# Patient Record
Sex: MALE | Race: OTHER | NOT HISPANIC OR LATINO
[De-identification: names, ages, dates, MRNs, and addresses within clinical notes are randomized per-mention and may not be internally consistent; named-entity substitution may affect disease eponyms.]

---

## 2023-05-18 PROBLEM — Z00.00 ENCOUNTER FOR PREVENTIVE HEALTH EXAMINATION: Status: ACTIVE | Noted: 2023-05-18

## 2023-05-26 ENCOUNTER — NON-APPOINTMENT (OUTPATIENT)
Age: 88
End: 2023-05-26

## 2023-05-26 ENCOUNTER — APPOINTMENT (OUTPATIENT)
Dept: INTERNAL MEDICINE | Facility: CLINIC | Age: 88
End: 2023-05-26
Payer: COMMERCIAL

## 2023-05-26 ENCOUNTER — APPOINTMENT (OUTPATIENT)
Dept: OTOLARYNGOLOGY | Facility: CLINIC | Age: 88
End: 2023-05-26
Payer: COMMERCIAL

## 2023-05-26 VITALS
HEIGHT: 69 IN | HEART RATE: 56 BPM | SYSTOLIC BLOOD PRESSURE: 133 MMHG | TEMPERATURE: 98.9 F | DIASTOLIC BLOOD PRESSURE: 84 MMHG | BODY MASS INDEX: 19.99 KG/M2 | OXYGEN SATURATION: 96 % | WEIGHT: 135 LBS

## 2023-05-26 VITALS
TEMPERATURE: 98.2 F | HEART RATE: 70 BPM | SYSTOLIC BLOOD PRESSURE: 153 MMHG | HEIGHT: 69 IN | WEIGHT: 134 LBS | DIASTOLIC BLOOD PRESSURE: 68 MMHG | BODY MASS INDEX: 19.85 KG/M2 | OXYGEN SATURATION: 96 % | RESPIRATION RATE: 14 BRPM

## 2023-05-26 DIAGNOSIS — J44.9 CHRONIC OBSTRUCTIVE PULMONARY DISEASE, UNSPECIFIED: ICD-10-CM

## 2023-05-26 DIAGNOSIS — R13.10 DYSPHAGIA, UNSPECIFIED: ICD-10-CM

## 2023-05-26 DIAGNOSIS — Z78.9 OTHER SPECIFIED HEALTH STATUS: ICD-10-CM

## 2023-05-26 DIAGNOSIS — I10 ESSENTIAL (PRIMARY) HYPERTENSION: ICD-10-CM

## 2023-05-26 DIAGNOSIS — H40.9 UNSPECIFIED GLAUCOMA: ICD-10-CM

## 2023-05-26 DIAGNOSIS — M19.90 UNSPECIFIED OSTEOARTHRITIS, UNSPECIFIED SITE: ICD-10-CM

## 2023-05-26 DIAGNOSIS — J38.7 OTHER DISEASES OF LARYNX: ICD-10-CM

## 2023-05-26 DIAGNOSIS — Z01.812 ENCOUNTER FOR PREPROCEDURAL LABORATORY EXAMINATION: ICD-10-CM

## 2023-05-26 DIAGNOSIS — R49.0 DYSPHONIA: ICD-10-CM

## 2023-05-26 DIAGNOSIS — Z01.818 ENCOUNTER FOR OTHER PREPROCEDURAL EXAMINATION: ICD-10-CM

## 2023-05-26 DIAGNOSIS — H91.90 UNSPECIFIED HEARING LOSS, UNSPECIFIED EAR: ICD-10-CM

## 2023-05-26 PROCEDURE — 31579 LARYNGOSCOPY TELESCOPIC: CPT

## 2023-05-26 PROCEDURE — 93000 ELECTROCARDIOGRAM COMPLETE: CPT

## 2023-05-26 PROCEDURE — 99204 OFFICE O/P NEW MOD 45 MIN: CPT | Mod: 25

## 2023-05-26 PROCEDURE — 99205 OFFICE O/P NEW HI 60 MIN: CPT | Mod: 25

## 2023-05-26 PROCEDURE — 70371 SPEECH EVALUATION COMPLEX: CPT | Mod: 59

## 2023-05-26 PROCEDURE — 36415 COLL VENOUS BLD VENIPUNCTURE: CPT

## 2023-05-26 RX ORDER — LATANOPROST/PF 0.005 %
0.01 DROPS OPHTHALMIC (EYE)
Qty: 1 | Refills: 3 | Status: ACTIVE | COMMUNITY
Start: 2023-05-26

## 2023-05-26 RX ORDER — LISINOPRIL 10 MG/1
10 TABLET ORAL DAILY
Qty: 90 | Refills: 3 | Status: ACTIVE | COMMUNITY

## 2023-05-26 RX ORDER — ALBUTEROL SULFATE 90 UG/1
108 (90 BASE) AEROSOL, METERED RESPIRATORY (INHALATION)
Qty: 1 | Refills: 2 | Status: ACTIVE | COMMUNITY
Start: 2023-05-26

## 2023-05-26 RX ORDER — MELOXICAM 15 MG/1
15 TABLET ORAL
Qty: 14 | Refills: 0 | Status: ACTIVE | COMMUNITY

## 2023-05-26 RX ORDER — FLUTICASONE PROPIONATE 110 UG/1
110 AEROSOL, METERED RESPIRATORY (INHALATION) TWICE DAILY
Qty: 3 | Refills: 0 | Status: ACTIVE | COMMUNITY
Start: 2023-05-26

## 2023-05-26 NOTE — HISTORY OF PRESENT ILLNESS
[de-identified] : 5/26/23\par 88-year-old gentleman who presents with concern for dysphagia and throat discomfort.  He notes that he has throat issues x 4-5 months.  He notes that he had difficulty with swallowing.  Food is passing without issue.  Sometimes he needs to have liquids to help pass food as well.  +coughing.  Significant voice changes, now raspy and froggy in nature.  No breathing issues.  No fevers, night sweats or weight loss.  No hx of tobacco use.  2 glass of wine daily.\par \par Known hx of hearing loss.  No pain in the ears.  +episode of dizziness/vertigo recently.\par  physician recently    Dr. Hayes Garg is treating. Shortness of breath:  Unclear if shortness of breath is related to Atrial fib, more likely due to deconditioning and weight. Better post sleep apnea   8/2017 [facility] showed normal perfusion. Obesity  Body mass index is 47.61 kg/m². Encouraged weight loss  Discussed the mediterranean diet    Plan:  Encouraged weight loss  Compliance with sleep treatment reviewed  F/u 1 year as will continue rate control and elioquis. Meghan Cuadra. Jas RODRIGUEZ Scribe attestation: This note was scribed in the presence of Saray Fong MD by Miki Moctezuma RN  The scribes documentation has been prepared under my direction and personally reviewed by me in its entirety. I confirm that the note above accurately reflects all work, treatment, procedures, and medical decision making performed by me.

## 2023-05-26 NOTE — ASSESSMENT
[FreeTextEntry1] : 88-year-old gentleman with several month history of dysphonia and dysphagia.  CT imaging consistent with right-sided laryngeal mass.  Physical exam today also consistent with a right-sided laryngeal mass with potential involvement of the piriform sinus and possibly a right vocal fold paralysis.  Airway currently appears to be patent.\par \par I had a lengthy discussion with the patient at his daughter.  This is consistent with a laryngeal cancer.  We discussed neck steps which would include proceeding to the operating room for suspension MicroDirect laryngoscopy with biopsy, likely tracheostomy and PEG tube placement.  Risks benefits and alternatives of surgery were discussed including but not limited to pain, bleeding, infection, risk of anesthesia, risk of intubation and airway management.  \par \par We also discussed the role of pet/ct imaging and the role of our multidisciplinary tumor board.  We also briefly discussed potential treatment options depending on biopsy including but not limited to surgical intervention, chemotherapy and radiation.  \par \par –Plan for OR for direct laryngoscopy with biopsy, and PEG tube placement, Possible tracheotomy\par –Preoperative planning\par –We will coordinate with our international team to help facilitate care.\par \par \par

## 2023-05-26 NOTE — PHYSICAL EXAM
[FreeTextEntry1] :  hoarse and wet sounding voice [Midline] : trachea located in midline position [Normal] : no rashes

## 2023-05-26 NOTE — PROCEDURE
[de-identified] : -\par Procedure Note\par \par Pre-operative Diagnosis: laryngeal cancer\par Post-operative Diagnosis:  laryngeal cancer,  pooling of secretions in the piriform sinus bilaterally.\par Anesthesia: Topical - 1 % Lidocaine/Phenylephrine\par Procedure:  Flexible Laryngoscopy with Stroboscopy - CPT 64451\par  \par Procedure Details:  \par The patient was placed in the sitting position.  After decongestant and anesthesia were applied the laryngoscope was passed.  The nasal cavities, nasopharynx, oropharynx, hypopharynx, and larynx were all examined.  Vocal folds were examined during respiration and phonation.  The following findings were noted:\par \par Findings:  \par Nose: Septum is midline, turbinates are normal, nasal airways patent, mucosa normal\par Nasopharynx: Adenoids normal, no masses, eustachian tube normal\par Oropharynx: Pharyngeal walls symmetric and without lesion. Tonsils/fossae symmetric\par Hypopharynx: Hypopharynx and pyriform sinuses without lesion. No masses or asymmetry.  No pooling of secretions.\par Larynx:   evidence of a necrotic tumor emanating from the right aryepiglottic fold.  Left aryepiglottic fold and arytenoid normal.  Left vocal fold normal.  + Pooling of secretions in the piriform sinus bilaterally\par \par Strobe Exam Ratings\par 		\par TVF Appearance: glottis appears to be normal, though right side hard to see due to bulky lesion in the supraglottis\par TVF Mobility: normal movement on the left, questionable movement on the right\par Edema/hypertrophy: +\par Mucus on TVF: minimal\par Glottic Closure: adequate\par Mucosal Wave: reduced\par Amplitude of Vibration: reduced\par Phase: symmetric\par Supraglottic Hyperfunction: unknown\par Other Findings: none\par \par Condition: Stable.  Patient tolerated procedure well.\par \par Complications: None\par \par --------------------------------------------------------------------\par \par Procedure: Pharyngeal and speech evaluation, by cine or video - CPT 23504	\par Voice assessment was recorded via video recording on this date.\par \par Clarity:  Hoarse, wet, gravelly\par Range: reduced\par Pitch Control: reduced\par Projection: normal\par Tremor: none\par Cough: none\par \par Condition: Stable.  Patient tolerated procedure well.\par Complications: None\par

## 2023-05-30 ENCOUNTER — TRANSCRIPTION ENCOUNTER (OUTPATIENT)
Age: 88
End: 2023-05-30

## 2023-05-30 VITALS
OXYGEN SATURATION: 96 % | SYSTOLIC BLOOD PRESSURE: 164 MMHG | HEIGHT: 69 IN | WEIGHT: 133.16 LBS | TEMPERATURE: 98 F | RESPIRATION RATE: 16 BRPM | HEART RATE: 70 BPM | DIASTOLIC BLOOD PRESSURE: 91 MMHG

## 2023-05-30 LAB
ALBUMIN SERPL ELPH-MCNC: 4.4 G/DL
ALP BLD-CCNC: 86 U/L
ALT SERPL-CCNC: 13 U/L
ANION GAP SERPL CALC-SCNC: 15 MMOL/L
APPEARANCE: CLEAR
APTT BLD: 34.8 SEC
AST SERPL-CCNC: 18 U/L
BILIRUB SERPL-MCNC: 0.4 MG/DL
BILIRUBIN URINE: NEGATIVE
BLOOD URINE: NEGATIVE
BUN SERPL-MCNC: 23 MG/DL
CALCIUM SERPL-MCNC: 9.8 MG/DL
CHLORIDE SERPL-SCNC: 102 MMOL/L
CO2 SERPL-SCNC: 24 MMOL/L
COLOR: YELLOW
CREAT SERPL-MCNC: 1.16 MG/DL
EGFR: 61 ML/MIN/1.73M2
GLUCOSE QUALITATIVE U: NEGATIVE MG/DL
GLUCOSE SERPL-MCNC: 102 MG/DL
INR PPP: 1.05 RATIO
KETONES URINE: 15 MG/DL
LEUKOCYTE ESTERASE URINE: NEGATIVE
NITRITE URINE: NEGATIVE
PH URINE: 5.5
POTASSIUM SERPL-SCNC: 4.5 MMOL/L
PROT SERPL-MCNC: 6.6 G/DL
PROTEIN URINE: NEGATIVE MG/DL
PT BLD: 12.4 SEC
SODIUM SERPL-SCNC: 141 MMOL/L
SPECIFIC GRAVITY URINE: 1.02
UROBILINOGEN URINE: 0.2 MG/DL

## 2023-05-30 NOTE — HISTORY OF PRESENT ILLNESS
[No Pertinent Cardiac History] : no history of aortic stenosis, atrial fibrillation, coronary artery disease, recent myocardial infarction, or implantable device/pacemaker [(Patient denies any chest pain, claudication, dyspnea on exertion, orthopnea, palpitations or syncope)] : Patient denies any chest pain, claudication, dyspnea on exertion, orthopnea, palpitations or syncope [Moderate (4-6 METs)] : Moderate (4-6 METs) [FreeTextEntry1] : Direct Laryngoscopy w/ Biopsy, Traceostomy, and PEG placement [FreeTextEntry2] : 5/31/2023 [FreeTextEntry3] : Dr. Crisostomo [FreeTextEntry4] : h/o dysphagia/throat pain a/w change in voice found to have laryngeal mass on CT scan. Scheduled for biopsy and possible PEG placement.

## 2023-05-30 NOTE — ASU PATIENT PROFILE, ADULT - NSICDXPASTMEDICALHX_GEN_ALL_CORE_FT
PAST MEDICAL HISTORY:  Deep vein thrombosis (DVT) left knee, 25 yrs ago    Dysphagia     Glaucoma     [place] (hard of hearing)     HTN (hypertension)

## 2023-05-30 NOTE — ASU PREOP CHECKLIST - WAS PATIENT ON BETA BLOCKER?
7/30/21: NEW FLOATER IN POSTERIOR SEGMENT. NO EVIDENCE OF TEAR/HOLE. ? REMNANT VITREOUS CYST? AFTER PPV. RECOMMEND EVAL BY DR MCRAE TO DETERMINE IF LASER COULD BE AMENABLE TO TREAT IT, OTHERWISE CONSIDER PPV. No

## 2023-05-31 ENCOUNTER — RESULT REVIEW (OUTPATIENT)
Age: 88
End: 2023-05-31

## 2023-05-31 ENCOUNTER — APPOINTMENT (OUTPATIENT)
Dept: OTOLARYNGOLOGY | Facility: AMBULATORY SURGERY CENTER | Age: 88
End: 2023-05-31

## 2023-05-31 ENCOUNTER — INPATIENT (INPATIENT)
Facility: HOSPITAL | Age: 88
LOS: 0 days | Discharge: HOME | DRG: 155 | End: 2023-06-01
Attending: OTOLARYNGOLOGY | Admitting: OTOLARYNGOLOGY
Payer: COMMERCIAL

## 2023-05-31 ENCOUNTER — APPOINTMENT (OUTPATIENT)
Dept: SURGERY | Facility: HOSPITAL | Age: 88
End: 2023-05-31

## 2023-05-31 ENCOUNTER — TRANSCRIPTION ENCOUNTER (OUTPATIENT)
Age: 88
End: 2023-05-31

## 2023-05-31 DIAGNOSIS — Z96.652 PRESENCE OF LEFT ARTIFICIAL KNEE JOINT: Chronic | ICD-10-CM

## 2023-05-31 DIAGNOSIS — C32.9 MALIGNANT NEOPLASM OF LARYNX, UNSPECIFIED: ICD-10-CM

## 2023-05-31 PROBLEM — H91.90 UNSPECIFIED HEARING LOSS, UNSPECIFIED EAR: Chronic | Status: ACTIVE | Noted: 2023-05-30

## 2023-05-31 PROBLEM — H40.9 UNSPECIFIED GLAUCOMA: Chronic | Status: ACTIVE | Noted: 2023-05-30

## 2023-05-31 PROBLEM — R13.10 DYSPHAGIA, UNSPECIFIED: Chronic | Status: ACTIVE | Noted: 2023-05-30

## 2023-05-31 PROCEDURE — 88331 PATH CONSLTJ SURG 1 BLK 1SPC: CPT | Mod: 26

## 2023-05-31 PROCEDURE — 88305 TISSUE EXAM BY PATHOLOGIST: CPT | Mod: 26

## 2023-05-31 PROCEDURE — 88342 IMHCHEM/IMCYTCHM 1ST ANTB: CPT | Mod: 26

## 2023-05-31 PROCEDURE — 43246 EGD PLACE GASTROSTOMY TUBE: CPT

## 2023-05-31 PROCEDURE — 31536 LARYNGOSCOPY W/BX & OP SCOPE: CPT

## 2023-05-31 PROCEDURE — 43246 EGD PLACE GASTROSTOMY TUBE: CPT | Mod: AS

## 2023-05-31 PROCEDURE — 88341 IMHCHEM/IMCYTCHM EA ADD ANTB: CPT | Mod: 26

## 2023-05-31 PROCEDURE — 88342 IMHCHEM/IMCYTCHM 1ST ANTB: CPT | Mod: 26,59

## 2023-05-31 DEVICE — SURGICEL 4 X 8": Type: IMPLANTABLE DEVICE | Status: FUNCTIONAL

## 2023-05-31 DEVICE — KIT ENDO SAFTEY PEG PULL STD 20 FR ENDOVIVE: Type: IMPLANTABLE DEVICE | Status: FUNCTIONAL

## 2023-05-31 RX ORDER — ACETAMINOPHEN 500 MG
650 TABLET ORAL EVERY 6 HOURS
Refills: 0 | Status: DISCONTINUED | OUTPATIENT
Start: 2023-05-31 | End: 2023-06-01

## 2023-05-31 RX ORDER — MORPHINE SULFATE 50 MG/1
2 CAPSULE, EXTENDED RELEASE ORAL
Refills: 0 | Status: DISCONTINUED | OUTPATIENT
Start: 2023-05-31 | End: 2023-06-01

## 2023-05-31 RX ORDER — LISINOPRIL 2.5 MG/1
10 TABLET ORAL DAILY
Refills: 0 | Status: DISCONTINUED | OUTPATIENT
Start: 2023-05-31 | End: 2023-06-01

## 2023-05-31 RX ORDER — MELOXICAM 15 MG/1
1 TABLET ORAL
Refills: 0 | DISCHARGE

## 2023-05-31 RX ORDER — LATANOPROST 0.05 MG/ML
1 SOLUTION/ DROPS OPHTHALMIC; TOPICAL AT BEDTIME
Refills: 0 | Status: DISCONTINUED | OUTPATIENT
Start: 2023-05-31 | End: 2023-06-01

## 2023-05-31 RX ORDER — LISINOPRIL 2.5 MG/1
1 TABLET ORAL
Refills: 0 | DISCHARGE

## 2023-05-31 RX ORDER — LATANOPROST 0.05 MG/ML
1 SOLUTION/ DROPS OPHTHALMIC; TOPICAL
Refills: 0 | DISCHARGE

## 2023-05-31 RX ORDER — IBUPROFEN 200 MG
600 TABLET ORAL EVERY 6 HOURS
Refills: 0 | Status: DISCONTINUED | OUTPATIENT
Start: 2023-05-31 | End: 2023-05-31

## 2023-05-31 RX ORDER — IBUPROFEN 200 MG
600 TABLET ORAL EVERY 8 HOURS
Refills: 0 | Status: DISCONTINUED | OUTPATIENT
Start: 2023-05-31 | End: 2023-06-01

## 2023-05-31 RX ORDER — FLUTICASONE PROPIONATE 220 MCG
0 AEROSOL WITH ADAPTER (GRAM) INHALATION
Refills: 0 | DISCHARGE

## 2023-05-31 RX ORDER — LANOLIN ALCOHOL/MO/W.PET/CERES
3 CREAM (GRAM) TOPICAL AT BEDTIME
Refills: 0 | Status: DISCONTINUED | OUTPATIENT
Start: 2023-05-31 | End: 2023-06-01

## 2023-05-31 RX ORDER — SODIUM CHLORIDE 9 MG/ML
1000 INJECTION, SOLUTION INTRAVENOUS
Refills: 0 | Status: DISCONTINUED | OUTPATIENT
Start: 2023-05-31 | End: 2023-05-31

## 2023-05-31 RX ORDER — LISINOPRIL 2.5 MG/1
10 TABLET ORAL DAILY
Refills: 0 | Status: DISCONTINUED | OUTPATIENT
Start: 2023-05-31 | End: 2023-05-31

## 2023-05-31 RX ORDER — ALBUTEROL 90 UG/1
2 AEROSOL, METERED ORAL
Refills: 0 | DISCHARGE

## 2023-05-31 RX ADMIN — LATANOPROST 1 DROP(S): 0.05 SOLUTION/ DROPS OPHTHALMIC; TOPICAL at 22:43

## 2023-05-31 NOTE — PATIENT PROFILE ADULT - FALL HARM RISK - HARM RISK INTERVENTIONS

## 2023-05-31 NOTE — BRIEF OPERATIVE NOTE - NSICDXBRIEFPREOP_GEN_ALL_CORE_FT
PRE-OP DIAGNOSIS:  Dysphagia 31-May-2023 09:04:34  Alexandra Dubon   PRE-OP DIAGNOSIS:  Dysphagia 31-May-2023 09:04:34  Alexandra Dubon  Laryngeal mass 31-May-2023 09:32:57  Triston Cope

## 2023-05-31 NOTE — SWALLOW BEDSIDE ASSESSMENT ADULT - SWALLOW EVAL: DIAGNOSIS
Suspect a degree of pharyngeal dysphagia with likely primary impact to safety>efficiency. Clinical indicators of penetration/aspiration noted with thin liquids, reduced with right head turn/tuck and eliminated with mildly thick liquids. However, multiple swallows (3) noted across liquid consistencies>solids. No rahul clinical indicators of penetration/aspiration noted with purees and solids. Presentation consistent with newly found R supraglottic lesion s/p biopsy today. Given site of lesion and clinical presentation, recommend a modified barium swallow study to further assess swallowing physiology and obtain baseline function as part of treatment plan work up.

## 2023-05-31 NOTE — SWALLOW BEDSIDE ASSESSMENT ADULT - SWALLOW EVAL: RECOMMENDED FEEDING/EATING TECHNIQUES
alternate food with liquid/crush medication (when feasible)/maintain upright posture during/after eating for 30 mins/oral hygiene/position upright (90 degrees)

## 2023-05-31 NOTE — PROGRESS NOTE ADULT - ASSESSMENT
88M with right laryngeal mass s/p direct laryngoscopy with biopsy and percutaenous gastric tube placement on 5/31. Frozen specimen positive for carcinoma.  Physical exam today also consistent with a right-sided laryngeal mass with potential involvement of the piriform sinus and possibly a right vocal fold paralysis.     INCOMPLETE 88M with right laryngeal mass s/p direct laryngoscopy with biopsy and percutaenous gastric tube placement on 5/31. Frozen specimen positive for carcinoma.  Physical exam today also consistent with a right-sided laryngeal mass with potential involvement of the piriform sinus and possibly a right vocal fold paralysis.     OK to use tube for meds

## 2023-05-31 NOTE — BRIEF OPERATIVE NOTE - NSICDXBRIEFPOSTOP_GEN_ALL_CORE_FT
POST-OP DIAGNOSIS:  Dysphagia 31-May-2023 09:04:38  Alexandra Dubon   POST-OP DIAGNOSIS:  Dysphagia 31-May-2023 09:04:38  Alexandra Dubon  Laryngeal mass 31-May-2023 09:33:06  rTiston Cope

## 2023-05-31 NOTE — SWALLOW BEDSIDE ASSESSMENT ADULT - CONSISTENCIES ADMINISTERED
ice chips, thin liquids via cup x 4, mildly thick liquids (3oz), purees x 2, soft solids x 2, dry solids x 1

## 2023-05-31 NOTE — SWALLOW BEDSIDE ASSESSMENT ADULT - SLP GENERAL OBSERVATIONS
Pt was seen fully awake and alert, HOB fully elevated, on room air. A&Ox3, followed simple directives, and communicated wants/needs. Motor speech exam WNL (sequential and alternating DDK tasks and in conversation). Mild dysphonia noted characterized by rough and soft vocal quality. Family member at bedside. Pt was seen fully awake and alert, HOB fully elevated, on room air. A&Ox3, followed simple directives, and communicated wants/needs. Motor speech exam WNL (sequential and alternating DDK tasks and in conversation). Resonance deemed WNL. Mild dysphonia noted characterized by rough and soft vocal quality. Family member at bedside.

## 2023-05-31 NOTE — BRIEF OPERATIVE NOTE - OPERATION/FINDINGS
Right laryngeal mass involving R AE fold. Frozen specimen positive for carcinoma.
Patient prepped and draped in sterile fashion. Endoscope introduced through mouth past laryngeal mass into stomach. Pylorus and duodenum appear normal. 1:1 observed with good light visualization. Local injected just to the left of midline upper abdomen and incision made. Catheter and wire passed through stomach and esophagus out of mouth and PEG tube snared down to stomach and brought out of abdominal incision. Tube secured and dressing placed. Patient tolerated procedure well.

## 2023-05-31 NOTE — BRIEF OPERATIVE NOTE - NSICDXBRIEFPROCEDURE_GEN_ALL_CORE_FT
PROCEDURES:  Esophagoscopy with percutaneous placement of gastrostomy tube 31-May-2023 09:06:44  Alexandra Dubon   PROCEDURES:  Esophagoscopy with percutaneous placement of gastrostomy tube 31-May-2023 09:06:44  Alexandra Dubon  Direct laryngoscopy with biopsy 31-May-2023 09:32:50  Triston Cope

## 2023-05-31 NOTE — SWALLOW BEDSIDE ASSESSMENT ADULT - COMMENTS
Per discussion with Pt and family member at bedside, cough reported with and without oral intake for the last few months and reported swallowing discomfort and effort with solids, often requiring liquid wash. Denied choking. Pt reported 5-6lbs unintentional weight loss in the last month. He denied any recent hx of PNA.

## 2023-05-31 NOTE — SWALLOW BEDSIDE ASSESSMENT ADULT - ORAL PHASE
Adequate oral containment, functional mastication with solids, seemingly functional bolus manipulation, and functional oral clearance.

## 2023-05-31 NOTE — SWALLOW BEDSIDE ASSESSMENT ADULT - SLP PERTINENT HISTORY OF CURRENT PROBLEM
87 y/o M with PMHx of COPD and HTN, who was seen by Dr. Crisostomo on 4/22/23 due to concern for dysphagia, throat discomfort, and voice changes over the last 4-5 months. CT imaging consistent with right-sided laryngeal mass. Laryngoscopy completed revealed “evidence of a necrotic tumor emanating from the right aryepiglottic fold. Left aryepiglottic fold and arytenoid normal. Left vocal fold normal. + Pooling of secretions in the piriform sinus bilaterally.” Pt admitted to [facility] on 5/31/23 for planned OR, now s/p direct laryngoscopy with biopsy and PEG placement on 5/31/23 under the direction of Dr. Crisostomo. 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

## 2023-05-31 NOTE — H&P ADULT - HISTORY OF PRESENT ILLNESS
88-year-old gentleman presented to outaptient office with with concern for dysphagia and throat discomfort for 4-5 months found to have right sided laryngeal mass on flexible endoscopy. Sometimes he needs to have liquids to help pass food as well. Also endourses coughing with swallow sometimes. Significant voice changes, now raspy and froggy in nature. No breathing issues. No fevers, night sweats or weight loss. No hx of tobacco use. 2 glass of wine daily. Underwent direct laryngoscopy with biopsy and percuatneous gastric tube (PEG) placement by general surgery on 5/31. Procedure uncomplicated.    Active Problems    COPD (chronic obstructive pulmonary disease) (496) (J44.9)  Glaucoma (365.9) (H40.9)  Hard of hearing (389.9) (H91.90)  HTN (hypertension) (401.9) (I10)  Osteoarthritis (715.90) (M19.90)  Pre-op exam (V72.84) (Z01.818)  Pre-operative laboratory examination (V72.63) (Z01.812)    Surgical History    History of Cataract Surgery  History of Hernia Repair  History of Total Knee Replacement Left    Social History    Caffeine use (V49.89) (Z78.9)  Lives in [place]  Non-smoker (V49.89) (Z78.9)  Social alcohol use (V49.89) (Z78.9)    Current Meds  Fluticasone Propionate  MCG/ACT Inhalation Aerosol; INHALE 1 PUFF TWICE  DAILY  Latanoprost 0.005 % Ophthalmic Solution; INSTILL 1 DROP IN BOTH EYES AT BEDTIME  Lisinopril 10 MG Oral Tablet; TAKE 1 TABLET DAILY  Meloxicam 15 MG Oral Tablet; TAKE 1 TABLET DAILY AS NEEDED  Ventolin  (90 Base) MCG/ACT Inhalation Aerosol Solution; INHALE 1 TO 2 PUFFS  EVERY 4 TO 6 HOURS AS NEEDED    Allergies    Penicillins    Vitals  Vital Signs    Recorded: [id number] 10:20AM   Systolic 133, LUE, Sitting   Diastolic 84, LUE, Sitting   Height 5 ft 9 in   Weight 135 lb    BMI Calculated 19.94 kg/m2   BSA Calculated 1.75   Height Comment Stated   Weight Comment Stated   Temperature 98.9 F, Temporal   Heart Rate 56, R Radial   O2 Saturation 96     Physical Exam    Constitutional: normal appearance, well groomed, well nourished, and in no acute distress . hoarse and wet sounding voice.     Neck: no mass and no adenopathy . parotid gland, submandibular gland and thyroid glands are normal. temporomandibular joint is normal.     Ear: external ears are normal bilaterally and tympanic membranes are normal in both ears.     Nasal:  external appearance is normal, inferior turbinates and middle turbinates are normal and no abnormal secretions.     Oral Cavity: tongue is normal, teeth are normal, gums are normal, palatine tonsils are normal, lingual tonsils are normal, mucosa is normal and trachea located in midline position.     Head/Face: no masses and lesions seen, face is symmetric . salivary glands are normal.     Eyes: ocular motility and gaze are normal, extraocular movements are normal and no nystagmus.     Respiratory: assessment of respiratory effort is normal.     Cardiovascular: cardiovascular peripheral examination is normal.     Lymphatic: palpation of lymph nodes is normal and no neck adenopathy.     Neurological: cranial nerves 2-12 intact and orientation to person, place, and time: normal.     Skin: no rashes.      A/P:   88M with right laryngeal mass s/p direct laryngoscopy with biopsy and percutaenous gastric tube placement on 5/31. Frozen specimen positive for carcinoma.  Physical exam today also consistent with a right-sided laryngeal mass with potential involvement of the piriform sinus and possibly a right vocal fold paralysis.     - Admit to ENT  - Appreciate gen surge recs regarding G tube  - Okay for meds via Gtube today  - Home meds  - Pain control PRN  - SLP swallow consultation  - Nutrition consultation

## 2023-05-31 NOTE — SWALLOW BEDSIDE ASSESSMENT ADULT - PHARYNGEAL PHASE
Suspect delayed swallow and reduced hyolaryngeal complex movement noted via palpation. Clinical indicators of penetration/aspiration noted with thin liquids (subtle throat clearing), reduced with a right head turn/tuck and eliminated with mildly thick liquids. Multiple swallows (3+) noted with liquids consistencies>solids.

## 2023-06-01 ENCOUNTER — TRANSCRIPTION ENCOUNTER (OUTPATIENT)
Age: 88
End: 2023-06-01

## 2023-06-01 VITALS — WEIGHT: 133.16 LBS

## 2023-06-01 PROCEDURE — 74230 X-RAY XM SWLNG FUNCJ C+: CPT

## 2023-06-01 PROCEDURE — L8699: CPT

## 2023-06-01 PROCEDURE — C1889: CPT

## 2023-06-01 PROCEDURE — 92610 EVALUATE SWALLOWING FUNCTION: CPT

## 2023-06-01 PROCEDURE — 88331 PATH CONSLTJ SURG 1 BLK 1SPC: CPT

## 2023-06-01 PROCEDURE — 88305 TISSUE EXAM BY PATHOLOGIST: CPT

## 2023-06-01 PROCEDURE — 74230 X-RAY XM SWLNG FUNCJ C+: CPT | Mod: 26

## 2023-06-01 PROCEDURE — 88341 IMHCHEM/IMCYTCHM EA ADD ANTB: CPT

## 2023-06-01 PROCEDURE — 86900 BLOOD TYPING SEROLOGIC ABO: CPT

## 2023-06-01 PROCEDURE — 86850 RBC ANTIBODY SCREEN: CPT

## 2023-06-01 PROCEDURE — 86901 BLOOD TYPING SEROLOGIC RH(D): CPT

## 2023-06-01 PROCEDURE — 92611 MOTION FLUOROSCOPY/SWALLOW: CPT

## 2023-06-01 RX ADMIN — LISINOPRIL 10 MILLIGRAM(S): 2.5 TABLET ORAL at 06:42

## 2023-06-01 NOTE — DISCHARGE NOTE PROVIDER - HOSPITAL COURSE
88-year-old gentleman presented to outaptient office with with concern for dysphagia and throat discomfort for 4-5 months found to have right sided laryngeal mass on flexible endoscopy. Sometimes he needs to have liquids to help pass food as well. Also endourses coughing with swallow sometimes. Significant voice changes, now raspy and froggy in nature. No breathing issues. No fevers, night sweats or weight loss. No hx of tobacco use. 2 glass of wine daily. Underwent direct laryngoscopy with biopsy and percutaneous gastric tube (PEG) placement by general surgery on 5/31. Procedure uncomplicated. SLP evaluated following procedure and he was started on minced and moist and then regular diet.

## 2023-06-01 NOTE — DIETITIAN INITIAL EVALUATION ADULT - ORAL NUTRITION SUPPLEMENTS
Consider Ensure Max Protein ONS TID (150kcal, 30g Protein) for nutrient optimization, additional calories and protein

## 2023-06-01 NOTE — DIETITIAN INITIAL EVALUATION ADULT - ADD RECOMMEND
1. Continue with current diet order  Consider Ensure Max Protein ONS TID (150kcal, 30g Protein) for nutrient optimization, additional calories and protein *per pt preference*  **for optimal nutrition optimization, recommend 50% of pt's nutrient needs to be met via tube feeding; if bolus feedings are amenable by pt and medical team, recommend:  Jevity 1.5 formula, 3 cans (237mL per can) bolus, providing 711mL TV, 1065kcal, 45.3g protein, 540mL free water; this provides 17.6kcal/kg, .75g/kg protein, based on ABW of 60.4kg; this is to meet >50% of pt's nutrient needs, remaining needs to be met orally/via PO.  2. Encourage pt to meet nutritional needs as able  3. Monitor PO intakes, trend weights (weekly), monitor skin integrity, monitor labs (electrolytes, CMP), monitor GI fxn   4. Encourage adherence to diet education (reinforce as able)   5. MVI supplementation  6. Pain and bowel regimen per team   7. Will continue to assess/honor preferences as able   8. Align nutrition interventions with GOC at all times  1. Continue with current diet order  Consider Ensure Max Protein ONS TID (150kcal, 30g Protein) for nutrient optimization, additional calories and protein *per pt preference*  **for optimal nutrition optimization, recommend 50% of pt's nutrient needs to be met via tube feeding, remainder of nutrient needs met via PO, as tolerated by pt; if bolus feedings are amenable by pt and medical team, recommend:  Jevity 1.5 formula, 3 cans (237mL per can) bolus, providing 711mL TV, 1065kcal, 45.3g protein, 540mL free water; this provides 17.6kcal/kg, .75g/kg protein, based on ABW of 60.4kg; this is to meet >50% of pt's nutrient needs, remaining needs to be met orally/via PO.  2. Encourage pt to meet nutritional needs as able  3. Monitor PO intakes, trend weights (weekly), monitor skin integrity, monitor labs (electrolytes, CMP), monitor GI fxn   4. Encourage adherence to diet education (reinforce as able)   5. MVI supplementation  6. Pain and bowel regimen per team   7. Will continue to assess/honor preferences as able   8. Align nutrition interventions with GOC at all times

## 2023-06-01 NOTE — SWALLOW VFSS/MBS ASSESSMENT ADULT - SLP PERTINENT HISTORY OF CURRENT PROBLEM
*** PMHx of COPD and HTN, who was seen by Dr. Crisostomo on 4/22/23 due to concern for dysphagia, throat discomfort, and voice changes over the last 4-5 months. CT imaging consistent with right-sided laryngeal mass. Laryngoscopy completed revealed “evidence of a necrotic tumor emanating from the right aryepiglottic fold. Left aryepiglottic fold and arytenoid normal. Left vocal fold normal. + Pooling of secretions in the piriform sinus bilaterally.” Pt admitted to [facility] on 5/31/23 for planned OR, now s/p direct laryngoscopy with biopsy and PEG placement on 5/31/23 under the direction of Dr. Crisostomo. 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

## 2023-06-01 NOTE — DISCHARGE NOTE PROVIDER - NSDCCPTREATMENT_GEN_ALL_CORE_FT
PRINCIPAL PROCEDURE  Procedure: Direct laryngoscopy with biopsy  Findings and Treatment:       SECONDARY PROCEDURE  Procedure: Esophagoscopy with percutaneous placement of gastrostomy tube  Findings and Treatment:

## 2023-06-01 NOTE — DIETITIAN NUTRITION RISK NOTIFICATION - ADDITIONAL COMMENTS/DIETITIAN RECOMMENDATIONS
1. Continue with current diet order  Consider Ensure Max Protein ONS TID (150kcal, 30g Protein) for nutrient optimization, additional calories and protein *per pt preference*  **for optimal nutrition optimization, recommend 50% of pt's nutrient needs to be met via tube feeding, remainder of nutrient needs met via PO, as tolerated by pt; if bolus feedings are amenable by pt and medical team, recommend:  Jevity 1.5 formula, 3 cans (237mL per can) bolus, providing 711mL TV, 1065kcal, 45.3g protein, 540mL free water; this provides 17.6kcal/kg, .75g/kg protein, based on ABW of 60.4kg; this is to meet >50% of pt's nutrient needs, remaining needs to be met orally/via PO.  2. Encourage pt to meet nutritional needs as able  3. Monitor PO intakes, trend weights (weekly), monitor skin integrity, monitor labs (electrolytes, CMP), monitor GI fxn   4. Encourage adherence to diet education (reinforce as able)   5. MVI supplementation  6. Pain and bowel regimen per team   7. Will continue to assess/honor preferences as able   8. Align nutrition interventions with GOC at all times

## 2023-06-01 NOTE — SWALLOW VFSS/MBS ASSESSMENT ADULT - PHARYNGEAL PHASE COMMENTS
Initiation of the pharyngeal swallow imaged at the level of the pyriform sinuses of thin liquids. Delayed and incomplete supraglottic closure resulted in chronic penetration during the swallow with thin and mildly thick liquids and subsequent silent aspiration with thin liquids and with spillage after Initiation of the pharyngeal swallow imaged at the level of the pyriform sinuses with liquid consistencies. Reduced hyolaryngeal complex with partial epiglottic inversion and delayed and incomplete supraglottic closure (narrow) resulted in chronic penetration during the swallow with thin and mildly thick liquids and subsequent aspiration of laryngeal vestibule residue (aspiration was primarily silent, infrequent delayed cough response noted), not eliminated with a right head turn/tuck. Aspiration was >trace, though not gross. Prompted cough/throat clear partially cleared penetrated/aspirated material. Reduced BOT retraction, pharyngeal stripping, and bolus propulsion resulted in reduced cricopharyngeal opening and mild diffuse residual with thin and thickened liquid consistencies, moderate vallecular residual with pudding, severe vallecular retention with solids (imaged inconsistently). Subsequent swallows partially cleared residual. However, trace-minimal aspiration imaged of residual that spilled into the laryngeal vestibule after the swallow as PO intake continued. Initiation of the pharyngeal swallow imaged at the level of the pyriform sinuses with liquid consistencies. Reduced hyolaryngeal complex with partial epiglottic inversion and delayed and incomplete supraglottic closure (narrow) resulted in chronic penetration during the swallow with thin and mildly thick liquids and subsequent aspiration of laryngeal vestibule residue (aspiration was primarily silent, infrequent delayed cough response noted), not eliminated with a right head turn/tuck. Aspiration was >trace, though not gross. Prompted cough/throat clear partially cleared penetrated/aspirated material. Reduced BOT retraction, pharyngeal stripping, and bolus propulsion resulted in reduced cricopharyngeal opening and mild diffuse residual with thin and thickened liquid consistencies, moderate vallecular residual with pudding, and severe vallecular retention with solids (imaged inconsistently). Subsequent swallows partially cleared residual. However, trace-minimal aspiration imaged of residual that spilled into the laryngeal vestibule after the swallow as PO intake continued. Initiation of the pharyngeal swallow imaged at the level of the pyriform sinuses with liquid consistencies. Reduced hyolaryngeal complex with partial epiglottic inversion and delayed and incomplete supraglottic closure (narrow) resulted in chronic penetration during the swallow with thin and mildly thick liquids and subsequent aspiration of laryngeal vestibule residue (aspiration was primarily silent, infrequent delayed cough response noted), not eliminated with a right head turn/tuck. Aspiration was >trace, though not gross. Prompted cough/throat clear partially cleared penetrated/aspirated material. Reduced BOT retraction (wide column, most notable with solids), pharyngeal stripping, and bolus propulsion resulted in reduced cricopharyngeal opening and mild diffuse residual with thin and thickened liquid consistencies, moderate vallecular residual with pudding, and severe vallecular retention with solids (imaged inconsistently). Subsequent swallows partially cleared residual. However, trace-minimal aspiration imaged of residual that spilled into the laryngeal vestibule after the swallow as PO intake continued.    A/P b/l pharyngeal contraction imaged, R reduced>left

## 2023-06-01 NOTE — DISCHARGE NOTE PROVIDER - CARE PROVIDER_API CALL
Masha Ambrosio  Surgery  [address], [address]  [place] [address]  Phone: [phone]  Fax: [phone]  Follow Up Time:     Damion Crisostomo  Otolaryngology  [address]  [place] [address]  Phone: [phone]  Fax: [phone]  Follow Up Time:

## 2023-06-01 NOTE — DIETITIAN INITIAL EVALUATION ADULT - NSICDXPASTMEDICALHX_GEN_ALL_CORE_FT
PAST MEDICAL HISTORY:  Deep vein thrombosis (DVT) left knee, 25 yrs ago    Dysphagia     Glaucoma     [place] ([place])     HTN (hypertension)

## 2023-06-01 NOTE — DIETITIAN INITIAL EVALUATION ADULT - PERTINENT MEDS FT
MEDICATIONS  (STANDING):  latanoprost 0.005% Ophthalmic Solution 1 Drop(s) Both EYES at bedtime  lisinopril 10 milliGRAM(s) Oral daily  morphine  - Injectable 2 milliGRAM(s) IV Push every 15 minutes    MEDICATIONS  (PRN):  acetaminophen   Oral Liquid .. 650 milliGRAM(s) Enteral Tube every 6 hours PRN Mild Pain (1 - 3), Moderate Pain (4 - 6)  ibuprofen  Suspension. 600 milliGRAM(s) Enteral Tube every 8 hours PRN Severe Pain (7 - 10)  melatonin 3 milliGRAM(s) Oral at bedtime PRN Insomnia

## 2023-06-01 NOTE — DIETITIAN INITIAL EVALUATION ADULT - PERSON TAUGHT/METHOD
Pt amenable to education; RD provided education in regards to the importance of adequate macro and micronutrients, as well as hydration to support ADLs, maintain energy levels and overall functional/nutritional status. General healthful education provided. Nutrient dense foods promoted. RD provided education on importance of optimal PO intake, discussed increased nutrient needs secondary to upcoming chemotherapy/radiation. Encouraged small, frequent, nutrient dense meals. Discussed protein sources. Pt was receptive and verbalized understanding. No requests for ONS/nourishments at this time r/t pt stated he is eating well currently. Pt open to ONS/nourishments when he returns home./verbal instruction/patient instructed/son instructed

## 2023-06-01 NOTE — SWALLOW VFSS/MBS ASSESSMENT ADULT - ADDITIONAL RECOMMENDATIONS
LT: Pt will safely tolerate the least restrictive diet.  ST:   1) Pt will complete dysphagia exercises to target efficiency (jessica exercise and effortful swallow) of the swallow with min cues.  Encourage use of incentive spirometer to optimize pulmonary health and potentially improve cough strength LT: Pt will safely tolerate the least restrictive diet.  ST:   1) Pt will complete dysphagia exercises to target efficiency (jessica exercise and effortful swallow) of the swallow with min cues.  Encourage use of incentive spirometer to optimize pulmonary health and potentially improve cough strength    Education provided re: deficits, diet recommendations, aspiration precautions, and plan of care. Potential signs/symptoms of aspiration reviewed and potential pulmonary complications. Emphasis placed on continuation of oral intake as tolerated for swallow preservation. Should treatment plan include CRT, emphasis placed on seeking a speech pathologist in [facility] to allow for continued follow up throughout treatment to maximize functional outcomes given acute and long term negative effects of RT on swallow function. This service to follow up to provided handout for dysphagia exercises prior to d/c.

## 2023-06-01 NOTE — SWALLOW VFSS/MBS ASSESSMENT ADULT - CONSISTENCIES ADMINISTERED
5mL thin liquids x 1, 10mL thin liquids x 1, open cup thin liquids via cup x 2; mildly thick liquids x 5; pudding x2; solids x 2

## 2023-06-01 NOTE — SWALLOW VFSS/MBS ASSESSMENT ADULT - RECOMMENDED FEEDING/EATING TECHNIQUES
throat clear-reswallow with thin liquids/alternate food with liquid/maintain upright posture during/after eating for 30 mins/oral hygiene/position upright (90 degrees)/small sips/bites

## 2023-06-01 NOTE — DISCHARGE NOTE PROVIDER - NSDCFUADDINST_GEN_ALL_CORE_FT
Pain control:  Please continue to take tylenol as needed for pain. DO NOT exceed 4000 mg per day.     Medications:  Please continue to take all of your other medications as prescribed.     Diet: Please resume your regular diet.    Please avoid heavy lifting and strenuous activity for 2 weeks following your surgery.    Incision care:  Please monitor your incision sites for any sings of infection including redness, swelling, discharge, or increased pain. If you experience any of these please contact your doctor.  Please avoid swimming and bathing until your follow up appointment. You may shower starting 48 hours after surgery, allow soap and water to flow over the wound, do not scrub, pat dry when done.  Pain control:  Please continue to take tylenol as needed for pain. DO NOT exceed 4000 mg per day.     Medications:  Please continue to take all of your other medications as prescribed.   change gauze around g tube site as needed     Diet: Please resume your regular diet. Nutrition recs as listed below:   1. Continue with current diet order  Consider Ensure Max Protein ONS TID (150kcal, 30g Protein) for nutrient optimization, additional calories and protein *per pt preference*      **for optimal nutrition optimization, recommend 50% of pt's nutrient needs to be met via tube feeding; if bolus feedings are amenable by pt and medical team, recommend:  Jevity 1.5 formula, 3 cans (237mL per can) bolus, providing 711mL TV, 1065kcal, 45.3g protein, 540mL free water; this provides 17.6kcal/kg, .75g/kg protein, based on ABW of 60.4kg; this is to meet >50% of pt's nutrient needs, remaining needs to be met orally/via PO.  2. Encourage pt to meet nutritional needs as able  3. Monitor PO intakes, trend weights (weekly), monitor skin integrity, monitor labs (electrolytes, CMP), monitor GI fxn   4. Encourage adherence to diet education (reinforce as able)   5. MVI supplementation  6. Pain and bowel regimen per team   7. Will continue to assess/honor preferences as able   8. Align nutrition interventions with GOC at all times  patient instructed; son instructed; Pt amenable to education; RD provided education in regards to the importance of adequate macro and micronutrients, as well as hydration to support ADLs, maintain energy levels and overall functional/nutritional status. General healthful education provided. Nutrient dense foods promoted. RD provided education on importance of optimal PO intake, discussed increased nutrient needs secondary to upcoming chemotherapy/radiation. Encouraged small, frequent, nutrient dense meals. Discussed protein sources. Pt was receptive and verbalized understanding. No requests for ONS/nourishments at this time r/t pt stated he is eating well currently. Pt open to ONS/nourishments when he returns home.; verbal instruction    Please avoid heavy lifting and strenuous activity for 2 weeks following your surgery.    Incision care:  Please monitor your incision sites for any sings of infection including redness, swelling, discharge, or increased pain. If you experience any of these please contact your doctor.  Please avoid swimming and bathing until your follow up appointment. You may shower starting 48 hours after surgery, allow soap and water to flow over the wound, do not scrub, pat dry when done.

## 2023-06-01 NOTE — DISCHARGE NOTE PROVIDER - NSDCMRMEDTOKEN_GEN_ALL_CORE_FT
fluticasone 110 mcg/inh inhalation aerosol with adapter: inhaled prn  latanoprost 0.005% ophthalmic emulsion: 1 in each affected eye once a day  lisinopril 10 mg oral tablet: 1 orally once a day  meloxicam 15 mg oral tablet: 1 orally prn  Ventolin HFA 90 mcg/inh inhalation aerosol: 2 inhaled prn

## 2023-06-01 NOTE — DIETITIAN INITIAL EVALUATION ADULT - OTHER CALCULATIONS
Based on Standards of Care pt within % IBW thus actual body weight used for all calculations. Needs adjusted for advanced age, upcoming chemotherapy and radiation demands r/t cancer status and malnutrition.

## 2023-06-01 NOTE — SWALLOW VFSS/MBS ASSESSMENT ADULT - ORAL PHASE COMMENTS
Adequate oral containment, mildly prolonged mastication with solids, reduced bolus cohesion with thin liquids, and functional oral clearance. Adequate oral containment, mildly prolonged mastication with solids, reduced bolus cohesion with thin liquids with disorganized a/p transport, and minimal oral residue. Pt cleared oral residual with subsequent swallows.

## 2023-06-01 NOTE — SWALLOW VFSS/MBS ASSESSMENT ADULT - SLP GENERAL OBSERVATIONS
Pt was seen fully awake and alert, sitting fully upright in chair, on room air. Pt followed simple directives and communicated wants/needs.

## 2023-06-01 NOTE — SWALLOW VFSS/MBS ASSESSMENT ADULT - DIAGNOSTIC IMPRESSIONS
Mildly impaired oral phase. Severe pharyngeal dysphagia with impact to safety and efficiency of the swallow. Delayed and incomplete supraglottic closure resulted in chronic penetration during the swallow with thin and mildly thick liquids with subsequent primarily silent aspiration (>trace, not gross) of laryngeal vestibule residual, partially cleared most effectively with a prompted robust cough/throat clear. Reduced BOT retraction and bolus propulsion resulted in moderate and severe vallecular retention with pudding and solids, respectively and mildly diffuse with mildly thick>thin liquids. Spontaneous subsequent swallows and prompted effortful swallow partially cleared residual. Aspiration (though trace-min) imaged of residual after and during subsequent swallows as PO intake continued.     Presentation consistent with newly found R supraglottic lesion. Dysphagia unlikely to be new. No current concern for active pulmonary infection and Pt remains on room air. Improvement in swallow function dependent on treatment plan and response. Should treatment plan include CRT, anticipate decline in function associated with negative effects of RT. Pt would benefit from continued follow up by speech pathology to maximize functional outcomes. Mildly impaired oral phase. Severe pharyngeal dysphagia with impact to safety and efficiency of the swallow. Delayed and incomplete supraglottic closure resulted in chronic penetration during the swallow with thin and mildly thick liquids with subsequent primarily silent aspiration (>trace, not gross) of laryngeal vestibule residual, partially cleared most effectively with a prompted robust cough/throat clear. Reduced BOT retraction and bolus propulsion resulted in moderate and severe vallecular retention with pudding and solids, respectively and mildly diffuse with mildly thick>thin liquids. Spontaneous subsequent swallows and prompted effortful swallow partially cleared residual. Aspiration (though trace-min) imaged of residual after and during subsequent swallows as PO intake continued.     Presentation consistent with newly found R supraglottic lesion. Dysphagia unlikely to be new. No current concern for active pulmonary infection and Pt remains on room air. Improvement in swallow function dependent on treatment plan and response. Should treatment plan include CRT, anticipate decline in function associated with negative effects of RT. Pt would benefit from continued follow up by speech pathology to maximize functional outcomes.      DIGEST V2 Grade 3 (S3, E3)

## 2023-06-01 NOTE — DIETITIAN INITIAL EVALUATION ADULT - OTHER INFO
88-year-old gentleman presented to outaptient office with with concern for dysphagia and throat discomfort for 4-5 months found to have right sided laryngeal mass on flexible endoscopy. Sometimes he needs to have liquids to help pass food as well. Also endourses coughing with swallow sometimes. Significant voice changes, now raspy and froggy in nature. No breathing issues. No fevers, night sweats or weight loss. No hx of tobacco use. 2 glass of wine daily. Underwent direct laryngoscopy with biopsy and percuatneous gastric tube (PEG) placement by general surgery on 5/31. Procedure uncomplicated.    Pt seen in room for nutrition assessment. Pt reports fair appetite PTA and during hospital stay. As per diet recall PTA: pt stated he was eating "everything and anything" however he stated he was eating smaller portions, r/t his discomfort in his throat, likely meeting </=75% of needs; pt stated he was cooking for his wife, who has dementia, mostly pasta, vegetables, sausage, etc. Currently on regular diet, was advanced today per SLP recommendations, was previously on Minced and Moist textures with mildly thickened liquids, tolerating fairly, noted with ~50-75% PO intakes overall. No cultural, Adventism, or ethnic food preferences noted. NKFA. Pt and pt's son stated ~5# weight loss in the past 1-2 months (3.8%, not clinically significant but noteworthy), UBW ~140#, dosing wt: 133#, IBW: 160#, pt is 83% of IBW. Denies N/V/D/C, last BM on 5/31/23 in the morning per pt. No edema. Skin: midline abdominal surgical incision. John: 20. No issues chewing or swallowing noted. Denies pain. Labs reviewed: no current abnormal nutrition-related labs in EMR/pt chart; RD to continue to monitor trends. Nutritionally pertinent medications: no nutritionally pertinent medications/supplements at this time. Per nutrition focused physical exam, RD observed pt with moderate triceps subcutaneous fat loss and clavicle muscle wasting. Based on ASPEN guidelines, pt does meet criteria for moderate malnutrition at this time. Pt amenable to education; RD provided education in regards to the importance of adequate macro and micronutrients, as well as hydration to support ADLs, maintain energy levels and overall functional/nutritional status. General healthful education provided. Nutrient dense foods promoted. RD provided education on importance of optimal PO intake, discussed increased nutrient needs secondary to upcoming chemotherapy/radiation. Encouraged small, frequent, nutrient dense meals. Discussed protein sources. Pt was receptive and verbalized understanding. No requests for ONS/nourishments at this time r/t pt stated he is eating well currently. Pt open to ONS/nourishments when he returns home. No additional nutrition-related concerns. Will continue to follow per RD protocol. Additional nutrition recommendations below to follow.

## 2023-06-01 NOTE — PROGRESS NOTE ADULT - ASSESSMENT
Assessment and Plan:  KUSUM GROVE is a 87 y/o M with PMHx of COPD and HTN, who was seen by Dr. Crisostomo on 4/22/23 due to concern for dysphagia, throat discomfort, and voice changes over the last 4-5 months. CT imaging consistent with right-sided laryngeal mass. Laryngoscopy completed revealed “evidence of a necrotic tumor emanating from the right aryepiglottic fold. Left aryepiglottic fold and arytenoid normal. Left vocal fold normal. + Pooling of secretions in the piriform sinus bilaterally.” Pt admitted to [facility] on 5/31/23 for planned OR, now s/p direct laryngoscopy with biopsy and PEG placement on 5/31/23 under the direction of Dr. Crisostomo. 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      PLAN:  - Okay for meds via Gtube today  - Home meds  - Pain control PRN  - SLP swallow consultation- recommend MBS today   - Nutrition consultation    [facility] at [phone] with any questions/concerns.    Nica Salinas PA-C  06-01-23 @ 07:11

## 2023-06-01 NOTE — PROGRESS NOTE ADULT - SUBJECTIVE AND OBJECTIVE BOX
STATUS POST:  Esophagoscopy with percutaneous placement of gastrostomy tube    Direct laryngoscopy with biopsy        POST OPERATIVE DAY #: 0    SUBJECTIVE:   Patient seen and examined for postop check. No new complaints. No abd pain. -n-v-cp-sob.    Vital Signs Last 24 Hrs  T(C): 36.6 (31 May 2023 11:40), Max: 36.6 (30 May 2023 14:55)  T(F): 97.8 (31 May 2023 11:40), Max: 97.9 (30 May 2023 14:55)  HR: 77 (31 May 2023 11:40) (60 - 77)  BP: 155/89 (31 May 2023 11:40) (145/70 - 172/80)  BP(mean): 114 (31 May 2023 10:30) (100 - 115)  RR: 17 (31 May 2023 11:40) (13 - 17)  SpO2: 97% (31 May 2023 11:40) (96% - 100%)    Parameters below as of 31 May 2023 10:30  Patient On (Oxygen Delivery Method): nasal cannula  O2 Flow (L/min): 2      I&O's Summary    31 May 2023 07:01  -  31 May 2023 11:44  --------------------------------------------------------  IN: 1200 mL / OUT: 3 mL / NET: 1197 mL        Physical Exam:  General Appearance: Appears well, NAD  Pulmonary: Nonlabored breathing, no respiratory distress  Cardiovascular: NSR  Abdomen: Soft, nondistended, nontender. G tube in place. No discharge at insertion site. covered with abd binder.  Extremities: WWP, SCD's in place     LABS:              
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     EVENTS:   - No signficant postoeprative events    SUBJECTIVE:   - Mia abdominal pain  - Denies N/V  - No flatus/BM yet.    MEDICATIONS  (STANDING):  latanoprost 0.005% Ophthalmic Solution 1 Drop(s) Both EYES at bedtime  lisinopril 10 milliGRAM(s) Oral daily  morphine  - Injectable 2 milliGRAM(s) IV Push every 15 minutes    MEDICATIONS  (PRN):  acetaminophen   Oral Liquid .. 650 milliGRAM(s) Enteral Tube every 6 hours PRN Mild Pain (1 - 3), Moderate Pain (4 - 6)  ibuprofen  Suspension. 600 milliGRAM(s) Enteral Tube every 8 hours PRN Severe Pain (7 - 10)  melatonin 3 milliGRAM(s) Oral at bedtime PRN Insomnia      Vital Signs Last 24 Hrs  T(C): 36.4 (01 Jun 2023 05:04), Max: 37.2 (31 May 2023 20:57)  T(F): 97.5 (01 Jun 2023 05:04), Max: 98.9 (31 May 2023 20:57)  HR: 79 (01 Jun 2023 06:40) (60 - 80)  BP: 138/79 (01 Jun 2023 06:40) (125/78 - 172/80)  BP(mean): 114 (31 May 2023 10:30) (100 - 115)  RR: 17 (01 Jun 2023 05:04) (13 - 17)  SpO2: 95% (01 Jun 2023 05:04) (95% - 100%)    Parameters below as of 01 Jun 2023 05:04  Patient On (Oxygen Delivery Method): room air        Neurologic: AAOx4; moving all extremities equally.   CV: Normal rate, regular rhythm  Pulm: Breathing comfortably  Abd: Soft, non-distended; No TTP throughout.   PEG: no soiling of dressing  : No [place]  Skin: No rashes  Extremities: No edema.  Psychiatric: Interacting normally.     I&O's Detail    31 May 2023 07:01  -  01 Jun 2023 07:00  --------------------------------------------------------  IN:    Lactated Ringers: 1600 mL  Total IN: 1600 mL    OUT:    Blood Loss (mL): 3 mL    Voided (mL): 875 mL  Total OUT: 878 mL    Total NET: 722 mL          LABS:                RADIOLOGY & ADDITIONAL STUDIES:
OTOLARYNGOLOGY (ENT) PROGRESS NOTE    PATIENT: KUSUM GROVE  MRN: [id number]  : 35  [id number]  DATE OF SERVICE:  23  			          Subjective/ Interval:   : patient seen this morning, did well overnight, pain controlled, tolerating diet, plan for MBS this morning/ afternoon     ALLERGIES:  penicillin (Rash)      MEDICATIONS:  Antiinfectives:     IV fluids:    Hematologic/Anticoagulation:    Pain medications/Neuro:  acetaminophen   Oral Liquid .. 650 milliGRAM(s) Enteral Tube every 6 hours PRN  ibuprofen  Suspension. 600 milliGRAM(s) Enteral Tube every 8 hours PRN  melatonin 3 milliGRAM(s) Oral at bedtime PRN  morphine  - Injectable 2 milliGRAM(s) IV Push every 15 minutes    Endocrine Medications:     All other standing medications:   latanoprost 0.005% Ophthalmic Solution 1 Drop(s) Both EYES at bedtime  lisinopril 10 milliGRAM(s) Oral daily    All other PRN medications:    Vital Signs Last 24 Hrs  T(C): 36.4 (2023 05:04), Max: 37.2 (31 May 2023 20:57)  T(F): 97.5 (2023 05:04), Max: 98.9 (31 May 2023 20:57)  HR: 79 (2023 06:40) (60 - 80)  BP: 138/79 (2023 06:40) (125/78 - 172/80)  BP(mean): 114 (31 May 2023 10:30) (100 - 115)  RR: 17 (2023 05:04) (13 - 17)  SpO2: 95% (2023 05:04) (95% - 100%)    Parameters below as of 2023 05:04  Patient On (Oxygen Delivery Method): room air           @ 07:01  -   @ 07:00  --------------------------------------------------------  IN:    Lactated Ringers: 1600 mL  Total IN: 1600 mL    OUT:    Blood Loss (mL): 3 mL    Voided (mL): 875 mL  Total OUT: 878 mL    Total NET: 722 mL        PHYSICAL EXAM:    Constitutional: normal appearance and in no acute distress . hoarse and wet sounding voice.   Neck: no mass and no adenopathy . parotid gland, submandibular gland and thyroid glands are normal. temporomandibular joint is normal.   Ear: external ears are normal bilaterall  Nasal:  external appearance is normal, inferior turbinates and middle turbinates are normal and no abnormal secretions.   Oral Cavity: tongue is normal, teeth are normal, gums are normal, palatine tonsils are normal, lingual tonsils are normal, mucosa is normal and trachea located in midline position.   Head/Face: no masses and lesions seen, face is symmetric . salivary glands are normal.   Eyes: ocular motility and gaze are normal, extraocular movements are normal and no nystagmus.   Respiratory: assessment of respiratory effort is normal.   Cardiovascular: cardiovascular peripheral examination is normal.   Lymphatic: palpation of lymph nodes is normal and no neck adenopathy.   Neurological: cranial nerves 2-12 intact and orientation to person, place, and time: normal.

## 2023-06-01 NOTE — PROGRESS NOTE ADULT - ASSESSMENT
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     - Ok for trickle feeds at 20 CC/hr and advance q8h to goal rate while in house.   - Surgery 4C followng

## 2023-06-01 NOTE — DIETITIAN INITIAL EVALUATION ADULT - NUTRITIONGOAL OUTCOME1
Pt to consistently meet at least 75% of EEE via tolerated route that is consistent with GOC during hospital stay;

## 2023-06-01 NOTE — DISCHARGE NOTE PROVIDER - NSDCFUADDAPPT_GEN_ALL_CORE_FT
Please follow up with Dr. Ambrosio in 1-2 weeks following discharge.     Please follow up with Dr. Crisostomo as scheduled.

## 2023-06-01 NOTE — DISCHARGE NOTE NURSING/CASE MANAGEMENT/SOCIAL WORK - PATIENT PORTAL LINK FT
You can access the FollowMyHealth Patient Portal offered by [facility] by registering at the following website: [email or web address]. By joining [employer]’s FollowMyHealth portal, you will also be able to view your health information using other applications (apps) compatible with our system.

## 2023-06-01 NOTE — DISCHARGE NOTE PROVIDER - NSDCFUSCHEDAPPT_GEN_ALL_CORE_FT
Damion Crisostomo  [facility] PreAdmits  Scheduled Appointment: 06/02/2023    Asa Physician [facility]  [address]  Scheduled Appointment: 06/02/2023    Damion Crisostomo Physician [facility]  [address]  Scheduled Appointment: 06/12/2023

## 2023-06-02 ENCOUNTER — APPOINTMENT (OUTPATIENT)
Dept: NUCLEAR MEDICINE | Facility: HOSPITAL | Age: 88
End: 2023-06-02

## 2023-06-02 ENCOUNTER — OUTPATIENT (OUTPATIENT)
Dept: OUTPATIENT SERVICES | Facility: HOSPITAL | Age: 88
LOS: 1 days | End: 2023-06-02
Payer: COMMERCIAL

## 2023-06-02 DIAGNOSIS — Z96.652 PRESENCE OF LEFT ARTIFICIAL KNEE JOINT: Chronic | ICD-10-CM

## 2023-06-02 PROBLEM — I10 ESSENTIAL (PRIMARY) HYPERTENSION: Chronic | Status: ACTIVE | Noted: 2023-05-30

## 2023-06-02 LAB
GLUCOSE BLDC GLUCOMTR-MCNC: 106 MG/DL — HIGH (ref 70–99)
SURGICAL PATHOLOGY STUDY: SIGNIFICANT CHANGE UP

## 2023-06-02 PROCEDURE — 82962 GLUCOSE BLOOD TEST: CPT

## 2023-06-02 PROCEDURE — 78815 PET IMAGE W/CT SKULL-THIGH: CPT

## 2023-06-02 PROCEDURE — A9552: CPT

## 2023-06-02 PROCEDURE — 78815 PET IMAGE W/CT SKULL-THIGH: CPT | Mod: 26,PI

## 2023-06-06 DIAGNOSIS — J38.7 OTHER DISEASES OF LARYNX: ICD-10-CM

## 2023-06-06 DIAGNOSIS — J44.9 CHRONIC OBSTRUCTIVE PULMONARY DISEASE, UNSPECIFIED: ICD-10-CM

## 2023-06-06 DIAGNOSIS — I10 ESSENTIAL (PRIMARY) HYPERTENSION: ICD-10-CM

## 2023-06-06 DIAGNOSIS — Z98.49 CATARACT EXTRACTION STATUS, UNSPECIFIED EYE: ICD-10-CM

## 2023-06-06 DIAGNOSIS — Z88.0 ALLERGY STATUS TO PENICILLIN: ICD-10-CM

## 2023-06-06 DIAGNOSIS — C32.9 MALIGNANT NEOPLASM OF LARYNX, UNSPECIFIED: ICD-10-CM

## 2023-06-06 DIAGNOSIS — Z79.51 LONG TERM (CURRENT) USE OF INHALED STEROIDS: ICD-10-CM

## 2023-06-06 DIAGNOSIS — E44.0 MODERATE PROTEIN-CALORIE MALNUTRITION: ICD-10-CM

## 2023-06-12 ENCOUNTER — APPOINTMENT (OUTPATIENT)
Dept: OTOLARYNGOLOGY | Facility: CLINIC | Age: 88
End: 2023-06-12

## 2024-01-10 PROBLEM — I82.409 ACUTE EMBOLISM AND THROMBOSIS OF UNSPECIFIED DEEP VEINS OF UNSPECIFIED LOWER EXTREMITY: Chronic | Status: ACTIVE | Noted: 2023-05-30

## 2024-01-11 ENCOUNTER — INPATIENT (INPATIENT)
Facility: HOSPITAL | Age: 89
LOS: 20 days | Discharge: HOME HEALTH | DRG: 11 | End: 2024-02-01
Attending: OTOLARYNGOLOGY | Admitting: OTOLARYNGOLOGY
Payer: COMMERCIAL

## 2024-01-11 ENCOUNTER — TRANSCRIPTION ENCOUNTER (OUTPATIENT)
Age: 89
End: 2024-01-11

## 2024-01-11 VITALS
OXYGEN SATURATION: 95 % | DIASTOLIC BLOOD PRESSURE: 81 MMHG | SYSTOLIC BLOOD PRESSURE: 149 MMHG | HEART RATE: 93 BPM | TEMPERATURE: 98 F | RESPIRATION RATE: 16 BRPM

## 2024-01-11 DIAGNOSIS — Z96.652 PRESENCE OF LEFT ARTIFICIAL KNEE JOINT: Chronic | ICD-10-CM

## 2024-01-11 PROCEDURE — 99223 1ST HOSP IP/OBS HIGH 75: CPT | Mod: 25

## 2024-01-11 PROCEDURE — 31575 DIAGNOSTIC LARYNGOSCOPY: CPT

## 2024-01-11 PROCEDURE — 93010 ELECTROCARDIOGRAM REPORT: CPT

## 2024-01-11 RX ORDER — LATANOPROST 0.05 MG/ML
1 SOLUTION/ DROPS OPHTHALMIC; TOPICAL AT BEDTIME
Refills: 0 | Status: DISCONTINUED | OUTPATIENT
Start: 2024-01-11 | End: 2024-01-12

## 2024-01-11 RX ORDER — LATANOPROST 0.05 MG/ML
1 SOLUTION/ DROPS OPHTHALMIC; TOPICAL AT BEDTIME
Refills: 0 | Status: DISCONTINUED | OUTPATIENT
Start: 2024-01-11 | End: 2024-01-11

## 2024-01-11 RX ORDER — LISINOPRIL 2.5 MG/1
10 TABLET ORAL DAILY
Refills: 0 | Status: DISCONTINUED | OUTPATIENT
Start: 2024-01-11 | End: 2024-01-12

## 2024-01-11 RX ORDER — ALBUTEROL 90 UG/1
2 AEROSOL, METERED ORAL EVERY 6 HOURS
Refills: 0 | Status: DISCONTINUED | OUTPATIENT
Start: 2024-01-11 | End: 2024-01-12

## 2024-01-11 RX ORDER — ACETAMINOPHEN 500 MG
650 TABLET ORAL EVERY 6 HOURS
Refills: 0 | Status: DISCONTINUED | OUTPATIENT
Start: 2024-01-11 | End: 2024-01-12

## 2024-01-11 RX ORDER — INFLUENZA VIRUS VACCINE 15; 15; 15; 15 UG/.5ML; UG/.5ML; UG/.5ML; UG/.5ML
0.7 SUSPENSION INTRAMUSCULAR ONCE
Refills: 0 | Status: DISCONTINUED | OUTPATIENT
Start: 2024-01-11 | End: 2024-02-01

## 2024-01-11 RX ORDER — OXYCODONE HYDROCHLORIDE 5 MG/1
2.5 TABLET ORAL EVERY 6 HOURS
Refills: 0 | Status: DISCONTINUED | OUTPATIENT
Start: 2024-01-11 | End: 2024-01-12

## 2024-01-11 RX ORDER — CELECOXIB 200 MG/1
200 CAPSULE ORAL
Refills: 0 | Status: DISCONTINUED | OUTPATIENT
Start: 2024-01-11 | End: 2024-01-12

## 2024-01-11 RX ORDER — LANOLIN ALCOHOL/MO/W.PET/CERES
3 CREAM (GRAM) TOPICAL AT BEDTIME
Refills: 0 | Status: DISCONTINUED | OUTPATIENT
Start: 2024-01-11 | End: 2024-01-12

## 2024-01-11 RX ADMIN — LATANOPROST 1 DROP(S): 0.05 SOLUTION/ DROPS OPHTHALMIC; TOPICAL at 23:09

## 2024-01-11 NOTE — H&P ADULT - HISTORY OF PRESENT ILLNESS
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 Patient lives and receives care in [facility]. He reports increased cough and mucous production associated with intermittent episodes of worsening shortness of breath for the past month. This has prompted ED visits for evaluation and discharge with steroid courses, which temporarily improved his breathing difficulty, however, patient also has glaucoma and has been advised by his ophthalmologist to avoid futures courses of steroids. He has had a hoarse, breathy voice for the past six months. Son in law reports significant weight loss over past few months. He is PEG-dependent and does not have any oral intake. Non-smoker, social alcohol use.    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 Patient lives and receives care in [facility]. He reports increased cough and mucous production associated with intermittent episodes of worsening shortness of breath for the past month. This has prompted ED visits for evaluation and discharge with steroid courses, which temporarily improved his breathing difficulty, however, patient also has glaucoma and has been advised by his ophthalmologist to avoid futures courses of steroids. He has had a hoarse, breathy voice for the past six months. Son in law reports significant weight loss over past few months. He is PEG-dependent and does not have any oral intake. Non-smoker, social alcohol use.    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 Patient lives and receives care in [facility]. He reports increased cough and mucous production associated with intermittent episodes of worsening shortness of breath for the past month. This has prompted ED visits for evaluation and discharge with steroid courses, which temporarily improved his breathing difficulty, however, patient also has glaucoma and has been advised by his ophthalmologist to avoid futures courses of steroids. He has had a hoarse, breathy voice for the past six months. Son in law reports significant weight loss over past few months. He is PEG-dependent and does not have any oral intake. Non-smoker, social alcohol use.    Active Problems   COPD (chronic obstructive pulmonary disease) (496) (J44.9)  Glaucoma (365.9) (H40.9)  Hard of hearing (389.9) (H91.90)  HTN (hypertension) (401.9) (I10)  Osteoarthritis (715.90) (M19.90)  Pre-op exam (V72.84) (Z01.818)  Pre-operative laboratory examination (V72.63) (Z01.812)    Surgical History  History of Cataract Surgery  History of Hernia Repair  History of Total Knee Replacement Left    Social History  Caffeine use (V49.89) (Z78.9)  Lives in [place]  Non-smoker (V49.89) (Z78.9)  Social alcohol use (V49.89) (Z78.9)

## 2024-01-11 NOTE — H&P ADULT - ATTENDING COMMENTS
I agree with the history, exam, physical exam as well as laryngoscopy findings.  Also agree with the assessment and plan as outlined above.  I personally evaluated the patient.

## 2024-01-11 NOTE — PATIENT PROFILE ADULT - FALL HARM RISK - HARM RISK INTERVENTIONS
Assistance with ambulation/Assistance OOB with selected safe patient handling equipment/Communicate Risk of Fall with Harm to all staff/Monitor gait and stability/Reinforce activity limits and safety measures with patient and family/Sit up slowly, dangle for a short time, stand at bedside before walking/Tailored Fall Risk Interventions/Use of alarms - bed, chair and/or voice tab/Visual Cue: Yellow wristband and red socks/Bed in lowest position, wheels locked, appropriate side rails in place/Call bell, personal items and telephone in reach/Instruct patient to call for assistance before getting out of bed or chair/Non-slip footwear when patient is out of bed/[place] to call system/Physically safe environment - no spills, clutter or unnecessary equipment/Purposeful Proactive Rounding/Room/bathroom lighting operational, light cord in reach Assistance with ambulation/Assistance OOB with selected safe patient handling equipment/Communicate Risk of Fall with Harm to all staff/Monitor gait and stability/Reinforce activity limits and safety measures with patient and family/Sit up slowly, dangle for a short time, stand at bedside before walking/Tailored Fall Risk Interventions/Use of alarms - bed, chair and/or voice tab/Visual Cue: Yellow wristband and red socks/Bed in lowest position, wheels locked, appropriate side rails in place/Call bell, personal items and telephone in reach/Instruct patient to call for assistance before getting out of bed or chair/Non-slip footwear when patient is out of bed/[place] to call system/Physically safe environment - no spills, clutter or unnecessary equipment/Purposeful Proactive Rounding/Room/bathroom lighting operational, light cord in reach

## 2024-01-11 NOTE — H&P ADULT - ASSESSMENT
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 FOE: large ulcerative lesion R arytenoid/FVF obscuring view of R TVF but likely immobile, L nodular arytenoid mass, L TVF mobile. However, patient in no acute distress, non-stridulous, breathing comfortably on RA.    Plan:  - Admit to regional under ENT, Dr. 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 FOE: large ulcerative lesion R arytenoid/FVF obscuring view of R TVF but likely immobile, L nodular arytenoid mass, L TVF mobile. However, patient in no acute distress, non-stridulous, breathing comfortably on RA.    Plan:  - Admit to regional under ENT, Dr. 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 FOE: large ulcerative lesion R arytenoid/FVF obscuring view of R TVF but likely immobile, L nodular arytenoid mass, L TVF mobile. However, patient in no acute distress, non-stridulous, breathing comfortably on RA.    Plan:  - Admit to regional under ENT, Dr. Crisostomo  - Preop labs for 1/12  - Home meds  - Will obtain consent for OR 1/12  - NPO after midnight  - Added on for DLB / trach likely 1/12  - F/u CT neck and chest

## 2024-01-11 NOTE — H&P ADULT - NSHPPHYSICALEXAM_GEN_ALL_CORE
Physical Exam:  General: NAD, breathing comfortably on RA at rest, no stridor. thin  Voice: hoarse, speaks in short sentences, breathy  OU: EOMI; PERRL; no drainage or redness  AU: external ears normal  Nose: nares patent  Mouth: No oral lesions; no gross abnormalities  Neck: trachea midline  Respiratory: unlabored respirations  Cardiovascular: regular rate  Gastrointestinal: Soft, nondistended. PEG in place  Extremities: No edema, warm and well perfused  Skin: No lesions; no rash in head and neck area    Procedure: Flexible laryngoscopy    Pre-procedure diagnosis/Indication for procedure: To evaluate larynx    Description:    A flexible endoscope was used to examine the left and right nasal cavities, posterior oropharynx, hypopharynx, and larynx. The nasal valve areas were examined for abnormalities or collapse. The inferior and middle turbinates were evaluated. The middle and superior meatuses, the sphenoethmoid recesses, and the nasopharynx were examined and inspected for mucopurulence, polyps, and nasal masses. The oropharynx, tongue base/vallecula, epiglottis, aryepiglottic folds, arytenoids, vocal cords, hypopharynx were also inspected for swelling, inflammation, or dysfunction.    Nasopharynx wnl  BOT/vallecula normal  Epiglottis sharp, copious secretions in supraglottis / glottis  Large ulcerative lesion involving R arytenoid/FVF obscuring view of R TVF but likely immobile, L nodular arytenoid mass, L TVF mobile.   No active bleeding or significant obstruction noted in supraglottic area.

## 2024-01-11 NOTE — PATIENT PROFILE ADULT - ARRIVAL FROM
[facility]/Hospitals/Psychiatric Facilities [facility]/Hospitals/Psychiatric Facilities

## 2024-01-11 NOTE — PATIENT PROFILE ADULT - NS PRO AD PATIENT TYPE
son in law, Randy sweeney [phone]/Health Care Proxy (HCP) son in law, Randy sweeney [phone]/Health Care Proxy (HCP)

## 2024-01-12 ENCOUNTER — RESULT REVIEW (OUTPATIENT)
Age: 89
End: 2024-01-12

## 2024-01-12 LAB
ANION GAP SERPL CALC-SCNC: 7 MMOL/L — SIGNIFICANT CHANGE UP (ref 5–17)
ANION GAP SERPL CALC-SCNC: 7 MMOL/L — SIGNIFICANT CHANGE UP (ref 5–17)
BLD GP AB SCN SERPL QL: NEGATIVE — SIGNIFICANT CHANGE UP
BLD GP AB SCN SERPL QL: NEGATIVE — SIGNIFICANT CHANGE UP
BUN SERPL-MCNC: 24 MG/DL — HIGH (ref 7–23)
BUN SERPL-MCNC: 24 MG/DL — HIGH (ref 7–23)
CALCIUM SERPL-MCNC: 8.7 MG/DL — SIGNIFICANT CHANGE UP (ref 8.4–10.5)
CALCIUM SERPL-MCNC: 8.7 MG/DL — SIGNIFICANT CHANGE UP (ref 8.4–10.5)
CHLORIDE SERPL-SCNC: 94 MMOL/L — LOW (ref 96–108)
CHLORIDE SERPL-SCNC: 94 MMOL/L — LOW (ref 96–108)
CO2 SERPL-SCNC: 31 MMOL/L — SIGNIFICANT CHANGE UP (ref 22–31)
CO2 SERPL-SCNC: 31 MMOL/L — SIGNIFICANT CHANGE UP (ref 22–31)
CREAT SERPL-MCNC: 0.69 MG/DL — SIGNIFICANT CHANGE UP (ref 0.5–1.3)
CREAT SERPL-MCNC: 0.69 MG/DL — SIGNIFICANT CHANGE UP (ref 0.5–1.3)
EGFR: 89 ML/MIN/1.73M2 — SIGNIFICANT CHANGE UP
EGFR: 89 ML/MIN/1.73M2 — SIGNIFICANT CHANGE UP
GLUCOSE SERPL-MCNC: 136 MG/DL — HIGH (ref 70–99)
GLUCOSE SERPL-MCNC: 136 MG/DL — HIGH (ref 70–99)
HCT VFR BLD CALC: 35.7 % — LOW (ref 39–50)
HCT VFR BLD CALC: 35.7 % — LOW (ref 39–50)
HGB BLD-MCNC: 11.8 G/DL — LOW (ref 13–17)
HGB BLD-MCNC: 11.8 G/DL — LOW (ref 13–17)
MAGNESIUM SERPL-MCNC: 2 MG/DL — SIGNIFICANT CHANGE UP (ref 1.6–2.6)
MAGNESIUM SERPL-MCNC: 2 MG/DL — SIGNIFICANT CHANGE UP (ref 1.6–2.6)
MCHC RBC-ENTMCNC: 32 PG — SIGNIFICANT CHANGE UP (ref 27–34)
MCHC RBC-ENTMCNC: 32 PG — SIGNIFICANT CHANGE UP (ref 27–34)
MCHC RBC-ENTMCNC: 33.1 GM/DL — SIGNIFICANT CHANGE UP (ref 32–36)
MCHC RBC-ENTMCNC: 33.1 GM/DL — SIGNIFICANT CHANGE UP (ref 32–36)
MCV RBC AUTO: 96.7 FL — SIGNIFICANT CHANGE UP (ref 80–100)
MCV RBC AUTO: 96.7 FL — SIGNIFICANT CHANGE UP (ref 80–100)
NRBC # BLD: 0 /100 WBCS — SIGNIFICANT CHANGE UP (ref 0–0)
NRBC # BLD: 0 /100 WBCS — SIGNIFICANT CHANGE UP (ref 0–0)
PHOSPHATE SERPL-MCNC: 2.9 MG/DL — SIGNIFICANT CHANGE UP (ref 2.5–4.5)
PHOSPHATE SERPL-MCNC: 2.9 MG/DL — SIGNIFICANT CHANGE UP (ref 2.5–4.5)
PLATELET # BLD AUTO: 184 K/UL — SIGNIFICANT CHANGE UP (ref 150–400)
PLATELET # BLD AUTO: 184 K/UL — SIGNIFICANT CHANGE UP (ref 150–400)
POTASSIUM SERPL-MCNC: 4.4 MMOL/L — SIGNIFICANT CHANGE UP (ref 3.5–5.3)
POTASSIUM SERPL-MCNC: 4.4 MMOL/L — SIGNIFICANT CHANGE UP (ref 3.5–5.3)
POTASSIUM SERPL-SCNC: 4.4 MMOL/L — SIGNIFICANT CHANGE UP (ref 3.5–5.3)
POTASSIUM SERPL-SCNC: 4.4 MMOL/L — SIGNIFICANT CHANGE UP (ref 3.5–5.3)
RBC # BLD: 3.69 M/UL — LOW (ref 4.2–5.8)
RBC # BLD: 3.69 M/UL — LOW (ref 4.2–5.8)
RBC # FLD: 13 % — SIGNIFICANT CHANGE UP (ref 10.3–14.5)
RBC # FLD: 13 % — SIGNIFICANT CHANGE UP (ref 10.3–14.5)
RH IG SCN BLD-IMP: NEGATIVE — SIGNIFICANT CHANGE UP
RH IG SCN BLD-IMP: NEGATIVE — SIGNIFICANT CHANGE UP
SODIUM SERPL-SCNC: 132 MMOL/L — LOW (ref 135–145)
SODIUM SERPL-SCNC: 132 MMOL/L — LOW (ref 135–145)
WBC # BLD: 11.14 K/UL — HIGH (ref 3.8–10.5)
WBC # BLD: 11.14 K/UL — HIGH (ref 3.8–10.5)
WBC # FLD AUTO: 11.14 K/UL — HIGH (ref 3.8–10.5)
WBC # FLD AUTO: 11.14 K/UL — HIGH (ref 3.8–10.5)

## 2024-01-12 PROCEDURE — 31600 PLANNED TRACHEOSTOMY: CPT

## 2024-01-12 PROCEDURE — 88305 TISSUE EXAM BY PATHOLOGIST: CPT | Mod: 26

## 2024-01-12 PROCEDURE — 31536 LARYNGOSCOPY W/BX & OP SCOPE: CPT

## 2024-01-12 PROCEDURE — 99252 IP/OBS CONSLTJ NEW/EST SF 35: CPT | Mod: GC

## 2024-01-12 PROCEDURE — 71045 X-RAY EXAM CHEST 1 VIEW: CPT | Mod: 26

## 2024-01-12 DEVICE — TRACH CUFF BLUSELECT 7.5MM: Type: IMPLANTABLE DEVICE | Status: FUNCTIONAL

## 2024-01-12 RX ORDER — HYDROMORPHONE HYDROCHLORIDE 2 MG/ML
0.5 INJECTION INTRAMUSCULAR; INTRAVENOUS; SUBCUTANEOUS
Refills: 0 | Status: DISCONTINUED | OUTPATIENT
Start: 2024-01-12 | End: 2024-01-13

## 2024-01-12 RX ORDER — CHLORHEXIDINE GLUCONATE 213 G/1000ML
1 SOLUTION TOPICAL
Refills: 0 | Status: DISCONTINUED | OUTPATIENT
Start: 2024-01-12 | End: 2024-01-25

## 2024-01-12 RX ORDER — SODIUM CHLORIDE 9 MG/ML
1000 INJECTION, SOLUTION INTRAVENOUS
Refills: 0 | Status: DISCONTINUED | OUTPATIENT
Start: 2024-01-12 | End: 2024-01-12

## 2024-01-12 RX ORDER — ACETAMINOPHEN 500 MG
650 TABLET ORAL EVERY 6 HOURS
Refills: 0 | Status: DISCONTINUED | OUTPATIENT
Start: 2024-01-12 | End: 2024-02-01

## 2024-01-12 RX ORDER — ONDANSETRON 8 MG/1
4 TABLET, FILM COATED ORAL EVERY 6 HOURS
Refills: 0 | Status: DISCONTINUED | OUTPATIENT
Start: 2024-01-12 | End: 2024-02-01

## 2024-01-12 RX ORDER — IPRATROPIUM BROMIDE 0.2 MG/ML
500 SOLUTION, NON-ORAL INHALATION ONCE
Refills: 0 | Status: COMPLETED | OUTPATIENT
Start: 2024-01-12 | End: 2024-01-12

## 2024-01-12 RX ORDER — ALBUTEROL 90 UG/1
2 AEROSOL, METERED ORAL EVERY 6 HOURS
Refills: 0 | Status: DISCONTINUED | OUTPATIENT
Start: 2024-01-12 | End: 2024-02-01

## 2024-01-12 RX ORDER — SODIUM CHLORIDE 9 MG/ML
1000 INJECTION, SOLUTION INTRAVENOUS
Refills: 0 | Status: DISCONTINUED | OUTPATIENT
Start: 2024-01-12 | End: 2024-01-13

## 2024-01-12 RX ORDER — OXYCODONE AND ACETAMINOPHEN 5; 325 MG/1; MG/1
1 TABLET ORAL EVERY 4 HOURS
Refills: 0 | Status: DISCONTINUED | OUTPATIENT
Start: 2024-01-12 | End: 2024-01-13

## 2024-01-12 RX ADMIN — LISINOPRIL 10 MILLIGRAM(S): 2.5 TABLET ORAL at 05:12

## 2024-01-12 RX ADMIN — Medication 500 MICROGRAM(S): at 11:09

## 2024-01-12 RX ADMIN — SODIUM CHLORIDE 60 MILLILITER(S): 9 INJECTION, SOLUTION INTRAVENOUS at 10:51

## 2024-01-12 NOTE — BRIEF OPERATIVE NOTE - NSICDXBRIEFPROCEDURE_GEN_ALL_CORE_FT
PROCEDURES:  Creation, tracheostomy, open 12-Jan-2024 18:39:02  Bebo Coombs  Direct laryngoscopy with biopsy 12-Jan-2024 18:39:19  Bebo Coombs

## 2024-01-12 NOTE — PROGRESS NOTE ADULT - SUBJECTIVE AND OBJECTIVE BOX
OTOLARYNGOLOGY (ENT) PROGRESS NOTE    PATIENT: KUSUM GROVE  MRN: [id number]  : 35  [id number]  DATE OF SERVICE:  24  			         ID:KUSUM GROVE is an 88M with history of known R sided laryngeal mass (known to Dr. 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 Patient lives and receives care in [facility]. He reports increased cough and mucous production associated with intermittent episodes of worsening shortness of breath for the past month. This has prompted ED visits for evaluation and discharge with steroid courses, which temporarily improved his breathing difficulty, however, patient also has glaucoma and has been advised by his ophthalmologist to avoid futures courses of steroids. He has had a hoarse, breathy voice for the past six months. Son in law reports significant weight loss over past few months. He is PEG-dependent and does not have any oral intake. Non-smoker, social alcohol use.      Subjective/ Interval:   : AFVSS on RA ON. Patient seen and evaluated this am. Pt with inspiratory stridor and increased secretions.     ALLERGIES:  penicillin (Rash)      MEDICATIONS:  Antiinfectives:     IV fluids:    Hematologic/Anticoagulation:    Pain medications/Neuro:  acetaminophen   Oral Liquid .. 650 milliGRAM(s) Oral every 6 hours PRN  celecoxib 200 milliGRAM(s) Oral two times a day  melatonin Liquid 3 milliGRAM(s) Oral at bedtime PRN  oxyCODONE    Solution 2.5 milliGRAM(s) Oral every 6 hours PRN    Endocrine Medications:     All other standing medications:   influenza  Vaccine (HIGH DOSE) 0.7 milliLiter(s) IntraMuscular once  latanoprost 0.005% Ophthalmic Solution 1 Drop(s) Both EYES at bedtime  lisinopril 10 milliGRAM(s) Oral daily    All other PRN medications:  albuterol    90 MICROgram(s) HFA Inhaler 2 Puff(s) Inhalation every 6 hours PRN    Vital Signs Last 24 Hrs  T(C): 36.8 (2024 04:53), Max: 36.9 (2024 19:15)  T(F): 98.3 (2024 04:53), Max: 98.4 (2024 19:15)  HR: 78 (2024 04:53) (78 - 93)  BP: 131/76 (2024 04:53) (131/76 - 149/81)  BP(mean): --  RR: 17 (2024 04:53) (16 - 17)  SpO2: 96% (2024 04:53) (95% - 96%)    Parameters below as of 2024 04:53  Patient On (Oxygen Delivery Method): room air      PHYSICAL EXAM:  GEN: appears stated age  NEURO: alert & oriented x   HEENT: inspiratory stridor noted, worse when speaking. Increased coughing.  CVS: regular rate and rhythm  Pulm: normal respiratory excursions, not tachypneic  Abd: non-distended  Ext: moving all four extremities, no peripheral edema noted       LABS                       11.8   11.14 )-----------( 184      ( 2024 05:30 )             35.7    12    132<L>  |  94<L>  |  24<H>  ----------------------------<  136<H>  4.4   |  31  |  0.69    Ca    8.7      2024 05:30  Phos  2.9       Mg     2.0     12           Coagulation Studies-     Urinalysis Basic - ( 2024 05:30 )    Color: x / Appearance: x / SG: x / pH: x  Gluc: 136 mg/dL / Ketone: x  / Bili: x / Urobili: x   Blood: x / Protein: x / Nitrite: x   Leuk Esterase: x / RBC: x / WBC x   Sq Epi: x / Non Sq Epi: x / Bacteria: x      Endocrine Panel-  Calcium: 8.7 mg/dL ( @ 05:30)               OTOLARYNGOLOGY (ENT) PROGRESS NOTE    PATIENT: KUSUM GROVE  MRN: [id number]  : 35  [id number]  DATE OF SERVICE:  24  			         ID:KUSUM GROVE is an 88M with history of known R sided laryngeal mass (known to Dr. 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 Patient lives and receives care in [facility]. He reports increased cough and mucous production associated with intermittent episodes of worsening shortness of breath for the past month. This has prompted ED visits for evaluation and discharge with steroid courses, which temporarily improved his breathing difficulty, however, patient also has glaucoma and has been advised by his ophthalmologist to avoid futures courses of steroids. He has had a hoarse, breathy voice for the past six months. Son in law reports significant weight loss over past few months. He is PEG-dependent and does not have any oral intake. Non-smoker, social alcohol use.      Subjective/ Interval:   : AFVSS on RA ON. Patient seen and evaluated this am. Pt with inspiratory stridor and increased secretions.     ALLERGIES:  penicillin (Rash)      MEDICATIONS:  Antiinfectives:     IV fluids:    Hematologic/Anticoagulation:    Pain medications/Neuro:  acetaminophen   Oral Liquid .. 650 milliGRAM(s) Oral every 6 hours PRN  celecoxib 200 milliGRAM(s) Oral two times a day  melatonin Liquid 3 milliGRAM(s) Oral at bedtime PRN  oxyCODONE    Solution 2.5 milliGRAM(s) Oral every 6 hours PRN    Endocrine Medications:     All other standing medications:   influenza  Vaccine (HIGH DOSE) 0.7 milliLiter(s) IntraMuscular once  latanoprost 0.005% Ophthalmic Solution 1 Drop(s) Both EYES at bedtime  lisinopril 10 milliGRAM(s) Oral daily    All other PRN medications:  albuterol    90 MICROgram(s) HFA Inhaler 2 Puff(s) Inhalation every 6 hours PRN    Vital Signs Last 24 Hrs  T(C): 36.8 (2024 04:53), Max: 36.9 (2024 19:15)  T(F): 98.3 (2024 04:53), Max: 98.4 (2024 19:15)  HR: 78 (2024 04:53) (78 - 93)  BP: 131/76 (2024 04:53) (131/76 - 149/81)  BP(mean): --  RR: 17 (2024 04:53) (16 - 17)  SpO2: 96% (2024 04:53) (95% - 96%)    Parameters below as of 2024 04:53  Patient On (Oxygen Delivery Method): room air      PHYSICAL EXAM:  GEN: appears stated age  NEURO: alert & oriented x   HEENT: inspiratory stridor noted, worse when speaking. Increased coughing.  CVS: regular rate and rhythm  Pulm: normal respiratory excursions, not tachypneic  Abd: non-distended  Ext: moving all four extremities, no peripheral edema noted       LABS                       11.8   11.14 )-----------( 184      ( 2024 05:30 )             35.7    12    132<L>  |  94<L>  |  24<H>  ----------------------------<  136<H>  4.4   |  31  |  0.69    Ca    8.7      2024 05:30  Phos  2.9       Mg     2.0     12           Coagulation Studies-     Urinalysis Basic - ( 2024 05:30 )    Color: x / Appearance: x / SG: x / pH: x  Gluc: 136 mg/dL / Ketone: x  / Bili: x / Urobili: x   Blood: x / Protein: x / Nitrite: x   Leuk Esterase: x / RBC: x / WBC x   Sq Epi: x / Non Sq Epi: x / Bacteria: x      Endocrine Panel-  Calcium: 8.7 mg/dL ( @ 05:30)               OTOLARYNGOLOGY (ENT) PROGRESS NOTE    PATIENT: KUSUM GROVE  MRN: [id number]  : 35  [id number]  DATE OF SERVICE:  24  			         ID:KUSUM GROVE is an 88M with history of known R sided laryngeal mass (known to Dr. 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 Patient lives and receives care in [facility]. He reports increased cough and mucous production associated with intermittent episodes of worsening shortness of breath for the past month. This has prompted ED visits for evaluation and discharge with steroid courses, which temporarily improved his breathing difficulty, however, patient also has glaucoma and has been advised by his ophthalmologist to avoid futures courses of steroids. He has had a hoarse, breathy voice for the past six months. Son in law reports significant weight loss over past few months. He is PEG-dependent and does not have any oral intake. Non-smoker, social alcohol use.      Subjective/ Interval:   : AFVSS on RA ON. Patient seen and evaluated this am. Pt with inspiratory stridor and increased secretions.     ALLERGIES:  penicillin (Rash)      MEDICATIONS:  Antiinfectives:     IV fluids:    Hematologic/Anticoagulation:    Pain medications/Neuro:  acetaminophen   Oral Liquid .. 650 milliGRAM(s) Oral every 6 hours PRN  celecoxib 200 milliGRAM(s) Oral two times a day  melatonin Liquid 3 milliGRAM(s) Oral at bedtime PRN  oxyCODONE    Solution 2.5 milliGRAM(s) Oral every 6 hours PRN    Endocrine Medications:     All other standing medications:   influenza  Vaccine (HIGH DOSE) 0.7 milliLiter(s) IntraMuscular once  latanoprost 0.005% Ophthalmic Solution 1 Drop(s) Both EYES at bedtime  lisinopril 10 milliGRAM(s) Oral daily    All other PRN medications:  albuterol    90 MICROgram(s) HFA Inhaler 2 Puff(s) Inhalation every 6 hours PRN    Vital Signs Last 24 Hrs  T(C): 36.8 (2024 04:53), Max: 36.9 (2024 19:15)  T(F): 98.3 (2024 04:53), Max: 98.4 (2024 19:15)  HR: 78 (2024 04:53) (78 - 93)  BP: 131/76 (2024 04:53) (131/76 - 149/81)  BP(mean): --  RR: 17 (2024 04:53) (16 - 17)  SpO2: 96% (2024 04:53) (95% - 96%)    Parameters below as of 2024 04:53  Patient On (Oxygen Delivery Method): room air      PHYSICAL EXAM:  GEN: appears stated age  NEURO: alert & oriented x   HEENT: inspiratory stridor noted, worse when speaking. Increased coughing.  CVS: regular rate and rhythm  Pulm: normal respiratory excursions, not tachypneic  Abd: non-distended  Ext: moving all four extremities, no peripheral edema noted       LABS                       11.8   11.14 )-----------( 184      ( 2024 05:30 )             35.7    12    132<L>  |  94<L>  |  24<H>  ----------------------------<  136<H>  4.4   |  31  |  0.69    Ca    8.7      2024 05:30  Phos  2.9       Mg     2.0     12           Coagulation Studies-     Urinalysis Basic - ( 2024 05:30 )    Color: x / Appearance: x / SG: x / pH: x  Gluc: 136 mg/dL / Ketone: x  / Bili: x / Urobili: x   Blood: x / Protein: x / Nitrite: x   Leuk Esterase: x / RBC: x / WBC x   Sq Epi: x / Non Sq Epi: x / Bacteria: x      Endocrine Panel-  Calcium: 8.7 mg/dL ( @ 05:30)

## 2024-01-12 NOTE — CONSULT NOTE ADULT - SUBJECTIVE AND OBJECTIVE BOX
HPI:  88M with history of HTN, COPD, glaucoma, hard of hearing, osteoarthritis, known R sided laryngeal mass (known to Dr. 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 Patient lives and receives care in [facility]. He reports increased cough and mucous production associated with intermittent episodes of worsening shortness of breath for the past month, 6 months of progressive voice hoarseness and multiple recent rounds of steroid use. In the ED, pt was noted to have large ulcerative lesion R arytenoid/FVF obscuring view of R TVF but likely immobile, L nodular arytenoid mass, L TVF mobile on laryngocopy. Pt was admitted to ENT service and subsequently taken to the OR on 1/12 for direct laryngoscopy w/ biopsy and tracheostomy placement. Intraop course uncomplicated, tolerating TC. Transferred to the SICU post op for airway monitoring due to increased secretion burden.     1/12: Direct laryngoscopy w/ biopsy and tracheostomy placement    Active Problems   COPD (chronic obstructive pulmonary disease) (496) (J44.9)  Glaucoma (365.9) (H40.9)  Hard of hearing (389.9) (H91.90)  HTN (hypertension) (401.9) (I10)  Osteoarthritis (715.90) (M19.90)  Pre-op exam (V72.84) (Z01.818)  Pre-operative laboratory examination (V72.63) (Z01.812)    Surgical History  History of Cataract Surgery  History of Hernia Repair  History of Total Knee Replacement Left    Social History  Caffeine use (V49.89) (Z78.9)  Lives in [place]  Non-smoker (V49.89) (Z78.9)  Social alcohol use (V49.89) (Z78.9) (11 Jan 2024 19:40)    PAST MEDICAL & SURGICAL HISTORY:  [place] (hard of hearing)  HTN (hypertension)  Glaucoma  Deep vein thrombosis (DVT)  left knee, 25 yrs ago  Dysphagia    S/P total knee replacement, left      ROS: See HPI    MEDICATIONS:  ALLERGIES:    SOCIAL HISTORY:  Smoke: Never Smoker  EtOH: occasional    Vital Signs Last 24 Hrs  T(C): 36.5 (12 Jan 2024 18:22), Max: 36.8 (12 Jan 2024 04:53)  T(F): 97.7 (12 Jan 2024 18:22), Max: 98.3 (12 Jan 2024 04:53)  HR: 86 (12 Jan 2024 19:22) (76 - 90)  BP: 104/65 (12 Jan 2024 19:22) (99/60 - 131/76)  BP(mean): 80 (12 Jan 2024 19:22) (74 - 86)  RR: 15 (12 Jan 2024 19:22) (14 - 22)  SpO2: 98% (12 Jan 2024 19:22) (94% - 100%)    Parameters below as of 12 Jan 2024 19:22  Patient On (Oxygen Delivery Method): tracheostomy collar    O2 Concentration (%): 35    PHYSICAL EXAM    Gen: NAD  CV: RRR  Pulm: CTAB  Abd: Soft, NT/ND    LABS:                        11.8   11.14 )-----------( 184      ( 12 Jan 2024 05:30 )             35.7     01-12    132<L>  |  94<L>  |  24<H>  ----------------------------<  136<H>  4.4   |  31  |  0.69    Ca    8.7      12 Jan 2024 05:30  Phos  2.9     01-12  Mg     2.0     01-12        Urinalysis Basic - ( 12 Jan 2024 05:30 )    Color: x / Appearance: x / SG: x / pH: x  Gluc: 136 mg/dL / Ketone: x  / Bili: x / Urobili: x   Blood: x / Protein: x / Nitrite: x   Leuk Esterase: x / RBC: x / WBC x   Sq Epi: x / Non Sq Epi: x / Bacteria: x      RADIOLOGY & ADDITIONAL STUDIES:    ASSESSMENT AND PLAN:  88M with history of known R sided laryngeal mass (known to Dr. 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   CV: MAP > 65  Pulm: TC as tolerated, nebs q6 standing,   GI: s/p PEG, resume TF  : Strict I and O. LR @60  Endo: ISS  Heme: no active issues  DVT ppx on hold until 1/13  Lines/Drains: PIV   Dispo: SICU HPI:  88M with history of HTN, COPD, glaucoma, hard of hearing, osteoarthritis, known R sided laryngeal mass (known to Dr. 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 Patient lives and receives care in [facility]. He reports increased cough and mucous production associated with intermittent episodes of worsening shortness of breath for the past month, 6 months of progressive voice hoarseness and multiple recent rounds of steroid use. In the ED, pt was noted to have large ulcerative lesion R arytenoid/FVF obscuring view of R TVF but likely immobile, L nodular arytenoid mass, L TVF mobile on laryngocopy. Pt was admitted to ENT service and subsequently taken to the OR on 1/12 for direct laryngoscopy w/ biopsy and tracheostomy placement. Intraop course uncomplicated, tolerating TC. Transferred to the SICU post op for airway monitoring due to increased secretion burden.     1/12: Direct laryngoscopy w/ biopsy and tracheostomy placement    Active Problems   COPD (chronic obstructive pulmonary disease) (496) (J44.9)  Glaucoma (365.9) (H40.9)  Hard of hearing (389.9) (H91.90)  HTN (hypertension) (401.9) (I10)  Osteoarthritis (715.90) (M19.90)  Pre-op exam (V72.84) (Z01.818)  Pre-operative laboratory examination (V72.63) (Z01.812)    Surgical History  History of Cataract Surgery  History of Hernia Repair  History of Total Knee Replacement Left    Social History  Caffeine use (V49.89) (Z78.9)  Lives in [place]  Non-smoker (V49.89) (Z78.9)  Social alcohol use (V49.89) (Z78.9) (11 Jan 2024 19:40)    PAST MEDICAL & SURGICAL HISTORY:  [place] (hard of hearing)  HTN (hypertension)  Glaucoma  Deep vein thrombosis (DVT)  left knee, 25 yrs ago  Dysphagia    S/P total knee replacement, left      ROS: See HPI    MEDICATIONS:  ALLERGIES:    SOCIAL HISTORY:  Smoke: Never Smoker  EtOH: occasional    Vital Signs Last 24 Hrs  T(C): 36.5 (12 Jan 2024 18:22), Max: 36.8 (12 Jan 2024 04:53)  T(F): 97.7 (12 Jan 2024 18:22), Max: 98.3 (12 Jan 2024 04:53)  HR: 86 (12 Jan 2024 19:22) (76 - 90)  BP: 104/65 (12 Jan 2024 19:22) (99/60 - 131/76)  BP(mean): 80 (12 Jan 2024 19:22) (74 - 86)  RR: 15 (12 Jan 2024 19:22) (14 - 22)  SpO2: 98% (12 Jan 2024 19:22) (94% - 100%)    Parameters below as of 12 Jan 2024 19:22  Patient On (Oxygen Delivery Method): tracheostomy collar    O2 Concentration (%): 35    PHYSICAL EXAM    Gen: NAD  CV: RRR  Pulm: CTAB  Abd: Soft, NT/ND    LABS:                        11.8   11.14 )-----------( 184      ( 12 Jan 2024 05:30 )             35.7     01-12    132<L>  |  94<L>  |  24<H>  ----------------------------<  136<H>  4.4   |  31  |  0.69    Ca    8.7      12 Jan 2024 05:30  Phos  2.9     01-12  Mg     2.0     01-12        Urinalysis Basic - ( 12 Jan 2024 05:30 )    Color: x / Appearance: x / SG: x / pH: x  Gluc: 136 mg/dL / Ketone: x  / Bili: x / Urobili: x   Blood: x / Protein: x / Nitrite: x   Leuk Esterase: x / RBC: x / WBC x   Sq Epi: x / Non Sq Epi: x / Bacteria: x      RADIOLOGY & ADDITIONAL STUDIES:    ASSESSMENT AND PLAN:  88M with history of known R sided laryngeal mass (known to Dr. 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   CV: MAP > 65  Pulm: TC as tolerated, nebs q6 standing,   GI: s/p PEG, resume TF  : Strict I and O. LR @60  Endo: ISS  Heme: no active issues  DVT ppx on hold until 1/13  Lines/Drains: PIV   Dispo: SICU HPI:  88M with history of HTN, COPD, glaucoma, hard of hearing, osteoarthritis, known R sided laryngeal mass (known to Dr. 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 Patient lives and receives care in [facility]. He reports increased cough and mucous production associated with intermittent episodes of worsening shortness of breath for the past month, 6 months of progressive voice hoarseness and multiple recent rounds of steroid use. In the ED, pt was noted to have large ulcerative lesion R arytenoid/FVF obscuring view of R TVF but likely immobile, L nodular arytenoid mass, L TVF mobile on laryngocopy. Pt was admitted to ENT service and subsequently taken to the OR on 1/12 for direct laryngoscopy w/ biopsy and tracheostomy placement. Intraop course uncomplicated, tolerating TC. Transferred to the SICU post op for airway monitoring due to increased secretion burden.     1/12: Direct laryngoscopy w/ biopsy and tracheostomy placement    Active Problems   COPD (chronic obstructive pulmonary disease) (496) (J44.9)  Glaucoma (365.9) (H40.9)  Hard of hearing (389.9) (H91.90)  HTN (hypertension) (401.9) (I10)  Osteoarthritis (715.90) (M19.90)  Pre-op exam (V72.84) (Z01.818)  Pre-operative laboratory examination (V72.63) (Z01.812)    Surgical History  History of Cataract Surgery  History of Hernia Repair  History of Total Knee Replacement Left    Social History  Caffeine use (V49.89) (Z78.9)  Lives in [place]  Non-smoker (V49.89) (Z78.9)  Social alcohol use (V49.89) (Z78.9) (11 Jan 2024 19:40)    PAST MEDICAL & SURGICAL HISTORY:  [place] (hard of hearing)  HTN (hypertension)  Glaucoma  Deep vein thrombosis (DVT)  left knee, 25 yrs ago  Dysphagia    S/P total knee replacement, left      ROS: See HPI    MEDICATIONS:  ALLERGIES:    SOCIAL HISTORY:  Smoke: Never Smoker  EtOH: occasional    Vital Signs Last 24 Hrs  T(C): 36.5 (12 Jan 2024 18:22), Max: 36.8 (12 Jan 2024 04:53)  T(F): 97.7 (12 Jan 2024 18:22), Max: 98.3 (12 Jan 2024 04:53)  HR: 86 (12 Jan 2024 19:22) (76 - 90)  BP: 104/65 (12 Jan 2024 19:22) (99/60 - 131/76)  BP(mean): 80 (12 Jan 2024 19:22) (74 - 86)  RR: 15 (12 Jan 2024 19:22) (14 - 22)  SpO2: 98% (12 Jan 2024 19:22) (94% - 100%)    Parameters below as of 12 Jan 2024 19:22  Patient On (Oxygen Delivery Method): tracheostomy collar    O2 Concentration (%): 35    PHYSICAL EXAM    Gen: NAD  CV: RRR  Pulm: Trach sutured in place, CTAB no rhonchi,crackles  Abd: Soft, NT/ND, peg site c/d/i bumper @4  /Renal: No valdovinos in place  Neuro: AOx3, nonfocal  LABS:                        11.8   11.14 )-----------( 184      ( 12 Jan 2024 05:30 )             35.7     01-12    132<L>  |  94<L>  |  24<H>  ----------------------------<  136<H>  4.4   |  31  |  0.69    Ca    8.7      12 Jan 2024 05:30  Phos  2.9     01-12  Mg     2.0     01-12        Urinalysis Basic - ( 12 Jan 2024 05:30 )    Color: x / Appearance: x / SG: x / pH: x  Gluc: 136 mg/dL / Ketone: x  / Bili: x / Urobili: x   Blood: x / Protein: x / Nitrite: x   Leuk Esterase: x / RBC: x / WBC x   Sq Epi: x / Non Sq Epi: x / Bacteria: x      RADIOLOGY & ADDITIONAL STUDIES:    ASSESSMENT AND PLAN:  88M with history of known R sided laryngeal mass (known to Dr. 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   CV: MAP > 65  Pulm: TC as tolerated, nebs q6 standing,   GI: s/p PEG, resume TF  : Strict I and O. LR @60  Endo: ISS  Heme: no active issues  DVT ppx on hold until 1/13  Lines/Drains: PIV   Dispo: SICU HPI:  88M with history of HTN, COPD, glaucoma, hard of hearing, osteoarthritis, known R sided laryngeal mass (known to Dr. 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 Patient lives and receives care in [facility]. He reports increased cough and mucous production associated with intermittent episodes of worsening shortness of breath for the past month, 6 months of progressive voice hoarseness and multiple recent rounds of steroid use. In the ED, pt was noted to have large ulcerative lesion R arytenoid/FVF obscuring view of R TVF but likely immobile, L nodular arytenoid mass, L TVF mobile on laryngocopy. Pt was admitted to ENT service and subsequently taken to the OR on 1/12 for direct laryngoscopy w/ biopsy and tracheostomy placement. Intraop course uncomplicated, tolerating TC. Transferred to the SICU post op for airway monitoring due to increased secretion burden.     1/12: Direct laryngoscopy w/ biopsy and tracheostomy placement    Active Problems   COPD (chronic obstructive pulmonary disease) (496) (J44.9)  Glaucoma (365.9) (H40.9)  Hard of hearing (389.9) (H91.90)  HTN (hypertension) (401.9) (I10)  Osteoarthritis (715.90) (M19.90)  Pre-op exam (V72.84) (Z01.818)  Pre-operative laboratory examination (V72.63) (Z01.812)    Surgical History  History of Cataract Surgery  History of Hernia Repair  History of Total Knee Replacement Left    Social History  Caffeine use (V49.89) (Z78.9)  Lives in [place]  Non-smoker (V49.89) (Z78.9)  Social alcohol use (V49.89) (Z78.9) (11 Jan 2024 19:40)    PAST MEDICAL & SURGICAL HISTORY:  [place] (hard of hearing)  HTN (hypertension)  Glaucoma  Deep vein thrombosis (DVT)  left knee, 25 yrs ago  Dysphagia    S/P total knee replacement, left      ROS: See HPI    MEDICATIONS:  ALLERGIES:    SOCIAL HISTORY:  Smoke: Never Smoker  EtOH: occasional    Vital Signs Last 24 Hrs  T(C): 36.5 (12 Jan 2024 18:22), Max: 36.8 (12 Jan 2024 04:53)  T(F): 97.7 (12 Jan 2024 18:22), Max: 98.3 (12 Jan 2024 04:53)  HR: 86 (12 Jan 2024 19:22) (76 - 90)  BP: 104/65 (12 Jan 2024 19:22) (99/60 - 131/76)  BP(mean): 80 (12 Jan 2024 19:22) (74 - 86)  RR: 15 (12 Jan 2024 19:22) (14 - 22)  SpO2: 98% (12 Jan 2024 19:22) (94% - 100%)    Parameters below as of 12 Jan 2024 19:22  Patient On (Oxygen Delivery Method): tracheostomy collar    O2 Concentration (%): 35    PHYSICAL EXAM    Gen: NAD  CV: RRR  Pulm: Trach sutured in place, CTAB no rhonchi,crackles  Abd: Soft, NT/ND, peg site c/d/i bumper @4  /Renal: No valdovinos in place  Neuro: AOx3, nonfocal  LABS:                        11.8   11.14 )-----------( 184      ( 12 Jan 2024 05:30 )             35.7     01-12    132<L>  |  94<L>  |  24<H>  ----------------------------<  136<H>  4.4   |  31  |  0.69    Ca    8.7      12 Jan 2024 05:30  Phos  2.9     01-12  Mg     2.0     01-12        Urinalysis Basic - ( 12 Jan 2024 05:30 )    Color: x / Appearance: x / SG: x / pH: x  Gluc: 136 mg/dL / Ketone: x  / Bili: x / Urobili: x   Blood: x / Protein: x / Nitrite: x   Leuk Esterase: x / RBC: x / WBC x   Sq Epi: x / Non Sq Epi: x / Bacteria: x      RADIOLOGY & ADDITIONAL STUDIES:    ASSESSMENT AND PLAN:  88M with history of known R sided laryngeal mass (known to Dr. 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   CV: MAP > 65  Pulm: TC as tolerated, nebs q6 standing,   GI: s/p PEG, resume TF  : Strict I and O. LR @60  Endo: ISS  Heme: no active issues  DVT ppx on hold until 1/13  Lines/Drains: PIV   Dispo: SICU HPI:  88M with history of HTN, COPD, glaucoma, hard of hearing, osteoarthritis, known R sided laryngeal mass (known to Dr. 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 Patient lives and receives care in [facility]. He reports increased cough and mucous production associated with intermittent episodes of worsening shortness of breath for the past month, 6 months of progressive voice hoarseness and multiple recent rounds of steroid use. In the ED, pt was noted to have large ulcerative lesion R arytenoid/FVF obscuring view of R TVF but likely immobile, L nodular arytenoid mass, L TVF mobile on laryngocopy. Pt was admitted to ENT service and subsequently taken to the OR on 1/12 for direct laryngoscopy w/ biopsy and tracheostomy placement. Intraop course uncomplicated, tolerating TC. Transferred to the SICU post op for airway monitoring due to increased secretion burden.     1/12: Direct laryngoscopy w/ biopsy and tracheostomy placement    Active Problems   COPD (chronic obstructive pulmonary disease) (496) (J44.9)  Glaucoma (365.9) (H40.9)  Hard of hearing (389.9) (H91.90)  HTN (hypertension) (401.9) (I10)  Osteoarthritis (715.90) (M19.90)  Pre-op exam (V72.84) (Z01.818)  Pre-operative laboratory examination (V72.63) (Z01.812)    Surgical History  History of Cataract Surgery  History of Hernia Repair  History of Total Knee Replacement Left    Social History  Caffeine use (V49.89) (Z78.9)  Lives in [place]  Non-smoker (V49.89) (Z78.9)  Social alcohol use (V49.89) (Z78.9) (11 Jan 2024 19:40)    PAST MEDICAL & SURGICAL HISTORY:  [place] (hard of hearing)  HTN (hypertension)  Glaucoma  Deep vein thrombosis (DVT)  left knee, 25 yrs ago  Dysphagia    S/P total knee replacement, left      ROS: See HPI    MEDICATIONS:  ALLERGIES:    SOCIAL HISTORY:  Smoke: Never Smoker  EtOH: occasional    Vital Signs Last 24 Hrs  T(C): 36.5 (12 Jan 2024 18:22), Max: 36.8 (12 Jan 2024 04:53)  T(F): 97.7 (12 Jan 2024 18:22), Max: 98.3 (12 Jan 2024 04:53)  HR: 86 (12 Jan 2024 19:22) (76 - 90)  BP: 104/65 (12 Jan 2024 19:22) (99/60 - 131/76)  BP(mean): 80 (12 Jan 2024 19:22) (74 - 86)  RR: 15 (12 Jan 2024 19:22) (14 - 22)  SpO2: 98% (12 Jan 2024 19:22) (94% - 100%)    Parameters below as of 12 Jan 2024 19:22  Patient On (Oxygen Delivery Method): tracheostomy collar    O2 Concentration (%): 35    PHYSICAL EXAM    Gen: NAD  CV: RRR  Pulm: Trach sutured in place, CTAB no rhonchi,crackles  Abd: Soft, NT/ND, peg site c/d/i bumper @4  /Renal: No valdovinos in place  Neuro: AOx3, nonfocal  LABS:                        11.8   11.14 )-----------( 184      ( 12 Jan 2024 05:30 )             35.7     01-12    132<L>  |  94<L>  |  24<H>  ----------------------------<  136<H>  4.4   |  31  |  0.69    Ca    8.7      12 Jan 2024 05:30  Phos  2.9     01-12  Mg     2.0     01-12        Urinalysis Basic - ( 12 Jan 2024 05:30 )    Color: x / Appearance: x / SG: x / pH: x  Gluc: 136 mg/dL / Ketone: x  / Bili: x / Urobili: x   Blood: x / Protein: x / Nitrite: x   Leuk Esterase: x / RBC: x / WBC x   Sq Epi: x / Non Sq Epi: x / Bacteria: x      RADIOLOGY & ADDITIONAL STUDIES:    ASSESSMENT AND PLAN:  88M with history of known R sided laryngeal mass (known to Dr. 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   CV: MAP > 65  Pulm: 35% TC. nebs q6 standing,   GI: s/p PEG, resume TF  : Strict I and O. LR @60  Endo: ISS  Heme: no active issues  DVT ppx on hold until 1/13  Lines/Drains: PIV   Dispo: SICU HPI:  88M with history of HTN, COPD, glaucoma, hard of hearing, osteoarthritis, known R sided laryngeal mass (known to Dr. 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 Patient lives and receives care in [facility]. He reports increased cough and mucous production associated with intermittent episodes of worsening shortness of breath for the past month, 6 months of progressive voice hoarseness and multiple recent rounds of steroid use. In the ED, pt was noted to have large ulcerative lesion R arytenoid/FVF obscuring view of R TVF but likely immobile, L nodular arytenoid mass, L TVF mobile on laryngocopy. Pt was admitted to ENT service and subsequently taken to the OR on 1/12 for direct laryngoscopy w/ biopsy and tracheostomy placement. Intraop course uncomplicated, tolerating TC. Transferred to the SICU post op for airway monitoring due to increased secretion burden.     1/12: Direct laryngoscopy w/ biopsy and tracheostomy placement    Active Problems   COPD (chronic obstructive pulmonary disease) (496) (J44.9)  Glaucoma (365.9) (H40.9)  Hard of hearing (389.9) (H91.90)  HTN (hypertension) (401.9) (I10)  Osteoarthritis (715.90) (M19.90)  Pre-op exam (V72.84) (Z01.818)  Pre-operative laboratory examination (V72.63) (Z01.812)    Surgical History  History of Cataract Surgery  History of Hernia Repair  History of Total Knee Replacement Left    Social History  Caffeine use (V49.89) (Z78.9)  Lives in [place]  Non-smoker (V49.89) (Z78.9)  Social alcohol use (V49.89) (Z78.9) (11 Jan 2024 19:40)    PAST MEDICAL & SURGICAL HISTORY:  [place] (hard of hearing)  HTN (hypertension)  Glaucoma  Deep vein thrombosis (DVT)  left knee, 25 yrs ago  Dysphagia    S/P total knee replacement, left      ROS: See HPI    MEDICATIONS:  ALLERGIES:    SOCIAL HISTORY:  Smoke: Never Smoker  EtOH: occasional    Vital Signs Last 24 Hrs  T(C): 36.5 (12 Jan 2024 18:22), Max: 36.8 (12 Jan 2024 04:53)  T(F): 97.7 (12 Jan 2024 18:22), Max: 98.3 (12 Jan 2024 04:53)  HR: 86 (12 Jan 2024 19:22) (76 - 90)  BP: 104/65 (12 Jan 2024 19:22) (99/60 - 131/76)  BP(mean): 80 (12 Jan 2024 19:22) (74 - 86)  RR: 15 (12 Jan 2024 19:22) (14 - 22)  SpO2: 98% (12 Jan 2024 19:22) (94% - 100%)    Parameters below as of 12 Jan 2024 19:22  Patient On (Oxygen Delivery Method): tracheostomy collar    O2 Concentration (%): 35    PHYSICAL EXAM    Gen: NAD  CV: RRR  Pulm: Trach sutured in place, CTAB no rhonchi,crackles  Abd: Soft, NT/ND, peg site c/d/i bumper @4  /Renal: No valdovinos in place  Neuro: AOx3, nonfocal  LABS:                        11.8   11.14 )-----------( 184      ( 12 Jan 2024 05:30 )             35.7     01-12    132<L>  |  94<L>  |  24<H>  ----------------------------<  136<H>  4.4   |  31  |  0.69    Ca    8.7      12 Jan 2024 05:30  Phos  2.9     01-12  Mg     2.0     01-12        Urinalysis Basic - ( 12 Jan 2024 05:30 )    Color: x / Appearance: x / SG: x / pH: x  Gluc: 136 mg/dL / Ketone: x  / Bili: x / Urobili: x   Blood: x / Protein: x / Nitrite: x   Leuk Esterase: x / RBC: x / WBC x   Sq Epi: x / Non Sq Epi: x / Bacteria: x      RADIOLOGY & ADDITIONAL STUDIES:    ASSESSMENT AND PLAN:  88M with history of known R sided laryngeal mass (known to Dr. 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   CV: MAP > 65  Pulm: 35% TC. nebs q6 standing,   GI: s/p PEG, resume TF  : Strict I and O. LR @60  Endo: ISS  Heme: no active issues  DVT ppx on hold until 1/13  Lines/Drains: PIV   Dispo: SICU HPI:  88M with history of HTN, COPD, glaucoma, hard of hearing, osteoarthritis, known R sided laryngeal mass (known to Dr. Crisostomo, biopsy R piriform sinus 5/2023 pos. for SCC, now s/p RT, PEG-dependent) presenting 1/11 with worsening shortness of breath for past month and new imaging findings on CT scan from one week ago in [facility] concerning for new laryngeal mass. Patient lives and receives care in [facility]. He reports increased cough and mucous production associated with intermittent episodes of worsening shortness of breath for the past month, 6 months of progressive voice hoarseness and multiple recent rounds of steroid use. In the ED, pt was noted to have large ulcerative lesion R arytenoid/FVF obscuring view of R TVF but likely immobile, L nodular arytenoid mass, L TVF mobile on laryngocopy. Pt was admitted to ENT service and subsequently taken to the OR on 1/12 for direct laryngoscopy w/ biopsy and tracheostomy placement. Intraop course uncomplicated, tolerating TC. Transferred to the SICU post op for airway monitoring due to increased secretion burden.     1/12: Direct laryngoscopy w/ biopsy and tracheostomy placement    Active Problems   COPD (chronic obstructive pulmonary disease) (496) (J44.9)  Glaucoma (365.9) (H40.9)  Hard of hearing (389.9) (H91.90)  HTN (hypertension) (401.9) (I10)  Osteoarthritis (715.90) (M19.90)  Pre-op exam (V72.84) (Z01.818)  Pre-operative laboratory examination (V72.63) (Z01.812)    Surgical History  History of Cataract Surgery  History of Hernia Repair  History of Total Knee Replacement Left    Social History  Caffeine use (V49.89) (Z78.9)  Lives in [place]  Non-smoker (V49.89) (Z78.9)  Social alcohol use (V49.89) (Z78.9) (11 Jan 2024 19:40)    PAST MEDICAL & SURGICAL HISTORY:  [place] (hard of hearing)  HTN (hypertension)  Glaucoma  Deep vein thrombosis (DVT)  left knee, 25 yrs ago  Dysphagia    S/P total knee replacement, left      ROS: See HPI    MEDICATIONS:  ALLERGIES:    SOCIAL HISTORY:  Smoke: Never Smoker  EtOH: occasional    Vital Signs Last 24 Hrs  T(C): 36.5 (12 Jan 2024 18:22), Max: 36.8 (12 Jan 2024 04:53)  T(F): 97.7 (12 Jan 2024 18:22), Max: 98.3 (12 Jan 2024 04:53)  HR: 86 (12 Jan 2024 19:22) (76 - 90)  BP: 104/65 (12 Jan 2024 19:22) (99/60 - 131/76)  BP(mean): 80 (12 Jan 2024 19:22) (74 - 86)  RR: 15 (12 Jan 2024 19:22) (14 - 22)  SpO2: 98% (12 Jan 2024 19:22) (94% - 100%)    Parameters below as of 12 Jan 2024 19:22  Patient On (Oxygen Delivery Method): tracheostomy collar    O2 Concentration (%): 35    PHYSICAL EXAM    Gen: NAD  CV: RRR  Pulm: Trach sutured in place, CTAB no rhonchi,crackles  Abd: Soft, NT/ND, peg site c/d/i bumper @4  /Renal: No valdovinos in place  Neuro: AOx3, nonfocal  LABS:                        11.8   11.14 )-----------( 184      ( 12 Jan 2024 05:30 )             35.7     01-12    132<L>  |  94<L>  |  24<H>  ----------------------------<  136<H>  4.4   |  31  |  0.69    Ca    8.7      12 Jan 2024 05:30  Phos  2.9     01-12  Mg     2.0     01-12        Urinalysis Basic - ( 12 Jan 2024 05:30 )    Color: x / Appearance: x / SG: x / pH: x  Gluc: 136 mg/dL / Ketone: x  / Bili: x / Urobili: x   Blood: x / Protein: x / Nitrite: x   Leuk Esterase: x / RBC: x / WBC x   Sq Epi: x / Non Sq Epi: x / Bacteria: x      RADIOLOGY & ADDITIONAL STUDIES:    ASSESSMENT AND PLAN:  88M with history of known R sided laryngeal mass (known to Dr. Crisostomo, biopsy R piriform sinus 5/2023 pos. for SCC in May 23, now s/p RT, PEG-dependent) presenting from [facility] with worsening shortness of breath, increased secretion and [facility] hoarseness[facility], FOE with large ulcerative lesion R arytenoid/FVF obscuring view of R TVF but likely immobile, L nodular arytenoid mass, L TVF mobile. 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   CV: MAP > 65  Pulm: 35% TC. nebs q6 prn,   GI: s/p PEG, resume TF  : Strict I and O. LR @60  Endo: ISS  Heme: no active issues  DVT ppx on hold until 1/13  Lines/Drains: PIV   Dispo: SICU HPI:  88M with history of HTN, COPD, glaucoma, hard of hearing, osteoarthritis, known R sided laryngeal mass (known to Dr. Crisostomo, biopsy R piriform sinus 5/2023 pos. for SCC, now s/p RT, PEG-dependent) presenting 1/11 with worsening shortness of breath for past month and new imaging findings on CT scan from one week ago in [facility] concerning for new laryngeal mass. Patient lives and receives care in [facility]. He reports increased cough and mucous production associated with intermittent episodes of worsening shortness of breath for the past month, 6 months of progressive voice hoarseness and multiple recent rounds of steroid use. In the ED, pt was noted to have large ulcerative lesion R arytenoid/FVF obscuring view of R TVF but likely immobile, L nodular arytenoid mass, L TVF mobile on laryngocopy. Pt was admitted to ENT service and subsequently taken to the OR on 1/12 for direct laryngoscopy w/ biopsy and tracheostomy placement. Intraop course uncomplicated, tolerating TC. Transferred to the SICU post op for airway monitoring due to increased secretion burden.     1/12: Direct laryngoscopy w/ biopsy and tracheostomy placement    Active Problems   COPD (chronic obstructive pulmonary disease) (496) (J44.9)  Glaucoma (365.9) (H40.9)  Hard of hearing (389.9) (H91.90)  HTN (hypertension) (401.9) (I10)  Osteoarthritis (715.90) (M19.90)  Pre-op exam (V72.84) (Z01.818)  Pre-operative laboratory examination (V72.63) (Z01.812)    Surgical History  History of Cataract Surgery  History of Hernia Repair  History of Total Knee Replacement Left    Social History  Caffeine use (V49.89) (Z78.9)  Lives in [place]  Non-smoker (V49.89) (Z78.9)  Social alcohol use (V49.89) (Z78.9) (11 Jan 2024 19:40)    PAST MEDICAL & SURGICAL HISTORY:  [place] (hard of hearing)  HTN (hypertension)  Glaucoma  Deep vein thrombosis (DVT)  left knee, 25 yrs ago  Dysphagia    S/P total knee replacement, left      ROS: See HPI    MEDICATIONS:  ALLERGIES:    SOCIAL HISTORY:  Smoke: Never Smoker  EtOH: occasional    Vital Signs Last 24 Hrs  T(C): 36.5 (12 Jan 2024 18:22), Max: 36.8 (12 Jan 2024 04:53)  T(F): 97.7 (12 Jan 2024 18:22), Max: 98.3 (12 Jan 2024 04:53)  HR: 86 (12 Jan 2024 19:22) (76 - 90)  BP: 104/65 (12 Jan 2024 19:22) (99/60 - 131/76)  BP(mean): 80 (12 Jan 2024 19:22) (74 - 86)  RR: 15 (12 Jan 2024 19:22) (14 - 22)  SpO2: 98% (12 Jan 2024 19:22) (94% - 100%)    Parameters below as of 12 Jan 2024 19:22  Patient On (Oxygen Delivery Method): tracheostomy collar    O2 Concentration (%): 35    PHYSICAL EXAM    Gen: NAD  CV: RRR  Pulm: Trach sutured in place, CTAB no rhonchi,crackles  Abd: Soft, NT/ND, peg site c/d/i bumper @4  /Renal: No valdovinos in place  Neuro: AOx3, nonfocal  LABS:                        11.8   11.14 )-----------( 184      ( 12 Jan 2024 05:30 )             35.7     01-12    132<L>  |  94<L>  |  24<H>  ----------------------------<  136<H>  4.4   |  31  |  0.69    Ca    8.7      12 Jan 2024 05:30  Phos  2.9     01-12  Mg     2.0     01-12        Urinalysis Basic - ( 12 Jan 2024 05:30 )    Color: x / Appearance: x / SG: x / pH: x  Gluc: 136 mg/dL / Ketone: x  / Bili: x / Urobili: x   Blood: x / Protein: x / Nitrite: x   Leuk Esterase: x / RBC: x / WBC x   Sq Epi: x / Non Sq Epi: x / Bacteria: x      RADIOLOGY & ADDITIONAL STUDIES:    ASSESSMENT AND PLAN:  88M with history of known R sided laryngeal mass (known to Dr. Crisostomo, biopsy R piriform sinus 5/2023 pos. for SCC in May 23, now s/p RT, PEG-dependent) presenting from [facility] with worsening shortness of breath, increased secretion and [facility] ho[facility], FOE with large ulcerative lesion R arytenoid/FVF obscuring view of R TVF but likely immobile, L nodular arytenoid mass, L TVF mobile. 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   CV: MAP > 65  Pulm: 35% TC. nebs q6 prn,   GI: s/p PEG, resume TF  : Strict I and O. LR @60  Endo: ISS  Heme: no active issues  DVT ppx on hold until 1/13  Lines/Drains: PIV   Dispo: SICU

## 2024-01-12 NOTE — PROGRESS NOTE ADULT - ASSESSMENT
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 Crisostomo, biopsy R piriform sinus 5/2023 pos. for SCC, now s/p RT, PEG-dependent) presenting with worsening shortness of breath for past month and new imaging findings on CT scan from one week ago in [facility] concerning for new laryngeal mass. FOE: large ulcerative lesion R arytenoid/FVF obscuring view of R TVF but likely immobile, L nodular arytenoid mass, L TVF mobile. However, patient in no acute distress, non-stridulous, breathing comfortably on RA.    Plan:  - Preop labs for 1/12  - Home meds  - Will obtain consent for OR 1/12  - NPO  - Added on for DLB / trach likely 1/12  - Fu CT neck   88M with history of known R sided laryngeal mass (known to Dr. Crisostomo, biopsy R piriform sinus 5/2023 pos. for SCC, now s/p RT, PEG-dependent) presenting with worsening shortness of breath for past month and new imaging findings on CT scan from one week ago in [facility] concerning for new laryngeal mass. FOE: large ulcerative lesion R arytenoid/FVF obscuring view of R TVF but likely immobile, L nodular arytenoid mass, L TVF mobile. However, patient in no acute distress, non-stridulous, breathing comfortably on RA.    Plan:  - Preop labs for 1/12  - Home meds  - Will obtain consent for OR 1/12  - NPO  - Added on for DLB / trach likely 1/12  - Fu CT neck   88M with history of known R sided laryngeal mass (known to Dr. Crisostomo, biopsy R piriform sinus 5/2023 pos. for SCC, now s/p RT, PEG-dependent) presenting with worsening shortness of breath for past month and new imaging findings on CT scan from one week ago in [facility] concerning for new laryngeal mass. FOE: large ulcerative lesion R arytenoid/FVF obscuring view of R TVF but likely immobile, L nodular arytenoid mass, L TVF mobile. However, patient in no acute distress, non-stridulous, breathing comfortably on RA.    Plan:  - Preop labs for 1/12  - Home meds  - Will obtain consent for OR 1/12  - NPO  - Added on for DLB / trach likely 1/12  - Fu CT neck

## 2024-01-12 NOTE — CONSULT NOTE ADULT - NS ATTEND AMEND GEN_ALL_CORE FT
Mukund Couch  [id number]  SICU post op  Mr. Couch is an 89 y/o male with COPD, HTN and a laryngeal SCC s/p RT,  dysphagia with PEG tube now s/p tracheostomy placement. The patient was evaluated in the PACU - he is in NAD, VSS, (+)tracheostomy with some mucus discharge via tracheostomy, no bleeding, following commands.  -Laryngeal SCC s/p tracheostomy with mucus discharge requiring frequent suctioning  -s/p tracheostomy  -COPD  -malnutrition  >pain control with Tylenol  >moisturise O2 to maintain the SO2 in the low 90s  >CXR  >SCD and Lovenox when ok with surgery  Please see above for the rest of the details as d/w the house staff. Mukund Couch  [id number]  SICU post op  Mr. Couch is an 89 y/o male with COPD, HTN and a laryngeal SCC s/p RT,  dysphagia with PEG tube now s/p tracheostomy placement. The patient was evaluated in the PACU - he is in NAD, VSS, (+)tracheostomy with some mucus discharge via tracheostomy, no bleeding, following commands.  -Laryngeal SCC s/p tracheostomy with mucus discharge requiring frequent suctioning  -s/p tracheostomy  -COPD  -malnutrition  >pain control with Tylenol  >moisturise O2 to maintain the SO2 in the low 90s  >CXR  >SCD and Lovenox when ok with surgery  Please see above for the rest of the details as d/w the house staff.

## 2024-01-13 LAB
ANION GAP SERPL CALC-SCNC: 8 MMOL/L — SIGNIFICANT CHANGE UP (ref 5–17)
ANION GAP SERPL CALC-SCNC: 8 MMOL/L — SIGNIFICANT CHANGE UP (ref 5–17)
BUN SERPL-MCNC: 27 MG/DL — HIGH (ref 7–23)
BUN SERPL-MCNC: 27 MG/DL — HIGH (ref 7–23)
CALCIUM SERPL-MCNC: 8.8 MG/DL — SIGNIFICANT CHANGE UP (ref 8.4–10.5)
CALCIUM SERPL-MCNC: 8.8 MG/DL — SIGNIFICANT CHANGE UP (ref 8.4–10.5)
CHLORIDE SERPL-SCNC: 98 MMOL/L — SIGNIFICANT CHANGE UP (ref 96–108)
CHLORIDE SERPL-SCNC: 98 MMOL/L — SIGNIFICANT CHANGE UP (ref 96–108)
CO2 SERPL-SCNC: 30 MMOL/L — SIGNIFICANT CHANGE UP (ref 22–31)
CO2 SERPL-SCNC: 30 MMOL/L — SIGNIFICANT CHANGE UP (ref 22–31)
CREAT SERPL-MCNC: 0.76 MG/DL — SIGNIFICANT CHANGE UP (ref 0.5–1.3)
CREAT SERPL-MCNC: 0.76 MG/DL — SIGNIFICANT CHANGE UP (ref 0.5–1.3)
EGFR: 86 ML/MIN/1.73M2 — SIGNIFICANT CHANGE UP
EGFR: 86 ML/MIN/1.73M2 — SIGNIFICANT CHANGE UP
GLUCOSE BLDC GLUCOMTR-MCNC: 198 MG/DL — HIGH (ref 70–99)
GLUCOSE BLDC GLUCOMTR-MCNC: 198 MG/DL — HIGH (ref 70–99)
GLUCOSE BLDC GLUCOMTR-MCNC: 205 MG/DL — HIGH (ref 70–99)
GLUCOSE BLDC GLUCOMTR-MCNC: 205 MG/DL — HIGH (ref 70–99)
GLUCOSE BLDC GLUCOMTR-MCNC: 86 MG/DL — SIGNIFICANT CHANGE UP (ref 70–99)
GLUCOSE BLDC GLUCOMTR-MCNC: 86 MG/DL — SIGNIFICANT CHANGE UP (ref 70–99)
GLUCOSE BLDC GLUCOMTR-MCNC: 98 MG/DL — SIGNIFICANT CHANGE UP (ref 70–99)
GLUCOSE BLDC GLUCOMTR-MCNC: 98 MG/DL — SIGNIFICANT CHANGE UP (ref 70–99)
GLUCOSE SERPL-MCNC: 208 MG/DL — HIGH (ref 70–99)
GLUCOSE SERPL-MCNC: 208 MG/DL — HIGH (ref 70–99)
HCT VFR BLD CALC: 34.9 % — LOW (ref 39–50)
HCT VFR BLD CALC: 34.9 % — LOW (ref 39–50)
HGB BLD-MCNC: 11.3 G/DL — LOW (ref 13–17)
HGB BLD-MCNC: 11.3 G/DL — LOW (ref 13–17)
MAGNESIUM SERPL-MCNC: 2 MG/DL — SIGNIFICANT CHANGE UP (ref 1.6–2.6)
MAGNESIUM SERPL-MCNC: 2 MG/DL — SIGNIFICANT CHANGE UP (ref 1.6–2.6)
MCHC RBC-ENTMCNC: 31.6 PG — SIGNIFICANT CHANGE UP (ref 27–34)
MCHC RBC-ENTMCNC: 31.6 PG — SIGNIFICANT CHANGE UP (ref 27–34)
MCHC RBC-ENTMCNC: 32.4 GM/DL — SIGNIFICANT CHANGE UP (ref 32–36)
MCHC RBC-ENTMCNC: 32.4 GM/DL — SIGNIFICANT CHANGE UP (ref 32–36)
MCV RBC AUTO: 97.5 FL — SIGNIFICANT CHANGE UP (ref 80–100)
MCV RBC AUTO: 97.5 FL — SIGNIFICANT CHANGE UP (ref 80–100)
NRBC # BLD: 0 /100 WBCS — SIGNIFICANT CHANGE UP (ref 0–0)
NRBC # BLD: 0 /100 WBCS — SIGNIFICANT CHANGE UP (ref 0–0)
PHOSPHATE SERPL-MCNC: 3 MG/DL — SIGNIFICANT CHANGE UP (ref 2.5–4.5)
PHOSPHATE SERPL-MCNC: 3 MG/DL — SIGNIFICANT CHANGE UP (ref 2.5–4.5)
PLATELET # BLD AUTO: 212 K/UL — SIGNIFICANT CHANGE UP (ref 150–400)
PLATELET # BLD AUTO: 212 K/UL — SIGNIFICANT CHANGE UP (ref 150–400)
POTASSIUM SERPL-MCNC: 4.6 MMOL/L — SIGNIFICANT CHANGE UP (ref 3.5–5.3)
POTASSIUM SERPL-MCNC: 4.6 MMOL/L — SIGNIFICANT CHANGE UP (ref 3.5–5.3)
POTASSIUM SERPL-SCNC: 4.6 MMOL/L — SIGNIFICANT CHANGE UP (ref 3.5–5.3)
POTASSIUM SERPL-SCNC: 4.6 MMOL/L — SIGNIFICANT CHANGE UP (ref 3.5–5.3)
RBC # BLD: 3.58 M/UL — LOW (ref 4.2–5.8)
RBC # BLD: 3.58 M/UL — LOW (ref 4.2–5.8)
RBC # FLD: 13 % — SIGNIFICANT CHANGE UP (ref 10.3–14.5)
RBC # FLD: 13 % — SIGNIFICANT CHANGE UP (ref 10.3–14.5)
SODIUM SERPL-SCNC: 136 MMOL/L — SIGNIFICANT CHANGE UP (ref 135–145)
SODIUM SERPL-SCNC: 136 MMOL/L — SIGNIFICANT CHANGE UP (ref 135–145)
WBC # BLD: 7.94 K/UL — SIGNIFICANT CHANGE UP (ref 3.8–10.5)
WBC # BLD: 7.94 K/UL — SIGNIFICANT CHANGE UP (ref 3.8–10.5)
WBC # FLD AUTO: 7.94 K/UL — SIGNIFICANT CHANGE UP (ref 3.8–10.5)
WBC # FLD AUTO: 7.94 K/UL — SIGNIFICANT CHANGE UP (ref 3.8–10.5)

## 2024-01-13 PROCEDURE — 99232 SBSQ HOSP IP/OBS MODERATE 35: CPT | Mod: GC

## 2024-01-13 PROCEDURE — 70491 CT SOFT TISSUE NECK W/DYE: CPT | Mod: 26

## 2024-01-13 PROCEDURE — 71260 CT THORAX DX C+: CPT | Mod: 26

## 2024-01-13 PROCEDURE — 71045 X-RAY EXAM CHEST 1 VIEW: CPT | Mod: 26

## 2024-01-13 RX ORDER — TIOTROPIUM BROMIDE 18 UG/1
2 CAPSULE ORAL; RESPIRATORY (INHALATION) DAILY
Refills: 0 | Status: DISCONTINUED | OUTPATIENT
Start: 2024-01-13 | End: 2024-01-13

## 2024-01-13 RX ORDER — OXYCODONE HYDROCHLORIDE 5 MG/1
2.5 TABLET ORAL EVERY 6 HOURS
Refills: 0 | Status: DISCONTINUED | OUTPATIENT
Start: 2024-01-13 | End: 2024-01-13

## 2024-01-13 RX ORDER — IPRATROPIUM/ALBUTEROL SULFATE 18-103MCG
3 AEROSOL WITH ADAPTER (GRAM) INHALATION ONCE
Refills: 0 | Status: COMPLETED | OUTPATIENT
Start: 2024-01-13 | End: 2024-01-13

## 2024-01-13 RX ORDER — GLUCAGON INJECTION, SOLUTION 0.5 MG/.1ML
1 INJECTION, SOLUTION SUBCUTANEOUS ONCE
Refills: 0 | Status: DISCONTINUED | OUTPATIENT
Start: 2024-01-13 | End: 2024-02-01

## 2024-01-13 RX ORDER — OXYCODONE HYDROCHLORIDE 5 MG/1
5 TABLET ORAL EVERY 6 HOURS
Refills: 0 | Status: DISCONTINUED | OUTPATIENT
Start: 2024-01-13 | End: 2024-01-13

## 2024-01-13 RX ORDER — INSULIN LISPRO 100/ML
1 VIAL (ML) SUBCUTANEOUS ONCE
Refills: 0 | Status: COMPLETED | OUTPATIENT
Start: 2024-01-13 | End: 2024-01-13

## 2024-01-13 RX ORDER — IPRATROPIUM BROMIDE 0.2 MG/ML
500 SOLUTION, NON-ORAL INHALATION ONCE
Refills: 0 | Status: COMPLETED | OUTPATIENT
Start: 2024-01-13 | End: 2024-01-13

## 2024-01-13 RX ORDER — DEXTROSE 50 % IN WATER 50 %
15 SYRINGE (ML) INTRAVENOUS ONCE
Refills: 0 | Status: DISCONTINUED | OUTPATIENT
Start: 2024-01-13 | End: 2024-02-01

## 2024-01-13 RX ORDER — INSULIN LISPRO 100/ML
VIAL (ML) SUBCUTANEOUS EVERY 6 HOURS
Refills: 0 | Status: DISCONTINUED | OUTPATIENT
Start: 2024-01-13 | End: 2024-02-01

## 2024-01-13 RX ORDER — DOXAZOSIN MESYLATE 4 MG
1 TABLET ORAL AT BEDTIME
Refills: 0 | Status: DISCONTINUED | OUTPATIENT
Start: 2024-01-13 | End: 2024-01-18

## 2024-01-13 RX ORDER — ENOXAPARIN SODIUM 100 MG/ML
40 INJECTION SUBCUTANEOUS EVERY 24 HOURS
Refills: 0 | Status: DISCONTINUED | OUTPATIENT
Start: 2024-01-13 | End: 2024-02-01

## 2024-01-13 RX ORDER — DEXTROSE 50 % IN WATER 50 %
25 SYRINGE (ML) INTRAVENOUS ONCE
Refills: 0 | Status: DISCONTINUED | OUTPATIENT
Start: 2024-01-13 | End: 2024-02-01

## 2024-01-13 RX ORDER — POLYETHYLENE GLYCOL 3350 17 G/17G
17 POWDER, FOR SOLUTION ORAL EVERY 24 HOURS
Refills: 0 | Status: DISCONTINUED | OUTPATIENT
Start: 2024-01-13 | End: 2024-01-20

## 2024-01-13 RX ORDER — DEXTROSE 50 % IN WATER 50 %
12.5 SYRINGE (ML) INTRAVENOUS ONCE
Refills: 0 | Status: DISCONTINUED | OUTPATIENT
Start: 2024-01-13 | End: 2024-02-01

## 2024-01-13 RX ORDER — SODIUM CHLORIDE 9 MG/ML
1000 INJECTION, SOLUTION INTRAVENOUS
Refills: 0 | Status: DISCONTINUED | OUTPATIENT
Start: 2024-01-13 | End: 2024-02-01

## 2024-01-13 RX ORDER — SODIUM CHLORIDE 9 MG/ML
500 INJECTION, SOLUTION INTRAVENOUS ONCE
Refills: 0 | Status: COMPLETED | OUTPATIENT
Start: 2024-01-13 | End: 2024-01-13

## 2024-01-13 RX ORDER — DOXAZOSIN MESYLATE 4 MG
1 TABLET ORAL ONCE
Refills: 0 | Status: COMPLETED | OUTPATIENT
Start: 2024-01-13 | End: 2024-01-13

## 2024-01-13 RX ADMIN — Medication 2: at 06:16

## 2024-01-13 RX ADMIN — Medication 3 MILLILITER(S): at 19:30

## 2024-01-13 RX ADMIN — Medication 500 MICROGRAM(S): at 11:13

## 2024-01-13 RX ADMIN — ENOXAPARIN SODIUM 40 MILLIGRAM(S): 100 INJECTION SUBCUTANEOUS at 12:43

## 2024-01-13 RX ADMIN — Medication 1 UNIT(S): at 01:02

## 2024-01-13 RX ADMIN — CHLORHEXIDINE GLUCONATE 1 APPLICATION(S): 213 SOLUTION TOPICAL at 06:31

## 2024-01-13 RX ADMIN — POLYETHYLENE GLYCOL 3350 17 GRAM(S): 17 POWDER, FOR SOLUTION ORAL at 12:43

## 2024-01-13 RX ADMIN — Medication 1 MILLIGRAM(S): at 18:13

## 2024-01-13 RX ADMIN — SODIUM CHLORIDE 60 MILLILITER(S): 9 INJECTION, SOLUTION INTRAVENOUS at 01:04

## 2024-01-13 RX ADMIN — SODIUM CHLORIDE 500 MILLILITER(S): 9 INJECTION, SOLUTION INTRAVENOUS at 01:03

## 2024-01-13 NOTE — PROGRESS NOTE ADULT - NS ATTEND AMEND GEN_ALL_CORE FT
HTN now with laryngeal mass and airway obstruction s/p elective tracheostomy  physical as above  start PEG feed  tolerating trach collar  s/p mass biopsy  lovenox  rest as above

## 2024-01-13 NOTE — DIETITIAN INITIAL EVALUATION ADULT - NSFNSGIIOFT_GEN_A_CORE
01-12-24 @ 07:01  -  01-13-24 @ 07:00  --------------------------------------------------------  OUT:  Total OUT: 0 mL    Total NET: 600 mL

## 2024-01-13 NOTE — DIETITIAN NUTRITION RISK NOTIFICATION - TREATMENT: THE FOLLOWING DIET HAS BEEN RECOMMENDED
Diet, NPO with Tube Feed:   Tube Feeding Modality: Gastrostomy  Jevity 1.2 Phil (JEVITY1.2RTH)  Total Volume for 24 Hours (mL): 1200  Total Number of Cans: 5  Bolus  Total Volume of Bolus (mL):  300  Tube Feed Frequency: Every 6 hours   Tube Feed Start Time: 19:00  Bolus Feed Rate (mL per Hour): 300   Bolus Feed Duration (in Hours): 0  Free Water Flush  Bolus   Total Volume per Flush (mL): 50   Frequency: Every 6 Hours (01-12-24 @ 21:52) [Active]

## 2024-01-13 NOTE — DIETITIAN INITIAL EVALUATION ADULT - PERTINENT MEDS FT
MEDICATIONS  (STANDING):  chlorhexidine 4% Liquid 1 Application(s) Topical <User Schedule>  dextrose 5%. 1000 milliLiter(s) (50 mL/Hr) IV Continuous <Continuous>  dextrose 5%. 1000 milliLiter(s) (100 mL/Hr) IV Continuous <Continuous>  dextrose 50% Injectable 25 Gram(s) IV Push once  dextrose 50% Injectable 12.5 Gram(s) IV Push once  dextrose 50% Injectable 25 Gram(s) IV Push once  enoxaparin Injectable 40 milliGRAM(s) SubCutaneous every 24 hours  glucagon  Injectable 1 milliGRAM(s) IntraMuscular once  influenza  Vaccine (HIGH DOSE) 0.7 milliLiter(s) IntraMuscular once  insulin lispro (ADMELOG) corrective regimen sliding scale   SubCutaneous every 6 hours  polyethylene glycol 3350 17 Gram(s) Oral every 24 hours  tiotropium 2.5 MICROgram(s) Inhaler 2 Puff(s) Inhalation daily    MEDICATIONS  (PRN):  acetaminophen   Oral Liquid .. 650 milliGRAM(s) Enteral Tube every 6 hours PRN Mild Pain (1 - 3)  albuterol    90 MICROgram(s) HFA Inhaler 2 Puff(s) Inhalation every 6 hours PRN Shortness of Breath  dextrose Oral Gel 15 Gram(s) Oral once PRN Blood Glucose LESS THAN 70 milliGRAM(s)/deciliter  ondansetron Injectable 4 milliGRAM(s) IV Push every 6 hours PRN Nausea  oxyCODONE    IR 2.5 milliGRAM(s) Oral every 6 hours PRN Moderate Pain (4 - 6)  oxyCODONE    IR 5 milliGRAM(s) Oral every 6 hours PRN Severe Pain (7 - 10)

## 2024-01-13 NOTE — PROGRESS NOTE ADULT - ASSESSMENT
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 Crisostomo, biopsy R piriform sinus 5/2023 pos. for SCC, now s/p RT, PEG-dependent) presenting with worsening shortness of breath for past month and new imaging findings on CT scan from one week ago in [facility] concerning for new laryngeal mass. FOE: large ulcerative lesion R arytenoid/FVF obscuring view of R TVF but likely immobile, L nodular arytenoid mass, L TVF mobile. However, patient in no acute distress, non-stridulous, breathing comfortably on RA.    Plan:  - CT neck today  - Tube feeds at goal  - Cuff deflated  - Recommend nebulizer to help with thick mucous  - Remainder per SICU team   88M with history of known R sided laryngeal mass (known to Dr. 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 FOE: large ulcerative lesion R arytenoid/FVF obscuring view of R TVF but likely immobile, L nodular arytenoid mass, L TVF mobile. However, patient in no acute distress, non-stridulous, breathing comfortably on RA.    Plan:  - CT neck today  - Tube feeds at goal  - Cuff deflated  - Recommend nebulizer to help with thick mucous  - Remainder per SICU team   88M with history of known R sided laryngeal mass (known to Dr. Crisostomo, biopsy R piriform sinus 5/2023 pos. for SCC, now s/p RT, PEG-dependent) presenting with worsening shortness of breath for past month and new imaging findings on CT scan from one week ago in [facility] concerning for new laryngeal mass. FOE: large ulcerative lesion R arytenoid/FVF obscuring view of R TVF but likely immobile, L nodular arytenoid mass, L TVF mobile. However, patient in no acute distress, non-stridulous, breathing comfortably on RA.    Plan:  - CT neck today  - Tube feeds at goal  - Cuff deflated  - Recommend nebulizer to help with thick mucous  - Remainder per SICU team

## 2024-01-13 NOTE — PROGRESS NOTE ADULT - SUBJECTIVE AND OBJECTIVE BOX
INTERVAL/OVERNIGHT EVENTS: Transferred to SICU post op with new tracheostomy, on trach collar all night, required IVF bolus 500cc for SBP 80s, with improvement in vitals. Failed TOV and required straight cath x1 at 4am.     SUBJECTIVE: This AM doing well without complaints. No N/V or CP/SOB. Passing flatus, no BMs.     Neurologic Medications  acetaminophen   Oral Liquid .. 650 milliGRAM(s) Enteral Tube every 6 hours PRN Mild Pain (1 - 3)  ondansetron Injectable 4 milliGRAM(s) IV Push every 6 hours PRN Nausea  oxyCODONE    IR 2.5 milliGRAM(s) Oral every 6 hours PRN Moderate Pain (4 - 6)  oxyCODONE    IR 5 milliGRAM(s) Oral every 6 hours PRN Severe Pain (7 - 10)    Respiratory Medications  albuterol    90 MICROgram(s) HFA Inhaler 2 Puff(s) Inhalation every 6 hours PRN Shortness of Breath  ipratropium  for Nebulization. 500 MICROGram(s) Nebulizer once  tiotropium 2.5 MICROgram(s) Inhaler 2 Puff(s) Inhalation daily    Hematologic/Oncologic Medications  enoxaparin Injectable 40 milliGRAM(s) SubCutaneous every 24 hours  influenza  Vaccine (HIGH DOSE) 0.7 milliLiter(s) IntraMuscular once    Endocrine/Metabolic Medications  insulin lispro (ADMELOG) corrective regimen sliding scale   SubCutaneous every 6 hours    Topical/Other Medications  chlorhexidine 4% Liquid 1 Application(s) Topical <User Schedule>    MEDICATIONS  (PRN):  acetaminophen   Oral Liquid .. 650 milliGRAM(s) Enteral Tube every 6 hours PRN Mild Pain (1 - 3)  albuterol    90 MICROgram(s) HFA Inhaler 2 Puff(s) Inhalation every 6 hours PRN Shortness of Breath  ondansetron Injectable 4 milliGRAM(s) IV Push every 6 hours PRN Nausea  oxyCODONE    IR 2.5 milliGRAM(s) Oral every 6 hours PRN Moderate Pain (4 - 6)  oxyCODONE    IR 5 milliGRAM(s) Oral every 6 hours PRN Severe Pain (7 - 10)    I&O's Detail    12 Jan 2024 07:01  -  13 Jan 2024 07:00  --------------------------------------------------------  IN:    Enteral Tube Flush: 100 mL    Jevity 1.2: 600 mL    Lactated Ringers: 840 mL    Lactated Ringers Bolus: 500 mL  Total IN: 2040 mL    OUT:    Intermittent Catheterization - Urethral (mL): 600 mL  Total OUT: 600 mL    Total NET: 1440 mL    13 Jan 2024 07:01  -  13 Jan 2024 09:52  --------------------------------------------------------  IN:    Lactated Ringers: 60 mL  Total IN: 60 mL    OUT:  Total OUT: 0 mL    Total NET: 60 mL    Vital Signs Last 24 Hrs  T(C): 36.8 (13 Jan 2024 08:30), Max: 36.8 (12 Jan 2024 14:18)  T(F): 98.3 (13 Jan 2024 08:30), Max: 98.3 (13 Jan 2024 08:30)  HR: 70 (13 Jan 2024 08:50) (70 - 90)  BP: 110/63 (13 Jan 2024 08:00) (80/51 - 123/59)  BP(mean): 82 (13 Jan 2024 08:00) (61 - 86)  RR: 16 (13 Jan 2024 08:50) (10 - 22)  SpO2: 98% (13 Jan 2024 08:50) (95% - 100%)    Parameters below as of 13 Jan 2024 08:50  Patient On (Oxygen Delivery Method): tracheostomy collar  O2 Flow (L/min): 10  O2 Concentration (%): 35    GENERAL: NAD, resting comfortably in chair, AxOx3, writes to communicate  HEENT: NCAT, MMM, new [facility] site CDI  C/V: Normal rate, normal peripheral perfusion, no murmurs  PULM: Nonlabored breathing, no respiratory distress, CTA bl  ABD: Soft, ND, NT, no rebound tenderness, no guarding  EXTREM: WWP, no edema, SCDs in place  NEURO: No focal deficits    LABS:                        11.3   7.94  )-----------( 212      ( 13 Jan 2024 05:30 )             34.9     01-13    136  |  98  |  27<H>  ----------------------------<  208<H>  4.6   |  30  |  0.76    Ca    8.8      13 Jan 2024 05:30  Phos  3.0     01-13  Mg     2.0     01-13    Urinalysis Basic - ( 13 Jan 2024 05:30 )    Color: x / Appearance: x / SG: x / pH: x  Gluc: 208 mg/dL / Ketone: x  / Bili: x / Urobili: x   Blood: x / Protein: x / Nitrite: x   Leuk Esterase: x / RBC: x / WBC x   Sq Epi: x / Non Sq Epi: x / Bacteria: x   INTERVAL/OVERNIGHT EVENTS: Transferred to SICU post op with new tracheostomy, on trach collar all night, required IVF bolus 500cc for SBP 80s, with improvement in vitals. Failed TOV and required straight cath x1 at 4am.     SUBJECTIVE: This AM doing well without complaints. No N/V or CP/SOB. Passing flatus, no BMs.     Neurologic Medications  acetaminophen   Oral Liquid .. 650 milliGRAM(s) Enteral Tube every 6 hours PRN Mild Pain (1 - 3)  ondansetron Injectable 4 milliGRAM(s) IV Push every 6 hours PRN Nausea  oxyCODONE    IR 2.5 milliGRAM(s) Oral every 6 hours PRN Moderate Pain (4 - 6)  oxyCODONE    IR 5 milliGRAM(s) Oral every 6 hours PRN Severe Pain (7 - 10)    Respiratory Medications  albuterol    90 MICROgram(s) HFA Inhaler 2 Puff(s) Inhalation every 6 hours PRN Shortness of Breath  ipratropium  for Nebulization. 500 MICROGram(s) Nebulizer once  tiotropium 2.5 MICROgram(s) Inhaler 2 Puff(s) Inhalation daily    Hematologic/Oncologic Medications  enoxaparin Injectable 40 milliGRAM(s) SubCutaneous every 24 hours  influenza  Vaccine (HIGH DOSE) 0.7 milliLiter(s) IntraMuscular once    Endocrine/Metabolic Medications  insulin lispro (ADMELOG) corrective regimen sliding scale   SubCutaneous every 6 hours    Topical/Other Medications  chlorhexidine 4% Liquid 1 Application(s) Topical <User Schedule>    MEDICATIONS  (PRN):  acetaminophen   Oral Liquid .. 650 milliGRAM(s) Enteral Tube every 6 hours PRN Mild Pain (1 - 3)  albuterol    90 MICROgram(s) HFA Inhaler 2 Puff(s) Inhalation every 6 hours PRN Shortness of Breath  ondansetron Injectable 4 milliGRAM(s) IV Push every 6 hours PRN Nausea  oxyCODONE    IR 2.5 milliGRAM(s) Oral every 6 hours PRN Moderate Pain (4 - 6)  oxyCODONE    IR 5 milliGRAM(s) Oral every 6 hours PRN Severe Pain (7 - 10)    I&O's Detail    12 Jan 2024 07:01  -  13 Jan 2024 07:00  --------------------------------------------------------  IN:    Enteral Tube Flush: 100 mL    Jevity 1.2: 600 mL    Lactated Ringers: 840 mL    Lactated Ringers Bolus: 500 mL  Total IN: 2040 mL    OUT:    Intermittent Catheterization - Urethral (mL): 600 mL  Total OUT: 600 mL    Total NET: 1440 mL    13 Jan 2024 07:01  -  13 Jan 2024 09:52  --------------------------------------------------------  IN:    Lactated Ringers: 60 mL  Total IN: 60 mL    OUT:  Total OUT: 0 mL    Total NET: 60 mL    Vital Signs Last 24 Hrs  T(C): 36.8 (13 Jan 2024 08:30), Max: 36.8 (12 Jan 2024 14:18)  T(F): 98.3 (13 Jan 2024 08:30), Max: 98.3 (13 Jan 2024 08:30)  HR: 70 (13 Jan 2024 08:50) (70 - 90)  BP: 110/63 (13 Jan 2024 08:00) (80/51 - 123/59)  BP(mean): 82 (13 Jan 2024 08:00) (61 - 86)  RR: 16 (13 Jan 2024 08:50) (10 - 22)  SpO2: 98% (13 Jan 2024 08:50) (95% - 100%)    Parameters below as of 13 Jan 2024 08:50  Patient On (Oxygen Delivery Method): tracheostomy collar  O2 Flow (L/min): 10  O2 Concentration (%): 35    GENERAL: NAD, resting comfortably in chair, AxOx3, writes to communicate  HEENT: NCAT, MMM, new [facility] site CDI  C/V: Normal rate, normal peripheral perfusion, no murmurs  PULM: Nonlabored breathing, no respiratory distress, CTA bl  ABD: Soft, ND, NT, no rebound tenderness, no guarding  EXTREM: WWP, no edema, SCDs in place  NEURO: No focal deficits    LABS:                        11.3   7.94  )-----------( 212      ( 13 Jan 2024 05:30 )             34.9     01-13    136  |  98  |  27<H>  ----------------------------<  208<H>  4.6   |  30  |  0.76    Ca    8.8      13 Jan 2024 05:30  Phos  3.0     01-13  Mg     2.0     01-13    Urinalysis Basic - ( 13 Jan 2024 05:30 )    Color: x / Appearance: x / SG: x / pH: x  Gluc: 208 mg/dL / Ketone: x  / Bili: x / Urobili: x   Blood: x / Protein: x / Nitrite: x   Leuk Esterase: x / RBC: x / WBC x   Sq Epi: x / Non Sq Epi: x / Bacteria: x

## 2024-01-13 NOTE — DIETITIAN INITIAL EVALUATION ADULT - OTHER CALCULATIONS
Based on Standards of Care pt below % IBW (71%) thus actual body weight used for all calculations. Needs adjusted for advanced age, critical status, and malnutrition.

## 2024-01-13 NOTE — DIETITIAN INITIAL EVALUATION ADULT - ADD RECOMMEND
1. Recommend to change tube feeds via PEG to bolus feeds of Jevity 1.2, 6 cans/day to provide 1440mL TV, 1728 kcal, 80 g protein, 1162mL free water. Meetin kcal/kg, 1.5 g/kg protein based on ABW 51.7 kg. Defer fluids to team. Maintain aspiration precautions. Monitor s/s of intolerance; adjust EN as needed.   >Sample bolus schedule: 480mL @8am, 240mL @12pm, 240mL @4pm, 480mL @8pm  2. Monitor labs: electrolytes, cmp  3. Monitor weights   4. Pain and bowel regimen per team

## 2024-01-13 NOTE — PROGRESS NOTE ADULT - SUBJECTIVE AND OBJECTIVE BOX
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 concerning for new laryngeal mass. Patient lives and receives care in [facility]. He reports increased cough and mucous production associated with intermittent episodes of worsening shortness of breath for the past month. This has prompted ED visits for evaluation and discharge with steroid courses, which temporarily improved his breathing difficulty, however, patient also has glaucoma and has been advised by his ophthalmologist to avoid futures courses of steroids. He has had a hoarse, breathy voice for the past six months. Son in law reports significant weight loss over past few months. He is PEG-dependent and does not have any oral intake. Non-smoker, social alcohol use.      Subjective/ Interval:   : AFVSS on RA ON. Patient seen and evaluated this am. Pt with inspiratory stridor and increased secretions. Pt taken for a trach, DL with biopsy. Pt tolerated surgery well and was brought to SICU.  : AFVSS on TC. Patient seen and evaluated this am. Trach cuff deflated. Trach site dry. Mucous suctioned from trach.      ALLERGIES:  penicillin (Rash)      MEDICATIONS:  Antiinfectives:     IV fluids:  dextrose 5%. 1000 milliLiter(s) IV Continuous <Continuous>  dextrose 5%. 1000 milliLiter(s) IV Continuous <Continuous>  lactated ringers. 1000 milliLiter(s) IV Continuous <Continuous>    Hematologic/Anticoagulation:    Pain medications/Neuro:  acetaminophen   Oral Liquid .. 650 milliGRAM(s) Enteral Tube every 6 hours PRN  HYDROmorphone  Injectable 0.5 milliGRAM(s) IV Push every 15 minutes PRN  ondansetron Injectable 4 milliGRAM(s) IV Push every 6 hours PRN  oxycodone    5 mG/acetaminophen 325 mG 1 Tablet(s) Oral/Enteral Tube every 4 hours PRN    Endocrine Medications:   dextrose 50% Injectable 12.5 Gram(s) IV Push once  dextrose 50% Injectable 25 Gram(s) IV Push once  dextrose 50% Injectable 25 Gram(s) IV Push once  dextrose Oral Gel 15 Gram(s) Oral once PRN  glucagon  Injectable 1 milliGRAM(s) IntraMuscular once  insulin lispro (ADMELOG) corrective regimen sliding scale   SubCutaneous every 6 hours    All other standing medications:   chlorhexidine 4% Liquid 1 Application(s) Topical <User Schedule>  influenza  Vaccine (HIGH DOSE) 0.7 milliLiter(s) IntraMuscular once  tiotropium 2.5 MICROgram(s) Inhaler 2 Puff(s) Inhalation daily    All other PRN medications:  albuterol    90 MICROgram(s) HFA Inhaler 2 Puff(s) Inhalation every 6 hours PRN    Vital Signs Last 24 Hrs  T(C): 36.8 (2024 08:30), Max: 36.8 (2024 14:18)  T(F): 98.3 (2024 08:30), Max: 98.3 (2024 08:30)  HR: 70 (2024 08:50) (70 - 90)  BP: 110/63 (2024 08:00) (80/51 - 123/59)  BP(mean): 82 (2024 08:00) (61 - 86)  RR: 16 (2024 08:50) (10 - 22)  SpO2: 98% (2024 08:50) (94% - 100%)    Parameters below as of 2024 08:50  Patient On (Oxygen Delivery Method): tracheostomy collar  O2 Flow (L/min): 10  O2 Concentration (%): 35       @ 07:  -   @ 07:00  --------------------------------------------------------  IN:    Enteral Tube Flush: 100 mL    Jevity 1.2: 600 mL    Lactated Ringers: 840 mL    Lactated Ringers Bolus: 500 mL  Total IN: 2040 mL    OUT:    Intermittent Catheterization - Urethral (mL): 600 mL  Total OUT: 600 mL    Total NET: 1440 mL       @ 07: @ 09:31  --------------------------------------------------------  IN:    Lactated Ringers: 60 mL  Total IN: 60 mL    OUT:  Total OUT: 0 mL    Total NET: 60 mL        PHYSICAL EXAM:  GEN: appears stated age  NEURO: alert & oriented x   HEENT: trach sutured in place, no bleeding noted  CVS: regular rate and rhythm  Pulm: normal respiratory excursions, not tachypneic, no labored breathing  Abd: non-distended  Ext: moving all four extremities, no peripheral edema noted       LABS                       11.3   7.94  )-----------( 212      ( 2024 05:30 )             34.9        136  |  98  |  27<H>  ----------------------------<  208<H>  4.6   |  30  |  0.76    Ca    8.8      2024 05:30  Phos  3.0       Mg     2.0                Coagulation Studies-     Urinalysis Basic - ( 2024 05:30 )    Color: x / Appearance: x / SG: x / pH: x  Gluc: 208 mg/dL / Ketone: x  / Bili: x / Urobili: x   Blood: x / Protein: x / Nitrite: x   Leuk Esterase: x / RBC: x / WBC x   Sq Epi: x / Non Sq Epi: x / Bacteria: x      Endocrine Panel-  Calcium: 8.8 mg/dL ( @ 05:30)         OTOLARYNGOLOGY (ENT) PROGRESS NOTE    PATIENT: KUSUM GROVE  MRN: [id number]  : 35  [id number]  DATE OF SERVICE:  24  			         ID:KUSUM GROVE is an 88M with history of known R sided laryngeal mass (known to Dr. 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 Patient lives and receives care in [facility]. He reports increased cough and mucous production associated with intermittent episodes of worsening shortness of breath for the past month. This has prompted ED visits for evaluation and discharge with steroid courses, which temporarily improved his breathing difficulty, however, patient also has glaucoma and has been advised by his ophthalmologist to avoid futures courses of steroids. He has had a hoarse, breathy voice for the past six months. Son in law reports significant weight loss over past few months. He is PEG-dependent and does not have any oral intake. Non-smoker, social alcohol use.      Subjective/ Interval:   : AFVSS on RA ON. Patient seen and evaluated this am. Pt with inspiratory stridor and increased secretions. Pt taken for a trach, DL with biopsy. Pt tolerated surgery well and was brought to SICU.  : AFVSS on TC. Patient seen and evaluated this am. Trach cuff deflated. Trach site dry. Mucous suctioned from trach.      ALLERGIES:  penicillin (Rash)      MEDICATIONS:  Antiinfectives:     IV fluids:  dextrose 5%. 1000 milliLiter(s) IV Continuous <Continuous>  dextrose 5%. 1000 milliLiter(s) IV Continuous <Continuous>  lactated ringers. 1000 milliLiter(s) IV Continuous <Continuous>    Hematologic/Anticoagulation:    Pain medications/Neuro:  acetaminophen   Oral Liquid .. 650 milliGRAM(s) Enteral Tube every 6 hours PRN  HYDROmorphone  Injectable 0.5 milliGRAM(s) IV Push every 15 minutes PRN  ondansetron Injectable 4 milliGRAM(s) IV Push every 6 hours PRN  oxycodone    5 mG/acetaminophen 325 mG 1 Tablet(s) Oral/Enteral Tube every 4 hours PRN    Endocrine Medications:   dextrose 50% Injectable 12.5 Gram(s) IV Push once  dextrose 50% Injectable 25 Gram(s) IV Push once  dextrose 50% Injectable 25 Gram(s) IV Push once  dextrose Oral Gel 15 Gram(s) Oral once PRN  glucagon  Injectable 1 milliGRAM(s) IntraMuscular once  insulin lispro (ADMELOG) corrective regimen sliding scale   SubCutaneous every 6 hours    All other standing medications:   chlorhexidine 4% Liquid 1 Application(s) Topical <User Schedule>  influenza  Vaccine (HIGH DOSE) 0.7 milliLiter(s) IntraMuscular once  tiotropium 2.5 MICROgram(s) Inhaler 2 Puff(s) Inhalation daily    All other PRN medications:  albuterol    90 MICROgram(s) HFA Inhaler 2 Puff(s) Inhalation every 6 hours PRN    Vital Signs Last 24 Hrs  T(C): 36.8 (2024 08:30), Max: 36.8 (2024 14:18)  T(F): 98.3 (2024 08:30), Max: 98.3 (2024 08:30)  HR: 70 (2024 08:50) (70 - 90)  BP: 110/63 (2024 08:00) (80/51 - 123/59)  BP(mean): 82 (2024 08:00) (61 - 86)  RR: 16 (2024 08:50) (10 - 22)  SpO2: 98% (2024 08:50) (94% - 100%)    Parameters below as of 2024 08:50  Patient On (Oxygen Delivery Method): tracheostomy collar  O2 Flow (L/min): 10  O2 Concentration (%): 35       @ 07:  -   @ 07:00  --------------------------------------------------------  IN:    Enteral Tube Flush: 100 mL    Jevity 1.2: 600 mL    Lactated Ringers: 840 mL    Lactated Ringers Bolus: 500 mL  Total IN: 2040 mL    OUT:    Intermittent Catheterization - Urethral (mL): 600 mL  Total OUT: 600 mL    Total NET: 1440 mL       @ 07: @ 09:31  --------------------------------------------------------  IN:    Lactated Ringers: 60 mL  Total IN: 60 mL    OUT:  Total OUT: 0 mL    Total NET: 60 mL        PHYSICAL EXAM:  GEN: appears stated age  NEURO: alert & oriented x   HEENT: trach sutured in place, no bleeding noted  CVS: regular rate and rhythm  Pulm: normal respiratory excursions, not tachypneic, no labored breathing  Abd: non-distended  Ext: moving all four extremities, no peripheral edema noted       LABS                       11.3   7.94  )-----------( 212      ( 2024 05:30 )             34.9        136  |  98  |  27<H>  ----------------------------<  208<H>  4.6   |  30  |  0.76    Ca    8.8      2024 05:30  Phos  3.0       Mg     2.0                Coagulation Studies-     Urinalysis Basic - ( 2024 05:30 )    Color: x / Appearance: x / SG: x / pH: x  Gluc: 208 mg/dL / Ketone: x  / Bili: x / Urobili: x   Blood: x / Protein: x / Nitrite: x   Leuk Esterase: x / RBC: x / WBC x   Sq Epi: x / Non Sq Epi: x / Bacteria: x      Endocrine Panel-  Calcium: 8.8 mg/dL ( @ 05:30)         OTOLARYNGOLOGY (ENT) PROGRESS NOTE    PATIENT: KUSUM GROVE  MRN: [id number]  : 35  [id number]  DATE OF SERVICE:  24  			         ID:KUSUM GROVE is an 88M with history of known R sided laryngeal mass (known to Dr. 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 Patient lives and receives care in [facility]. He reports increased cough and mucous production associated with intermittent episodes of worsening shortness of breath for the past month. This has prompted ED visits for evaluation and discharge with steroid courses, which temporarily improved his breathing difficulty, however, patient also has glaucoma and has been advised by his ophthalmologist to avoid futures courses of steroids. He has had a hoarse, breathy voice for the past six months. Son in law reports significant weight loss over past few months. He is PEG-dependent and does not have any oral intake. Non-smoker, social alcohol use.      Subjective/ Interval:   : AFVSS on RA ON. Patient seen and evaluated this am. Pt with inspiratory stridor and increased secretions. Pt taken for a trach, DL with biopsy. Pt tolerated surgery well and was brought to SICU.  : AFVSS on TC. Patient seen and evaluated this am. Trach cuff deflated. Trach site dry. Mucous suctioned from trach.      ALLERGIES:  penicillin (Rash)      MEDICATIONS:  Antiinfectives:     IV fluids:  dextrose 5%. 1000 milliLiter(s) IV Continuous <Continuous>  dextrose 5%. 1000 milliLiter(s) IV Continuous <Continuous>  lactated ringers. 1000 milliLiter(s) IV Continuous <Continuous>    Hematologic/Anticoagulation:    Pain medications/Neuro:  acetaminophen   Oral Liquid .. 650 milliGRAM(s) Enteral Tube every 6 hours PRN  HYDROmorphone  Injectable 0.5 milliGRAM(s) IV Push every 15 minutes PRN  ondansetron Injectable 4 milliGRAM(s) IV Push every 6 hours PRN  oxycodone    5 mG/acetaminophen 325 mG 1 Tablet(s) Oral/Enteral Tube every 4 hours PRN    Endocrine Medications:   dextrose 50% Injectable 12.5 Gram(s) IV Push once  dextrose 50% Injectable 25 Gram(s) IV Push once  dextrose 50% Injectable 25 Gram(s) IV Push once  dextrose Oral Gel 15 Gram(s) Oral once PRN  glucagon  Injectable 1 milliGRAM(s) IntraMuscular once  insulin lispro (ADMELOG) corrective regimen sliding scale   SubCutaneous every 6 hours    All other standing medications:   chlorhexidine 4% Liquid 1 Application(s) Topical <User Schedule>  influenza  Vaccine (HIGH DOSE) 0.7 milliLiter(s) IntraMuscular once  tiotropium 2.5 MICROgram(s) Inhaler 2 Puff(s) Inhalation daily    All other PRN medications:  albuterol    90 MICROgram(s) HFA Inhaler 2 Puff(s) Inhalation every 6 hours PRN    Vital Signs Last 24 Hrs  T(C): 36.8 (2024 08:30), Max: 36.8 (2024 14:18)  T(F): 98.3 (2024 08:30), Max: 98.3 (2024 08:30)  HR: 70 (2024 08:50) (70 - 90)  BP: 110/63 (2024 08:00) (80/51 - 123/59)  BP(mean): 82 (2024 08:00) (61 - 86)  RR: 16 (2024 08:50) (10 - 22)  SpO2: 98% (2024 08:50) (94% - 100%)    Parameters below as of 2024 08:50  Patient On (Oxygen Delivery Method): tracheostomy collar  O2 Flow (L/min): 10  O2 Concentration (%): 35       @ 07:  -   @ 07:00  --------------------------------------------------------  IN:    Enteral Tube Flush: 100 mL    Jevity 1.2: 600 mL    Lactated Ringers: 840 mL    Lactated Ringers Bolus: 500 mL  Total IN: 2040 mL    OUT:    Intermittent Catheterization - Urethral (mL): 600 mL  Total OUT: 600 mL    Total NET: 1440 mL       @ 07: @ 09:31  --------------------------------------------------------  IN:    Lactated Ringers: 60 mL  Total IN: 60 mL    OUT:  Total OUT: 0 mL    Total NET: 60 mL        PHYSICAL EXAM:  GEN: appears stated age  NEURO: alert & oriented x   HEENT: trach sutured in place, no bleeding noted  CVS: regular rate and rhythm  Pulm: normal respiratory excursions, not tachypneic, no labored breathing  Abd: non-distended  Ext: moving all four extremities, no peripheral edema noted       LABS                       11.3   7.94  )-----------( 212      ( 2024 05:30 )             34.9        136  |  98  |  27<H>  ----------------------------<  208<H>  4.6   |  30  |  0.76    Ca    8.8      2024 05:30  Phos  3.0       Mg     2.0                Coagulation Studies-     Urinalysis Basic - ( 2024 05:30 )    Color: x / Appearance: x / SG: x / pH: x  Gluc: 208 mg/dL / Ketone: x  / Bili: x / Urobili: x   Blood: x / Protein: x / Nitrite: x   Leuk Esterase: x / RBC: x / WBC x   Sq Epi: x / Non Sq Epi: x / Bacteria: x      Endocrine Panel-  Calcium: 8.8 mg/dL ( @ 05:30)

## 2024-01-13 NOTE — DIETITIAN INITIAL EVALUATION ADULT - PERTINENT LABORATORY DATA
01-13    136  |  98  |  27<H>  ----------------------------<  208<H>  4.6   |  30  |  0.76    Ca    8.8      13 Jan 2024 05:30  Phos  3.0     01-13  Mg     2.0     01-13    POCT Blood Glucose.: 205 mg/dL (01-13-24 @ 05:53)

## 2024-01-13 NOTE — DIETITIAN INITIAL EVALUATION ADULT - NS FNS DIET ORDER
Diet, NPO with Tube Feed:   Tube Feeding Modality: Gastrostomy  Jevity 1.2 Phil (JEVITY1.2RTH)  Total Volume for 24 Hours (mL): 1200  Total Number of Cans: 5  Bolus  Total Volume of Bolus (mL):  300  Tube Feed Frequency: Every 6 hours   Tube Feed Start Time: 19:00  Bolus Feed Rate (mL per Hour): 300   Bolus Feed Duration (in Hours): 0  Free Water Flush  Bolus   Total Volume per Flush (mL): 50   Frequency: Every 6 Hours (01-12-24 @ 21:52)

## 2024-01-13 NOTE — DIETITIAN INITIAL EVALUATION ADULT - NSFNSPHYEXAMSKINFT_GEN_A_CORE
Pressure Injury 1: Left:, buttocks, Stage II  Pressure Injury 2: Right:, buttocks, none  Pressure Injury 3: none, none  Pressure Injury 4: none, none  Pressure Injury 5: none, none  Pressure Injury 6: none, none  Pressure Injury 7: none, none  Pressure Injury 8: none, none  Pressure Injury 9: none, none  Pressure Injury 10: none, none  Pressure Injury 11: none, none

## 2024-01-13 NOTE — PROGRESS NOTE ADULT - ASSESSMENT
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 CT scan in [facility] concerning for recurrence of cancer, FOE with large ulcerative lesion R arytenoid/FVF obscuring view of R TVF but likely immobile, L nodular arytenoid mass, L TVF mobile. Now s/p direct laryngoscopy w/ biopsy and trach placement on 1/12. Transferred to SICU postop.    Neuro: Pain: Tylenol and Oxycodone PRN. Nausea: Zofran PRN.   HEENT: Hx of prior R larngyeal SCC s/p RT with concern for recurrence, s/p biopsy and trach 1/12. No steroids.   Pulm: TC as tolerated. Hx of COPD: Spiriva and Albuterol PRN.  CV: MAP > 65. Hx HTN: hold lisinopril 10.   GI: s/p PEG, on TF Jevity 1.2 bolus feeds. HLIV. Bowel Regimen: Miralax daily.  : Urinary retention: pending TOV at Noon.  Endo: ISS  Heme: no active issues  Ppx: start SQL today, SCDs  Lines/Drains: PIV   Dispo: SDU 88M w PMH of R sided laryngeal mass (known to Dr. 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 CT scan in [facility] concerning for recurrence of cancer, FOE with large ulcerative lesion R arytenoid/FVF obscuring view of R TVF but likely immobile, L nodular arytenoid mass, L TVF mobile. Now s/p direct laryngoscopy w/ biopsy and trach placement on 1/12. Transferred to SICU postop.    Neuro: Pain: Tylenol and Oxycodone PRN. Nausea: Zofran PRN.   HEENT: Hx of prior R larngyeal SCC s/p RT with concern for recurrence, s/p biopsy and trach 1/12. No steroids.   Pulm: TC as tolerated. Hx of COPD: Spiriva and Albuterol PRN.  CV: MAP > 65. Hx HTN: hold lisinopril 10.   GI: s/p PEG, on TF Jevity 1.2 bolus feeds. HLIV. Bowel Regimen: Miralax daily.  : Urinary retention: pending TOV at Noon.  Endo: ISS  Heme: no active issues  Ppx: start SQL today, SCDs  Lines/Drains: PIV   Dispo: SDU

## 2024-01-13 NOTE — DIETITIAN INITIAL EVALUATION ADULT - OTHER INFO
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 Patient lives and receives care in [facility]. He reports increased cough and mucous production associated with intermittent episodes of worsening shortness of breath for the past month. This has prompted ED visits for evaluation and discharge with steroid courses, which temporarily improved his breathing difficulty, however, patient also has glaucoma and has been advised by his ophthalmologist to avoid futures courses of steroids. He has had a hoarse, breathy voice for the past six months. Son in law reports significant weight loss over past few months. He is PEG-dependent and does not have any oral intake. Non-smoker, social alcohol use.    Patient seen at bedside for nutrition assessment. Current diet order: NPO tube feed via PEG Jevity 1.2 bolus feeds 300mL q6hrs, free water flushes of 50mL q6hrs. See below for updated tube feed recommendations. Unable to speak with patient, was being rolled off unit to CT then plan for step down. Spoke with patient's son Randy over the phone. Randy reports that patient had PEG placed in May of 2023 but stopped using it as he was able to eat PO. In December of 2023, patient started having difficulty swallowing and was no longer able to eat PO. He began giving himself formula through the PEG. Randy reports patient was giving himself Ensure at first then 5 days ago, switched to Nutren. However, reports patient was only giving himself ~2 cans/day. This regimen does not meet patient's needs and explained to Randy that patient will be recommended for a specific bolus regimen tailored to better meet patient's needs. No known food allergies. Patient with laryngeal mass s/p laryngoscopy and tracheostomy placement 1/12. Dosing weight: 114 pounds, BMI 16.8, reports UBW of 140 pounds x6 months ago. 26lb/19% weight loss x6 months - clinically significant. Stage 2 pressure ulcer to L buttocks, pressure ulcer to R buttocks. No edema documented. No nausea/vomiting/diarrhea/constipation noted in EMR. Blood sugar 136-208 x24 hours Meds: bowel regimen, zofran. Observed with severe muscle wasting to temples and severe fat wasting to buccals and orbitals. Based on ASPEN guidelines, pt meets criteria for severe malnutrition.  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 Crisostomo, biopsy R piriform sinus 5/2023 pos. for SCC, now s/p RT, PEG-dependent) presenting with worsening shortness of breath for past month and new imaging findings on CT scan from one week ago in [facility] concerning for new laryngeal mass. Patient lives and receives care in [facility] He reports increased cough and mucous production associated with intermittent episodes of worsening shortness of breath for the past month. This has prompted ED visits for evaluation and discharge with steroid courses, which temporarily improved his breathing difficulty, however, patient also has glaucoma and has been advised by his ophthalmologist to avoid futures courses of steroids. He has had a hoarse, breathy voice for the past six months. Son in law reports significant weight loss over past few months. He is PEG-dependent and does not have any oral intake. Non-smoker, social alcohol use.    Patient seen at bedside for nutrition assessment. Current diet order: NPO tube feed via PEG Jevity 1.2 bolus feeds 300mL q6hrs, free water flushes of 50mL q6hrs. See below for updated tube feed recommendations. Unable to speak with patient, was being rolled off unit to CT then plan for step down. Spoke with patient's son Randy over the phone. Randy reports that patient had PEG placed in May of 2023 but stopped using it as he was able to eat PO. In December of 2023, patient started having difficulty swallowing and was no longer able to eat PO. He began giving himself formula through the PEG. Randy reports patient was giving himself Ensure at first then 5 days ago, switched to Nutren. However, reports patient was only giving himself ~2 cans/day. This regimen does not meet patient's needs and explained to Randy that patient will be recommended for a specific bolus regimen tailored to better meet patient's needs. No known food allergies. Patient with laryngeal mass s/p laryngoscopy and tracheostomy placement 1/12. Dosing weight: 114 pounds, BMI 16.8, reports UBW of 140 pounds x6 months ago. 26lb/19% weight loss x6 months - clinically significant. Stage 2 pressure ulcer to L buttocks, pressure ulcer to R buttocks. No edema documented. No nausea/vomiting/diarrhea/constipation noted in EMR. Blood sugar 136-208 x24 hours Meds: bowel regimen, zofran. Observed with severe muscle wasting to temples and severe fat wasting to buccals and orbitals. Based on ASPEN guidelines, pt meets criteria for severe malnutrition.

## 2024-01-14 LAB
ANION GAP SERPL CALC-SCNC: 7 MMOL/L — SIGNIFICANT CHANGE UP (ref 5–17)
ANION GAP SERPL CALC-SCNC: 7 MMOL/L — SIGNIFICANT CHANGE UP (ref 5–17)
BUN SERPL-MCNC: 24 MG/DL — HIGH (ref 7–23)
BUN SERPL-MCNC: 24 MG/DL — HIGH (ref 7–23)
CALCIUM SERPL-MCNC: 8.6 MG/DL — SIGNIFICANT CHANGE UP (ref 8.4–10.5)
CALCIUM SERPL-MCNC: 8.6 MG/DL — SIGNIFICANT CHANGE UP (ref 8.4–10.5)
CHLORIDE SERPL-SCNC: 101 MMOL/L — SIGNIFICANT CHANGE UP (ref 96–108)
CHLORIDE SERPL-SCNC: 101 MMOL/L — SIGNIFICANT CHANGE UP (ref 96–108)
CO2 SERPL-SCNC: 29 MMOL/L — SIGNIFICANT CHANGE UP (ref 22–31)
CO2 SERPL-SCNC: 29 MMOL/L — SIGNIFICANT CHANGE UP (ref 22–31)
CREAT SERPL-MCNC: 0.78 MG/DL — SIGNIFICANT CHANGE UP (ref 0.5–1.3)
CREAT SERPL-MCNC: 0.78 MG/DL — SIGNIFICANT CHANGE UP (ref 0.5–1.3)
EGFR: 86 ML/MIN/1.73M2 — SIGNIFICANT CHANGE UP
EGFR: 86 ML/MIN/1.73M2 — SIGNIFICANT CHANGE UP
GLUCOSE BLDC GLUCOMTR-MCNC: 102 MG/DL — HIGH (ref 70–99)
GLUCOSE BLDC GLUCOMTR-MCNC: 102 MG/DL — HIGH (ref 70–99)
GLUCOSE BLDC GLUCOMTR-MCNC: 110 MG/DL — HIGH (ref 70–99)
GLUCOSE BLDC GLUCOMTR-MCNC: 110 MG/DL — HIGH (ref 70–99)
GLUCOSE BLDC GLUCOMTR-MCNC: 111 MG/DL — HIGH (ref 70–99)
GLUCOSE BLDC GLUCOMTR-MCNC: 111 MG/DL — HIGH (ref 70–99)
GLUCOSE BLDC GLUCOMTR-MCNC: 116 MG/DL — HIGH (ref 70–99)
GLUCOSE BLDC GLUCOMTR-MCNC: 116 MG/DL — HIGH (ref 70–99)
GLUCOSE BLDC GLUCOMTR-MCNC: 92 MG/DL — SIGNIFICANT CHANGE UP (ref 70–99)
GLUCOSE BLDC GLUCOMTR-MCNC: 92 MG/DL — SIGNIFICANT CHANGE UP (ref 70–99)
GLUCOSE SERPL-MCNC: 105 MG/DL — HIGH (ref 70–99)
GLUCOSE SERPL-MCNC: 105 MG/DL — HIGH (ref 70–99)
HCT VFR BLD CALC: 32.6 % — LOW (ref 39–50)
HCT VFR BLD CALC: 32.6 % — LOW (ref 39–50)
HGB BLD-MCNC: 10.5 G/DL — LOW (ref 13–17)
HGB BLD-MCNC: 10.5 G/DL — LOW (ref 13–17)
MAGNESIUM SERPL-MCNC: 1.9 MG/DL — SIGNIFICANT CHANGE UP (ref 1.6–2.6)
MAGNESIUM SERPL-MCNC: 1.9 MG/DL — SIGNIFICANT CHANGE UP (ref 1.6–2.6)
MCHC RBC-ENTMCNC: 31.5 PG — SIGNIFICANT CHANGE UP (ref 27–34)
MCHC RBC-ENTMCNC: 31.5 PG — SIGNIFICANT CHANGE UP (ref 27–34)
MCHC RBC-ENTMCNC: 32.2 GM/DL — SIGNIFICANT CHANGE UP (ref 32–36)
MCHC RBC-ENTMCNC: 32.2 GM/DL — SIGNIFICANT CHANGE UP (ref 32–36)
MCV RBC AUTO: 97.9 FL — SIGNIFICANT CHANGE UP (ref 80–100)
MCV RBC AUTO: 97.9 FL — SIGNIFICANT CHANGE UP (ref 80–100)
NRBC # BLD: 0 /100 WBCS — SIGNIFICANT CHANGE UP (ref 0–0)
NRBC # BLD: 0 /100 WBCS — SIGNIFICANT CHANGE UP (ref 0–0)
PHOSPHATE SERPL-MCNC: 3.2 MG/DL — SIGNIFICANT CHANGE UP (ref 2.5–4.5)
PHOSPHATE SERPL-MCNC: 3.2 MG/DL — SIGNIFICANT CHANGE UP (ref 2.5–4.5)
PLATELET # BLD AUTO: 219 K/UL — SIGNIFICANT CHANGE UP (ref 150–400)
PLATELET # BLD AUTO: 219 K/UL — SIGNIFICANT CHANGE UP (ref 150–400)
POTASSIUM SERPL-MCNC: 4.5 MMOL/L — SIGNIFICANT CHANGE UP (ref 3.5–5.3)
POTASSIUM SERPL-MCNC: 4.5 MMOL/L — SIGNIFICANT CHANGE UP (ref 3.5–5.3)
POTASSIUM SERPL-SCNC: 4.5 MMOL/L — SIGNIFICANT CHANGE UP (ref 3.5–5.3)
POTASSIUM SERPL-SCNC: 4.5 MMOL/L — SIGNIFICANT CHANGE UP (ref 3.5–5.3)
RBC # BLD: 3.33 M/UL — LOW (ref 4.2–5.8)
RBC # BLD: 3.33 M/UL — LOW (ref 4.2–5.8)
RBC # FLD: 13.6 % — SIGNIFICANT CHANGE UP (ref 10.3–14.5)
RBC # FLD: 13.6 % — SIGNIFICANT CHANGE UP (ref 10.3–14.5)
SODIUM SERPL-SCNC: 137 MMOL/L — SIGNIFICANT CHANGE UP (ref 135–145)
SODIUM SERPL-SCNC: 137 MMOL/L — SIGNIFICANT CHANGE UP (ref 135–145)
WBC # BLD: 8.87 K/UL — SIGNIFICANT CHANGE UP (ref 3.8–10.5)
WBC # BLD: 8.87 K/UL — SIGNIFICANT CHANGE UP (ref 3.8–10.5)
WBC # FLD AUTO: 8.87 K/UL — SIGNIFICANT CHANGE UP (ref 3.8–10.5)
WBC # FLD AUTO: 8.87 K/UL — SIGNIFICANT CHANGE UP (ref 3.8–10.5)

## 2024-01-14 RX ORDER — ACETYLCYSTEINE 200 MG/ML
4 VIAL (ML) MISCELLANEOUS
Refills: 0 | Status: DISCONTINUED | OUTPATIENT
Start: 2024-01-14 | End: 2024-02-01

## 2024-01-14 RX ADMIN — Medication 1 MILLIGRAM(S): at 21:30

## 2024-01-14 RX ADMIN — ENOXAPARIN SODIUM 40 MILLIGRAM(S): 100 INJECTION SUBCUTANEOUS at 09:02

## 2024-01-14 RX ADMIN — CHLORHEXIDINE GLUCONATE 1 APPLICATION(S): 213 SOLUTION TOPICAL at 06:22

## 2024-01-14 RX ADMIN — Medication 4 MILLILITER(S): at 17:16

## 2024-01-14 RX ADMIN — POLYETHYLENE GLYCOL 3350 17 GRAM(S): 17 POWDER, FOR SOLUTION ORAL at 11:46

## 2024-01-14 NOTE — PROGRESS NOTE ADULT - SUBJECTIVE AND OBJECTIVE BOX
OTOLARYNGOLOGY (ENT) PROGRESS NOTE    PATIENT: KUSUM GROVE  MRN: [id number]  : 35  [id number]  DATE OF SERVICE:  24  			         ID:KUSUM GROVE is an 88M with history of known R sided laryngeal mass (known to Dr. 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 Patient lives and receives care in [facility]. He reports increased cough and mucous production associated with intermittent episodes of worsening shortness of breath for the past month. This has prompted ED visits for evaluation and discharge with steroid courses, which temporarily improved his breathing difficulty, however, patient also has glaucoma and has been advised by his ophthalmologist to avoid futures courses of steroids. He has had a hoarse, breathy voice for the past six months. Son in law reports significant weight loss over past few months. He is PEG-dependent and does not have any oral intake. Non-smoker, social alcohol use.      Subjective/ Interval:   : AFVSS on RA ON. Patient seen and evaluated this am. Pt with inspiratory stridor and increased secretions. Pt taken for a trach, DL with biopsy. Pt tolerated surgery well and was brought to SICU.  : AFVSS on TC. Patient seen and evaluated this am. Trach cuff deflated. Trach site dry. Mucous suctioned from trach.  : AFVSS on TC. Patient seen and evaluated this am doing well. Cuff remains deflated. Trach site dry. Some mucous suctioned from trach. Pt seen spitting up white-tinged mucous.    ALLERGIES:  penicillin (Rash)      MEDICATIONS:  Antiinfectives:     IV fluids:  dextrose 5%. 1000 milliLiter(s) IV Continuous <Continuous>  dextrose 5%. 1000 milliLiter(s) IV Continuous <Continuous>    Hematologic/Anticoagulation:  enoxaparin Injectable 40 milliGRAM(s) SubCutaneous every 24 hours    Pain medications/Neuro:  acetaminophen   Oral Liquid .. 650 milliGRAM(s) Enteral Tube every 6 hours PRN  ondansetron Injectable 4 milliGRAM(s) IV Push every 6 hours PRN  oxyCODONE    IR 2.5 milliGRAM(s) Oral every 6 hours PRN  oxyCODONE    IR 5 milliGRAM(s) Oral every 6 hours PRN    Endocrine Medications:   dextrose 50% Injectable 12.5 Gram(s) IV Push once  dextrose 50% Injectable 25 Gram(s) IV Push once  dextrose 50% Injectable 25 Gram(s) IV Push once  dextrose Oral Gel 15 Gram(s) Oral once PRN  glucagon  Injectable 1 milliGRAM(s) IntraMuscular once  insulin lispro (ADMELOG) corrective regimen sliding scale   SubCutaneous every 6 hours    All other standing medications:   chlorhexidine 4% Liquid 1 Application(s) Topical <User Schedule>  doxazosin 1 milliGRAM(s) Oral at bedtime  influenza  Vaccine (HIGH DOSE) 0.7 milliLiter(s) IntraMuscular once  polyethylene glycol 3350 17 Gram(s) Oral every 24 hours    All other PRN medications:  albuterol    90 MICROgram(s) HFA Inhaler 2 Puff(s) Inhalation every 6 hours PRN    Vital Signs Last 24 Hrs  T(C): 36.3 (2024 05:00), Max: 36.8 (2024 13:00)  T(F): 97.4 (2024 05:00), Max: 98.3 (2024 17:12)  HR: 82 (2024 08:00) (68 - 91)  BP: 124/58 (2024 08:00) (97/54 - 125/67)  BP(mean): 83 (2024 08:00) (66 - 90)  RR: 18 (2024 08:00) (15 - 22)  SpO2: 98% (2024 08:00) (95% - 99%)    Parameters below as of 2024 08:00  Patient On (Oxygen Delivery Method): nasal cannula  O2 Flow (L/min): 10  O2 Concentration (%): 35      -13 @ 07:01  -  01-14 @ 07:00  --------------------------------------------------------  IN:    Enteral Tube Flush: 400 mL    Jevity 1.2: 1200 mL    Lactated Ringers: 120 mL  Total IN: 1720 mL    OUT:    Intermittent Catheterization - Urethral (mL): 575 mL    Voided (mL): 725 mL  Total OUT: 1300 mL    Total NET: 420 mL      PHYSICAL EXAM:  GEN: appears stated age, NAD  NEURO: alert & oriented x   HEENT: trach in place with sutures and trach ties, trach site dry, minimal mucous from trach  CVS: regular rate and rhythm  Pulm: normal respiratory excursions, not tachypneic, no labored breathing  Abd: non-distended  Ext: moving all four extremities, no peripheral edema noted       LABS                       10.5   8.87  )-----------( 219      ( 2024 05:30 )             32.6        137  |  101  |  24<H>  ----------------------------<  105<H>  4.5   |  29  |  0.78    Ca    8.6      2024 05:30  Phos  3.2       Mg     1.9                Coagulation Studies-     Urinalysis Basic - ( 2024 05:30 )    Color: x / Appearance: x / SG: x / pH: x  Gluc: 105 mg/dL / Ketone: x  / Bili: x / Urobili: x   Blood: x / Protein: x / Nitrite: x   Leuk Esterase: x / RBC: x / WBC x   Sq Epi: x / Non Sq Epi: x / Bacteria: x      Endocrine Panel-  Calcium: 8.6 mg/dL ( @ 05:30)   OTOLARYNGOLOGY (ENT) PROGRESS NOTE    PATIENT: KUSUM GROVE  MRN: [id number]  : 35  [id number]  DATE OF SERVICE:  24  			         ID:KUSUM GROVE is an 88M with history of known R sided laryngeal mass (known to Dr. Crisostomo, biopsy R piriform sinus 2023 pos. for SCC, now s/p RT, PEG-dependent) presenting with worsening shortness of breath for past month and new imaging findings on CT scan from one week ago in [facility] concerning for new laryngeal mass. Patient lives and receives care in [facility]. He reports increased cough and mucous production associated with intermittent episodes of worsening shortness of breath for the past month. This has prompted ED visits for evaluation and discharge with steroid courses, which temporarily improved his breathing difficulty, however, patient also has glaucoma and has been advised by his ophthalmologist to avoid futures courses of steroids. He has had a hoarse, breathy voice for the past six months. Son in law reports significant weight loss over past few months. He is PEG-dependent and does not have any oral intake. Non-smoker, social alcohol use.      Subjective/ Interval:   : AFVSS on RA ON. Patient seen and evaluated this am. Pt with inspiratory stridor and increased secretions. Pt taken for a trach, DL with biopsy. Pt tolerated surgery well and was brought to SICU.  : AFVSS on TC. Patient seen and evaluated this am. Trach cuff deflated. Trach site dry. Mucous suctioned from trach.  : AFVSS on TC. Patient seen and evaluated this am doing well. Cuff remains deflated. Trach site dry. Some mucous suctioned from trach. Pt seen spitting up white-tinged mucous.    ALLERGIES:  penicillin (Rash)      MEDICATIONS:  Antiinfectives:     IV fluids:  dextrose 5%. 1000 milliLiter(s) IV Continuous <Continuous>  dextrose 5%. 1000 milliLiter(s) IV Continuous <Continuous>    Hematologic/Anticoagulation:  enoxaparin Injectable 40 milliGRAM(s) SubCutaneous every 24 hours    Pain medications/Neuro:  acetaminophen   Oral Liquid .. 650 milliGRAM(s) Enteral Tube every 6 hours PRN  ondansetron Injectable 4 milliGRAM(s) IV Push every 6 hours PRN  oxyCODONE    IR 2.5 milliGRAM(s) Oral every 6 hours PRN  oxyCODONE    IR 5 milliGRAM(s) Oral every 6 hours PRN    Endocrine Medications:   dextrose 50% Injectable 12.5 Gram(s) IV Push once  dextrose 50% Injectable 25 Gram(s) IV Push once  dextrose 50% Injectable 25 Gram(s) IV Push once  dextrose Oral Gel 15 Gram(s) Oral once PRN  glucagon  Injectable 1 milliGRAM(s) IntraMuscular once  insulin lispro (ADMELOG) corrective regimen sliding scale   SubCutaneous every 6 hours    All other standing medications:   chlorhexidine 4% Liquid 1 Application(s) Topical <User Schedule>  doxazosin 1 milliGRAM(s) Oral at bedtime  influenza  Vaccine (HIGH DOSE) 0.7 milliLiter(s) IntraMuscular once  polyethylene glycol 3350 17 Gram(s) Oral every 24 hours    All other PRN medications:  albuterol    90 MICROgram(s) HFA Inhaler 2 Puff(s) Inhalation every 6 hours PRN    Vital Signs Last 24 Hrs  T(C): 36.3 (2024 05:00), Max: 36.8 (2024 13:00)  T(F): 97.4 (2024 05:00), Max: 98.3 (2024 17:12)  HR: 82 (2024 08:00) (68 - 91)  BP: 124/58 (2024 08:00) (97/54 - 125/67)  BP(mean): 83 (2024 08:00) (66 - 90)  RR: 18 (2024 08:00) (15 - 22)  SpO2: 98% (2024 08:00) (95% - 99%)    Parameters below as of 2024 08:00  Patient On (Oxygen Delivery Method): nasal cannula  O2 Flow (L/min): 10  O2 Concentration (%): 35      -13 @ 07:01  -  01-14 @ 07:00  --------------------------------------------------------  IN:    Enteral Tube Flush: 400 mL    Jevity 1.2: 1200 mL    Lactated Ringers: 120 mL  Total IN: 1720 mL    OUT:    Intermittent Catheterization - Urethral (mL): 575 mL    Voided (mL): 725 mL  Total OUT: 1300 mL    Total NET: 420 mL      PHYSICAL EXAM:  GEN: appears stated age, NAD  NEURO: alert & oriented x   HEENT: trach in place with sutures and trach ties, trach site dry, minimal mucous from trach  CVS: regular rate and rhythm  Pulm: normal respiratory excursions, not tachypneic, no labored breathing  Abd: non-distended  Ext: moving all four extremities, no peripheral edema noted       LABS                       10.5   8.87  )-----------( 219      ( 2024 05:30 )             32.6        137  |  101  |  24<H>  ----------------------------<  105<H>  4.5   |  29  |  0.78    Ca    8.6      2024 05:30  Phos  3.2       Mg     1.9                Coagulation Studies-     Urinalysis Basic - ( 2024 05:30 )    Color: x / Appearance: x / SG: x / pH: x  Gluc: 105 mg/dL / Ketone: x  / Bili: x / Urobili: x   Blood: x / Protein: x / Nitrite: x   Leuk Esterase: x / RBC: x / WBC x   Sq Epi: x / Non Sq Epi: x / Bacteria: x      Endocrine Panel-  Calcium: 8.6 mg/dL ( @ 05:30)   OTOLARYNGOLOGY (ENT) PROGRESS NOTE    PATIENT: KUSUM GROVE  MRN: [id number]  : 35  [id number]  DATE OF SERVICE:  24  			         ID:KUSUM GROVE is an 88M with history of known R sided laryngeal mass (known to Dr. Crisostomo, biopsy R piriform sinus 2023 pos. for SCC, now s/p RT, PEG-dependent) presenting with worsening shortness of breath for past month and new imaging findings on CT scan from one week ago in [facility] concerning for new laryngeal mass. Patient lives and receives care in [facility]. He reports increased cough and mucous production associated with intermittent episodes of worsening shortness of breath for the past month. This has prompted ED visits for evaluation and discharge with steroid courses, which temporarily improved his breathing difficulty, however, patient also has glaucoma and has been advised by his ophthalmologist to avoid futures courses of steroids. He has had a hoarse, breathy voice for the past six months. Son in law reports significant weight loss over past few months. He is PEG-dependent and does not have any oral intake. Non-smoker, social alcohol use.      Subjective/ Interval:   : AFVSS on RA ON. Patient seen and evaluated this am. Pt with inspiratory stridor and increased secretions. Pt taken for a trach, DL with biopsy. Pt tolerated surgery well and was brought to SICU.  : AFVSS on TC. Patient seen and evaluated this am. Trach cuff deflated. Trach site dry. Mucous suctioned from trach.  : AFVSS on TC. Patient seen and evaluated this am doing well. Cuff remains deflated. Trach site dry. Some mucous suctioned from trach. Pt seen spitting up white-tinged mucous.    ALLERGIES:  penicillin (Rash)      MEDICATIONS:  Antiinfectives:     IV fluids:  dextrose 5%. 1000 milliLiter(s) IV Continuous <Continuous>  dextrose 5%. 1000 milliLiter(s) IV Continuous <Continuous>    Hematologic/Anticoagulation:  enoxaparin Injectable 40 milliGRAM(s) SubCutaneous every 24 hours    Pain medications/Neuro:  acetaminophen   Oral Liquid .. 650 milliGRAM(s) Enteral Tube every 6 hours PRN  ondansetron Injectable 4 milliGRAM(s) IV Push every 6 hours PRN  oxyCODONE    IR 2.5 milliGRAM(s) Oral every 6 hours PRN  oxyCODONE    IR 5 milliGRAM(s) Oral every 6 hours PRN    Endocrine Medications:   dextrose 50% Injectable 12.5 Gram(s) IV Push once  dextrose 50% Injectable 25 Gram(s) IV Push once  dextrose 50% Injectable 25 Gram(s) IV Push once  dextrose Oral Gel 15 Gram(s) Oral once PRN  glucagon  Injectable 1 milliGRAM(s) IntraMuscular once  insulin lispro (ADMELOG) corrective regimen sliding scale   SubCutaneous every 6 hours    All other standing medications:   chlorhexidine 4% Liquid 1 Application(s) Topical <User Schedule>  doxazosin 1 milliGRAM(s) Oral at bedtime  influenza  Vaccine (HIGH DOSE) 0.7 milliLiter(s) IntraMuscular once  polyethylene glycol 3350 17 Gram(s) Oral every 24 hours    All other PRN medications:  albuterol    90 MICROgram(s) HFA Inhaler 2 Puff(s) Inhalation every 6 hours PRN    Vital Signs Last 24 Hrs  T(C): 36.3 (2024 05:00), Max: 36.8 (2024 13:00)  T(F): 97.4 (2024 05:00), Max: 98.3 (2024 17:12)  HR: 82 (2024 08:00) (68 - 91)  BP: 124/58 (2024 08:00) (97/54 - 125/67)  BP(mean): 83 (2024 08:00) (66 - 90)  RR: 18 (2024 08:00) (15 - 22)  SpO2: 98% (2024 08:00) (95% - 99%)    Parameters below as of 2024 08:00  Patient On (Oxygen Delivery Method): nasal cannula  O2 Flow (L/min): 10  O2 Concentration (%): 35      -13 @ 07:01  -  01-14 @ 07:00  --------------------------------------------------------  IN:    Enteral Tube Flush: 400 mL    Jevity 1.2: 1200 mL    Lactated Ringers: 120 mL  Total IN: 1720 mL    OUT:    Intermittent Catheterization - Urethral (mL): 575 mL    Voided (mL): 725 mL  Total OUT: 1300 mL    Total NET: 420 mL      PHYSICAL EXAM:  GEN: appears stated age, NAD  NEURO: alert & oriented x   HEENT: trach in place with sutures and trach ties, trach site dry, minimal mucous from trach  CVS: regular rate and rhythm  Pulm: normal respiratory excursions, not tachypneic, no labored breathing  Abd: non-distended  Ext: moving all four extremities, no peripheral edema noted       LABS                       10.5   8.87  )-----------( 219      ( 2024 05:30 )             32.6        137  |  101  |  24<H>  ----------------------------<  105<H>  4.5   |  29  |  0.78    Ca    8.6      2024 05:30  Phos  3.2       Mg     1.9                Coagulation Studies-     Urinalysis Basic - ( 2024 05:30 )    Color: x / Appearance: x / SG: x / pH: x  Gluc: 105 mg/dL / Ketone: x  / Bili: x / Urobili: x   Blood: x / Protein: x / Nitrite: x   Leuk Esterase: x / RBC: x / WBC x   Sq Epi: x / Non Sq Epi: x / Bacteria: x      Endocrine Panel-  Calcium: 8.6 mg/dL ( @ 05:30)

## 2024-01-14 NOTE — PROGRESS NOTE ADULT - ASSESSMENT
Informed pt that Dr. Cat Nunn will not be in today but will be in tomorrow to see pt. Pt started on full liquid diet at present time. Call light in reach. 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 Crisostomo, biopsy R piriform sinus 5/2023 pos. for SCC, now s/p RT, PEG-dependent) presenting with worsening shortness of breath for past month and new imaging findings on CT scan from one week ago in [facility] concerning for new laryngeal mass. FOE: large ulcerative lesion R arytenoid/FVF obscuring view of R TVF but likely immobile, L nodular arytenoid mass, L TVF mobile. However, patient in no acute distress, non-stridulous, breathing comfortably on RA.    Plan:  - Tube feeds at goal  - Cuff deflated  - Continue TC  - Plan pending tumor board discussion   88M with history of known R sided laryngeal mass (known to Dr. Crisostomo, biopsy R piriform sinus 5/2023 pos. for SCC, now s/p RT, PEG-dependent) presenting with worsening shortness of breath for past month and new imaging findings on CT scan from one week ago in [facility] concerning for new laryngeal mass. FOE: large ulcerative lesion R arytenoid/FVF obscuring view of R TVF but likely immobile, L nodular arytenoid mass, L TVF mobile. However, patient in no acute distress, non-stridulous, breathing comfortably on RA.    Plan:  - Tube feeds at goal  - Cuff deflated  - Continue TC  - Plan pending tumor board discussion   88M with history of known R sided laryngeal mass (known to Dr. Crisostomo, biopsy R piriform sinus 5/2023 pos. for SCC, now s/p RT, PEG-dependent) presenting with worsening shortness of breath for past month and new imaging findings on CT scan from one week ago in [facility] concerning for new laryngeal mass. FOE: large ulcerative lesion R arytenoid/FVF obscuring view of R TVF but likely immobile, L nodular arytenoid mass, L TVF mobile. However, patient in no acute distress, non-stridulous, breathing comfortably on RA.    Plan:  - Tube feeds at goal  - Cuff deflated  - Continue TC  - Plan pending tumor board discussion

## 2024-01-15 LAB
ANION GAP SERPL CALC-SCNC: 7 MMOL/L — SIGNIFICANT CHANGE UP (ref 5–17)
ANION GAP SERPL CALC-SCNC: 7 MMOL/L — SIGNIFICANT CHANGE UP (ref 5–17)
BUN SERPL-MCNC: 21 MG/DL — SIGNIFICANT CHANGE UP (ref 7–23)
BUN SERPL-MCNC: 21 MG/DL — SIGNIFICANT CHANGE UP (ref 7–23)
CALCIUM SERPL-MCNC: 8.5 MG/DL — SIGNIFICANT CHANGE UP (ref 8.4–10.5)
CALCIUM SERPL-MCNC: 8.5 MG/DL — SIGNIFICANT CHANGE UP (ref 8.4–10.5)
CHLORIDE SERPL-SCNC: 97 MMOL/L — SIGNIFICANT CHANGE UP (ref 96–108)
CHLORIDE SERPL-SCNC: 97 MMOL/L — SIGNIFICANT CHANGE UP (ref 96–108)
CO2 SERPL-SCNC: 29 MMOL/L — SIGNIFICANT CHANGE UP (ref 22–31)
CO2 SERPL-SCNC: 29 MMOL/L — SIGNIFICANT CHANGE UP (ref 22–31)
CREAT SERPL-MCNC: 0.71 MG/DL — SIGNIFICANT CHANGE UP (ref 0.5–1.3)
CREAT SERPL-MCNC: 0.71 MG/DL — SIGNIFICANT CHANGE UP (ref 0.5–1.3)
EGFR: 88 ML/MIN/1.73M2 — SIGNIFICANT CHANGE UP
EGFR: 88 ML/MIN/1.73M2 — SIGNIFICANT CHANGE UP
GLUCOSE BLDC GLUCOMTR-MCNC: 117 MG/DL — HIGH (ref 70–99)
GLUCOSE BLDC GLUCOMTR-MCNC: 117 MG/DL — HIGH (ref 70–99)
GLUCOSE BLDC GLUCOMTR-MCNC: 119 MG/DL — HIGH (ref 70–99)
GLUCOSE BLDC GLUCOMTR-MCNC: 119 MG/DL — HIGH (ref 70–99)
GLUCOSE BLDC GLUCOMTR-MCNC: 90 MG/DL — SIGNIFICANT CHANGE UP (ref 70–99)
GLUCOSE SERPL-MCNC: 107 MG/DL — HIGH (ref 70–99)
GLUCOSE SERPL-MCNC: 107 MG/DL — HIGH (ref 70–99)
GRAM STN FLD: ABNORMAL
HCT VFR BLD CALC: 32.1 % — LOW (ref 39–50)
HCT VFR BLD CALC: 32.1 % — LOW (ref 39–50)
HGB BLD-MCNC: 10.6 G/DL — LOW (ref 13–17)
HGB BLD-MCNC: 10.6 G/DL — LOW (ref 13–17)
MAGNESIUM SERPL-MCNC: 1.8 MG/DL — SIGNIFICANT CHANGE UP (ref 1.6–2.6)
MAGNESIUM SERPL-MCNC: 1.8 MG/DL — SIGNIFICANT CHANGE UP (ref 1.6–2.6)
MCHC RBC-ENTMCNC: 32.2 PG — SIGNIFICANT CHANGE UP (ref 27–34)
MCHC RBC-ENTMCNC: 32.2 PG — SIGNIFICANT CHANGE UP (ref 27–34)
MCHC RBC-ENTMCNC: 33 GM/DL — SIGNIFICANT CHANGE UP (ref 32–36)
MCHC RBC-ENTMCNC: 33 GM/DL — SIGNIFICANT CHANGE UP (ref 32–36)
MCV RBC AUTO: 97.6 FL — SIGNIFICANT CHANGE UP (ref 80–100)
MCV RBC AUTO: 97.6 FL — SIGNIFICANT CHANGE UP (ref 80–100)
NRBC # BLD: 0 /100 WBCS — SIGNIFICANT CHANGE UP (ref 0–0)
NRBC # BLD: 0 /100 WBCS — SIGNIFICANT CHANGE UP (ref 0–0)
PHOSPHATE SERPL-MCNC: 3.4 MG/DL — SIGNIFICANT CHANGE UP (ref 2.5–4.5)
PHOSPHATE SERPL-MCNC: 3.4 MG/DL — SIGNIFICANT CHANGE UP (ref 2.5–4.5)
PLATELET # BLD AUTO: 224 K/UL — SIGNIFICANT CHANGE UP (ref 150–400)
PLATELET # BLD AUTO: 224 K/UL — SIGNIFICANT CHANGE UP (ref 150–400)
POTASSIUM SERPL-MCNC: 4.4 MMOL/L — SIGNIFICANT CHANGE UP (ref 3.5–5.3)
POTASSIUM SERPL-MCNC: 4.4 MMOL/L — SIGNIFICANT CHANGE UP (ref 3.5–5.3)
POTASSIUM SERPL-SCNC: 4.4 MMOL/L — SIGNIFICANT CHANGE UP (ref 3.5–5.3)
POTASSIUM SERPL-SCNC: 4.4 MMOL/L — SIGNIFICANT CHANGE UP (ref 3.5–5.3)
RBC # BLD: 3.29 M/UL — LOW (ref 4.2–5.8)
RBC # BLD: 3.29 M/UL — LOW (ref 4.2–5.8)
RBC # FLD: 13.2 % — SIGNIFICANT CHANGE UP (ref 10.3–14.5)
RBC # FLD: 13.2 % — SIGNIFICANT CHANGE UP (ref 10.3–14.5)
SODIUM SERPL-SCNC: 133 MMOL/L — LOW (ref 135–145)
SODIUM SERPL-SCNC: 133 MMOL/L — LOW (ref 135–145)
SPECIMEN SOURCE: SIGNIFICANT CHANGE UP
WBC # BLD: 8.82 K/UL — SIGNIFICANT CHANGE UP (ref 3.8–10.5)
WBC # BLD: 8.82 K/UL — SIGNIFICANT CHANGE UP (ref 3.8–10.5)
WBC # FLD AUTO: 8.82 K/UL — SIGNIFICANT CHANGE UP (ref 3.8–10.5)
WBC # FLD AUTO: 8.82 K/UL — SIGNIFICANT CHANGE UP (ref 3.8–10.5)

## 2024-01-15 PROCEDURE — 99233 SBSQ HOSP IP/OBS HIGH 50: CPT

## 2024-01-15 RX ADMIN — ENOXAPARIN SODIUM 40 MILLIGRAM(S): 100 INJECTION SUBCUTANEOUS at 09:34

## 2024-01-15 RX ADMIN — Medication 4 MILLILITER(S): at 05:13

## 2024-01-15 RX ADMIN — CHLORHEXIDINE GLUCONATE 1 APPLICATION(S): 213 SOLUTION TOPICAL at 06:37

## 2024-01-15 RX ADMIN — Medication 4 MILLILITER(S): at 17:38

## 2024-01-15 RX ADMIN — Medication 1 MILLIGRAM(S): at 21:26

## 2024-01-15 NOTE — PROGRESS NOTE ADULT - ASSESSMENT
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 FOE: large ulcerative lesion R arytenoid/FVF obscuring view of R TVF but likely immobile, L nodular arytenoid mass, L TVF mobile. However, patient in no acute distress, non-stridulous, breathing comfortably on RA.    Plan:  - Tube feeds at goal  - Cuff deflated  - Continue TC  - Plan pending tumor board discussion   88M with history of known R sided laryngeal mass (known to Dr. 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 FOE: large ulcerative lesion R arytenoid/FVF obscuring view of R TVF but likely immobile, L nodular arytenoid mass, L TVF mobile. However, patient in no acute distress, non-stridulous, breathing comfortably on RA.    Plan:  - Tube feeds at goal  - Cuff deflated  - Continue TC  - Plan pending tumor board discussion   88M with history of known R sided laryngeal mass (known to Dr. 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 FOE: large ulcerative lesion R arytenoid/FVF obscuring view of R TVF but likely immobile, L nodular arytenoid mass, L TVF mobile. However, patient in no acute distress, non-stridulous, breathing comfortably on RA.    Plan:  - Tube feeds at goal  - Cuff deflated  - Continue TC  - Plan pending tumor board discussion

## 2024-01-15 NOTE — PROGRESS NOTE ADULT - SUBJECTIVE AND OBJECTIVE BOX
OTOLARYNGOLOGY (ENT) PROGRESS NOTE    PATIENT: KUSUM GROVE  MRN: [id number]  : 35  [id number]  DATE OF SERVICE:  01-15-24  			         ID: KUSUM GROVE is an 88M with history of known R sided laryngeal mass (known to Dr. 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 Patient lives and receives care in [facility]. He reports increased cough and mucous production associated with intermittent episodes of worsening shortness of breath for the past month. This has prompted ED visits for evaluation and discharge with steroid courses, which temporarily improved his breathing difficulty, however, patient also has glaucoma and has been advised by his ophthalmologist to avoid futures courses of steroids. He has had a hoarse, breathy voice for the past six months. Son in law reports significant weight loss over past few months. He is PEG-dependent and does not have any oral intake. Non-smoker, social alcohol use.      Subjective/ Interval:   : AFVSS on RA ON. Patient seen and evaluated this am. Pt with inspiratory stridor and increased secretions. Pt taken for a trach, DL with biopsy. Pt tolerated surgery well and was brought to SICU.  : AFVSS on TC. Patient seen and evaluated this am. Trach cuff deflated. Trach site dry. Mucous suctioned from trach.  : AFVSS on TC. Patient seen and evaluated this am doing well. Cuff remains deflated. Trach site dry. Some mucous suctioned from trach. Pt seen spitting up white-tinged mucous.  1/15: AFVSS on TC. Patient seen and evaluated this am doing well. Cuff remains deflated. Trach site dry. Some secretions suctioned from the trach site.      ALLERGIES:  penicillin (Rash)      MEDICATIONS:  Antiinfectives:     IV fluids:  dextrose 5%. 1000 milliLiter(s) IV Continuous <Continuous>  dextrose 5%. 1000 milliLiter(s) IV Continuous <Continuous>    Hematologic/Anticoagulation:  enoxaparin Injectable 40 milliGRAM(s) SubCutaneous every 24 hours    Pain medications/Neuro:  acetaminophen   Oral Liquid .. 650 milliGRAM(s) Enteral Tube every 6 hours PRN  ondansetron Injectable 4 milliGRAM(s) IV Push every 6 hours PRN  oxyCODONE    IR 2.5 milliGRAM(s) Oral every 6 hours PRN  oxyCODONE    IR 5 milliGRAM(s) Oral every 6 hours PRN    Endocrine Medications:   dextrose 50% Injectable 12.5 Gram(s) IV Push once  dextrose 50% Injectable 25 Gram(s) IV Push once  dextrose 50% Injectable 25 Gram(s) IV Push once  dextrose Oral Gel 15 Gram(s) Oral once PRN  glucagon  Injectable 1 milliGRAM(s) IntraMuscular once  insulin lispro (ADMELOG) corrective regimen sliding scale   SubCutaneous every 6 hours    All other standing medications:   acetylcysteine 20%  Inhalation 4 milliLiter(s) Inhalation two times a day  chlorhexidine 4% Liquid 1 Application(s) Topical <User Schedule>  doxazosin 1 milliGRAM(s) Oral at bedtime  influenza  Vaccine (HIGH DOSE) 0.7 milliLiter(s) IntraMuscular once  polyethylene glycol 3350 17 Gram(s) Oral every 24 hours    All other PRN medications:  albuterol    90 MICROgram(s) HFA Inhaler 2 Puff(s) Inhalation every 6 hours PRN    Vital Signs Last 24 Hrs  T(C): 37.2 (15 Ceferino 2024 10:00), Max: 37.2 (2024 14:00)  T(F): 98.9 (15 Ceferino 2024 10:00), Max: 99 (2024 14:00)  HR: 78 (15 Ceferino 2024 11:27) (62 - 80)  BP: 129/71 (15 Ceferino 2024 11:27) (119/62 - 143/62)  BP(mean): 94 (15 Ceferino 2024 11:27) (85 - 94)  RR: 18 (15 Ceferino 2024 11:27) (16 - 19)  SpO2: 99% (15 Ceferino 2024 11:27) (95% - 99%)    Parameters below as of 15 Ceferino 2024 11:27  Patient On (Oxygen Delivery Method): tracheostomy collar  O2 Flow (L/min): 10  O2 Concentration (%): 35      -14 @ 07:01  -  01-15 @ 07:00  --------------------------------------------------------  IN:    Enteral Tube Flush: 150 mL    Jevity 1.2: 900 mL  Total IN: 1050 mL    OUT:    Voided (mL): 1375 mL  Total OUT: 1375 mL    Total NET: -325 mL      01-15 @ 07:01  -  01-15 @ 12:14  --------------------------------------------------------  IN:  Total IN: 0 mL    OUT:    Voided (mL): 300 mL  Total OUT: 300 mL    Total NET: -300 mL                  PHYSICAL EXAM:  GEN: appears stated age, NAD  NEURO: alert & oriented x   HEENT: trach intact, scant secretions noted from trach and suctioned  CVS: regular rate and rhythm  Pulm: normal respiratory excursions, not tachypneic, no labored breathing  Abd: non-distended  Ext: moving all four extremities, no peripheral edema noted       LABS                       10.6   8.82  )-----------( 224      ( 15 Ceferino 2024 05:30 )             32.1    01-15    133<L>  |  97  |  21  ----------------------------<  107<H>  4.4   |  29  |  0.71    Ca    8.5      15 Ceferino 2024 05:30  Phos  3.4     01-15  Mg     1.8     01-15           Coagulation Studies-     Urinalysis Basic - ( 15 Ceferino 2024 05:30 )    Color: x / Appearance: x / SG: x / pH: x  Gluc: 107 mg/dL / Ketone: x  / Bili: x / Urobili: x   Blood: x / Protein: x / Nitrite: x   Leuk Esterase: x / RBC: x / WBC x   Sq Epi: x / Non Sq Epi: x / Bacteria: x      Endocrine Panel-  Calcium: 8.5 mg/dL (01-15 @ 05:30)     OTOLARYNGOLOGY (ENT) PROGRESS NOTE    PATIENT: KUSUM GROVE  MRN: [id number]  : 35  [id number]  DATE OF SERVICE:  01-15-24  			         ID: KUSUM GROVE is an 88M with history of known R sided laryngeal mass (known to Dr. 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 Patient lives and receives care in [facility]. He reports increased cough and mucous production associated with intermittent episodes of worsening shortness of breath for the past month. This has prompted ED visits for evaluation and discharge with steroid courses, which temporarily improved his breathing difficulty, however, patient also has glaucoma and has been advised by his ophthalmologist to avoid futures courses of steroids. He has had a hoarse, breathy voice for the past six months. Son in law reports significant weight loss over past few months. He is PEG-dependent and does not have any oral intake. Non-smoker, social alcohol use.      Subjective/ Interval:   : AFVSS on RA ON. Patient seen and evaluated this am. Pt with inspiratory stridor and increased secretions. Pt taken for a trach, DL with biopsy. Pt tolerated surgery well and was brought to SICU.  : AFVSS on TC. Patient seen and evaluated this am. Trach cuff deflated. Trach site dry. Mucous suctioned from trach.  : AFVSS on TC. Patient seen and evaluated this am doing well. Cuff remains deflated. Trach site dry. Some mucous suctioned from trach. Pt seen spitting up white-tinged mucous.  1/15: AFVSS on TC. Patient seen and evaluated this am doing well. Cuff remains deflated. Trach site dry. Some secretions suctioned from the trach site.      ALLERGIES:  penicillin (Rash)      MEDICATIONS:  Antiinfectives:     IV fluids:  dextrose 5%. 1000 milliLiter(s) IV Continuous <Continuous>  dextrose 5%. 1000 milliLiter(s) IV Continuous <Continuous>    Hematologic/Anticoagulation:  enoxaparin Injectable 40 milliGRAM(s) SubCutaneous every 24 hours    Pain medications/Neuro:  acetaminophen   Oral Liquid .. 650 milliGRAM(s) Enteral Tube every 6 hours PRN  ondansetron Injectable 4 milliGRAM(s) IV Push every 6 hours PRN  oxyCODONE    IR 2.5 milliGRAM(s) Oral every 6 hours PRN  oxyCODONE    IR 5 milliGRAM(s) Oral every 6 hours PRN    Endocrine Medications:   dextrose 50% Injectable 12.5 Gram(s) IV Push once  dextrose 50% Injectable 25 Gram(s) IV Push once  dextrose 50% Injectable 25 Gram(s) IV Push once  dextrose Oral Gel 15 Gram(s) Oral once PRN  glucagon  Injectable 1 milliGRAM(s) IntraMuscular once  insulin lispro (ADMELOG) corrective regimen sliding scale   SubCutaneous every 6 hours    All other standing medications:   acetylcysteine 20%  Inhalation 4 milliLiter(s) Inhalation two times a day  chlorhexidine 4% Liquid 1 Application(s) Topical <User Schedule>  doxazosin 1 milliGRAM(s) Oral at bedtime  influenza  Vaccine (HIGH DOSE) 0.7 milliLiter(s) IntraMuscular once  polyethylene glycol 3350 17 Gram(s) Oral every 24 hours    All other PRN medications:  albuterol    90 MICROgram(s) HFA Inhaler 2 Puff(s) Inhalation every 6 hours PRN    Vital Signs Last 24 Hrs  T(C): 37.2 (15 Ceferino 2024 10:00), Max: 37.2 (2024 14:00)  T(F): 98.9 (15 Ceferino 2024 10:00), Max: 99 (2024 14:00)  HR: 78 (15 Ceferino 2024 11:27) (62 - 80)  BP: 129/71 (15 Ceferino 2024 11:27) (119/62 - 143/62)  BP(mean): 94 (15 Ceferino 2024 11:27) (85 - 94)  RR: 18 (15 Ceferino 2024 11:27) (16 - 19)  SpO2: 99% (15 Ceferino 2024 11:27) (95% - 99%)    Parameters below as of 15 Ceferino 2024 11:27  Patient On (Oxygen Delivery Method): tracheostomy collar  O2 Flow (L/min): 10  O2 Concentration (%): 35      -14 @ 07:01  -  01-15 @ 07:00  --------------------------------------------------------  IN:    Enteral Tube Flush: 150 mL    Jevity 1.2: 900 mL  Total IN: 1050 mL    OUT:    Voided (mL): 1375 mL  Total OUT: 1375 mL    Total NET: -325 mL      01-15 @ 07:01  -  01-15 @ 12:14  --------------------------------------------------------  IN:  Total IN: 0 mL    OUT:    Voided (mL): 300 mL  Total OUT: 300 mL    Total NET: -300 mL                  PHYSICAL EXAM:  GEN: appears stated age, NAD  NEURO: alert & oriented x   HEENT: trach intact, scant secretions noted from trach and suctioned  CVS: regular rate and rhythm  Pulm: normal respiratory excursions, not tachypneic, no labored breathing  Abd: non-distended  Ext: moving all four extremities, no peripheral edema noted       LABS                       10.6   8.82  )-----------( 224      ( 15 Ceferino 2024 05:30 )             32.1    01-15    133<L>  |  97  |  21  ----------------------------<  107<H>  4.4   |  29  |  0.71    Ca    8.5      15 Ceferino 2024 05:30  Phos  3.4     01-15  Mg     1.8     01-15           Coagulation Studies-     Urinalysis Basic - ( 15 Ceferino 2024 05:30 )    Color: x / Appearance: x / SG: x / pH: x  Gluc: 107 mg/dL / Ketone: x  / Bili: x / Urobili: x   Blood: x / Protein: x / Nitrite: x   Leuk Esterase: x / RBC: x / WBC x   Sq Epi: x / Non Sq Epi: x / Bacteria: x      Endocrine Panel-  Calcium: 8.5 mg/dL (01-15 @ 05:30)     OTOLARYNGOLOGY (ENT) PROGRESS NOTE    PATIENT: KUSUM GROVE  MRN: [id number]  : 35  [id number]  DATE OF SERVICE:  01-15-24  			         ID: KUSUM GROVE is an 88M with history of known R sided laryngeal mass (known to Dr. 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 Patient lives and receives care in [facility]. He reports increased cough and mucous production associated with intermittent episodes of worsening shortness of breath for the past month. This has prompted ED visits for evaluation and discharge with steroid courses, which temporarily improved his breathing difficulty, however, patient also has glaucoma and has been advised by his ophthalmologist to avoid futures courses of steroids. He has had a hoarse, breathy voice for the past six months. Son in law reports significant weight loss over past few months. He is PEG-dependent and does not have any oral intake. Non-smoker, social alcohol use.      Subjective/ Interval:   : AFVSS on RA ON. Patient seen and evaluated this am. Pt with inspiratory stridor and increased secretions. Pt taken for a trach, DL with biopsy. Pt tolerated surgery well and was brought to SICU.  : AFVSS on TC. Patient seen and evaluated this am. Trach cuff deflated. Trach site dry. Mucous suctioned from trach.  : AFVSS on TC. Patient seen and evaluated this am doing well. Cuff remains deflated. Trach site dry. Some mucous suctioned from trach. Pt seen spitting up white-tinged mucous.  1/15: AFVSS on TC. Patient seen and evaluated this am doing well. Cuff remains deflated. Trach site dry. Some secretions suctioned from the trach site.      ALLERGIES:  penicillin (Rash)      MEDICATIONS:  Antiinfectives:     IV fluids:  dextrose 5%. 1000 milliLiter(s) IV Continuous <Continuous>  dextrose 5%. 1000 milliLiter(s) IV Continuous <Continuous>    Hematologic/Anticoagulation:  enoxaparin Injectable 40 milliGRAM(s) SubCutaneous every 24 hours    Pain medications/Neuro:  acetaminophen   Oral Liquid .. 650 milliGRAM(s) Enteral Tube every 6 hours PRN  ondansetron Injectable 4 milliGRAM(s) IV Push every 6 hours PRN  oxyCODONE    IR 2.5 milliGRAM(s) Oral every 6 hours PRN  oxyCODONE    IR 5 milliGRAM(s) Oral every 6 hours PRN    Endocrine Medications:   dextrose 50% Injectable 12.5 Gram(s) IV Push once  dextrose 50% Injectable 25 Gram(s) IV Push once  dextrose 50% Injectable 25 Gram(s) IV Push once  dextrose Oral Gel 15 Gram(s) Oral once PRN  glucagon  Injectable 1 milliGRAM(s) IntraMuscular once  insulin lispro (ADMELOG) corrective regimen sliding scale   SubCutaneous every 6 hours    All other standing medications:   acetylcysteine 20%  Inhalation 4 milliLiter(s) Inhalation two times a day  chlorhexidine 4% Liquid 1 Application(s) Topical <User Schedule>  doxazosin 1 milliGRAM(s) Oral at bedtime  influenza  Vaccine (HIGH DOSE) 0.7 milliLiter(s) IntraMuscular once  polyethylene glycol 3350 17 Gram(s) Oral every 24 hours    All other PRN medications:  albuterol    90 MICROgram(s) HFA Inhaler 2 Puff(s) Inhalation every 6 hours PRN    Vital Signs Last 24 Hrs  T(C): 37.2 (15 Ceferino 2024 10:00), Max: 37.2 (2024 14:00)  T(F): 98.9 (15 Ceferino 2024 10:00), Max: 99 (2024 14:00)  HR: 78 (15 Ceferino 2024 11:27) (62 - 80)  BP: 129/71 (15 Ceferino 2024 11:27) (119/62 - 143/62)  BP(mean): 94 (15 Ceferino 2024 11:27) (85 - 94)  RR: 18 (15 Ceferino 2024 11:27) (16 - 19)  SpO2: 99% (15 Ceferino 2024 11:27) (95% - 99%)    Parameters below as of 15 Ceferino 2024 11:27  Patient On (Oxygen Delivery Method): tracheostomy collar  O2 Flow (L/min): 10  O2 Concentration (%): 35      -14 @ 07:01  -  01-15 @ 07:00  --------------------------------------------------------  IN:    Enteral Tube Flush: 150 mL    Jevity 1.2: 900 mL  Total IN: 1050 mL    OUT:    Voided (mL): 1375 mL  Total OUT: 1375 mL    Total NET: -325 mL      01-15 @ 07:01  -  01-15 @ 12:14  --------------------------------------------------------  IN:  Total IN: 0 mL    OUT:    Voided (mL): 300 mL  Total OUT: 300 mL    Total NET: -300 mL                  PHYSICAL EXAM:  GEN: appears stated age, NAD  NEURO: alert & oriented x   HEENT: trach intact, scant secretions noted from trach and suctioned  CVS: regular rate and rhythm  Pulm: normal respiratory excursions, not tachypneic, no labored breathing  Abd: non-distended  Ext: moving all four extremities, no peripheral edema noted       LABS                       10.6   8.82  )-----------( 224      ( 15 Ceferino 2024 05:30 )             32.1    01-15    133<L>  |  97  |  21  ----------------------------<  107<H>  4.4   |  29  |  0.71    Ca    8.5      15 Ceferino 2024 05:30  Phos  3.4     01-15  Mg     1.8     01-15           Coagulation Studies-     Urinalysis Basic - ( 15 Ceferino 2024 05:30 )    Color: x / Appearance: x / SG: x / pH: x  Gluc: 107 mg/dL / Ketone: x  / Bili: x / Urobili: x   Blood: x / Protein: x / Nitrite: x   Leuk Esterase: x / RBC: x / WBC x   Sq Epi: x / Non Sq Epi: x / Bacteria: x      Endocrine Panel-  Calcium: 8.5 mg/dL (01-15 @ 05:30)

## 2024-01-16 LAB
ANION GAP SERPL CALC-SCNC: 8 MMOL/L — SIGNIFICANT CHANGE UP (ref 5–17)
BUN SERPL-MCNC: 20 MG/DL — SIGNIFICANT CHANGE UP (ref 7–23)
CALCIUM SERPL-MCNC: 9.1 MG/DL — SIGNIFICANT CHANGE UP (ref 8.4–10.5)
CHLORIDE SERPL-SCNC: 96 MMOL/L — SIGNIFICANT CHANGE UP (ref 96–108)
CO2 SERPL-SCNC: 31 MMOL/L — SIGNIFICANT CHANGE UP (ref 22–31)
CREAT SERPL-MCNC: 0.73 MG/DL — SIGNIFICANT CHANGE UP (ref 0.5–1.3)
EGFR: 88 ML/MIN/1.73M2 — SIGNIFICANT CHANGE UP
GLUCOSE BLDC GLUCOMTR-MCNC: 101 MG/DL — HIGH (ref 70–99)
GLUCOSE BLDC GLUCOMTR-MCNC: 103 MG/DL — HIGH (ref 70–99)
GLUCOSE BLDC GLUCOMTR-MCNC: 112 MG/DL — HIGH (ref 70–99)
GLUCOSE BLDC GLUCOMTR-MCNC: 117 MG/DL — HIGH (ref 70–99)
GLUCOSE BLDC GLUCOMTR-MCNC: 126 MG/DL — HIGH (ref 70–99)
GLUCOSE SERPL-MCNC: 102 MG/DL — HIGH (ref 70–99)
HCT VFR BLD CALC: 35.6 % — LOW (ref 39–50)
HGB BLD-MCNC: 11.6 G/DL — LOW (ref 13–17)
MAGNESIUM SERPL-MCNC: 2 MG/DL — SIGNIFICANT CHANGE UP (ref 1.6–2.6)
MCHC RBC-ENTMCNC: 31.8 PG — SIGNIFICANT CHANGE UP (ref 27–34)
MCHC RBC-ENTMCNC: 32.6 GM/DL — SIGNIFICANT CHANGE UP (ref 32–36)
MCV RBC AUTO: 97.5 FL — SIGNIFICANT CHANGE UP (ref 80–100)
NRBC # BLD: 0 /100 WBCS — SIGNIFICANT CHANGE UP (ref 0–0)
PHOSPHATE SERPL-MCNC: 3.2 MG/DL — SIGNIFICANT CHANGE UP (ref 2.5–4.5)
PLATELET # BLD AUTO: 264 K/UL — SIGNIFICANT CHANGE UP (ref 150–400)
POTASSIUM SERPL-MCNC: 5 MMOL/L — SIGNIFICANT CHANGE UP (ref 3.5–5.3)
POTASSIUM SERPL-SCNC: 5 MMOL/L — SIGNIFICANT CHANGE UP (ref 3.5–5.3)
RBC # BLD: 3.65 M/UL — LOW (ref 4.2–5.8)
RBC # FLD: 13.1 % — SIGNIFICANT CHANGE UP (ref 10.3–14.5)
SODIUM SERPL-SCNC: 135 MMOL/L — SIGNIFICANT CHANGE UP (ref 135–145)
SURGICAL PATHOLOGY STUDY: SIGNIFICANT CHANGE UP
SURGICAL PATHOLOGY STUDY: SIGNIFICANT CHANGE UP
WBC # BLD: 11.1 K/UL — HIGH (ref 3.8–10.5)
WBC # FLD AUTO: 11.1 K/UL — HIGH (ref 3.8–10.5)

## 2024-01-16 PROCEDURE — 99255 IP/OBS CONSLTJ NEW/EST HI 80: CPT

## 2024-01-16 PROCEDURE — 99233 SBSQ HOSP IP/OBS HIGH 50: CPT

## 2024-01-16 PROCEDURE — 71045 X-RAY EXAM CHEST 1 VIEW: CPT | Mod: 26

## 2024-01-16 RX ORDER — CEFEPIME 1 G/1
INJECTION, POWDER, FOR SOLUTION INTRAMUSCULAR; INTRAVENOUS
Refills: 0 | Status: DISCONTINUED | OUTPATIENT
Start: 2024-01-16 | End: 2024-01-20

## 2024-01-16 RX ORDER — CEFEPIME 1 G/1
2000 INJECTION, POWDER, FOR SOLUTION INTRAMUSCULAR; INTRAVENOUS EVERY 8 HOURS
Refills: 0 | Status: DISCONTINUED | OUTPATIENT
Start: 2024-01-16 | End: 2024-01-20

## 2024-01-16 RX ORDER — CEFEPIME 1 G/1
2000 INJECTION, POWDER, FOR SOLUTION INTRAMUSCULAR; INTRAVENOUS ONCE
Refills: 0 | Status: COMPLETED | OUTPATIENT
Start: 2024-01-16 | End: 2024-01-16

## 2024-01-16 RX ORDER — SODIUM CHLORIDE 9 MG/ML
3 INJECTION INTRAMUSCULAR; INTRAVENOUS; SUBCUTANEOUS EVERY 4 HOURS
Refills: 0 | Status: DISCONTINUED | OUTPATIENT
Start: 2024-01-16 | End: 2024-02-01

## 2024-01-16 RX ADMIN — SODIUM CHLORIDE 3 MILLILITER(S): 9 INJECTION INTRAMUSCULAR; INTRAVENOUS; SUBCUTANEOUS at 11:54

## 2024-01-16 RX ADMIN — Medication 1 MILLIGRAM(S): at 22:04

## 2024-01-16 RX ADMIN — CHLORHEXIDINE GLUCONATE 1 APPLICATION(S): 213 SOLUTION TOPICAL at 06:19

## 2024-01-16 RX ADMIN — SODIUM CHLORIDE 3 MILLILITER(S): 9 INJECTION INTRAMUSCULAR; INTRAVENOUS; SUBCUTANEOUS at 14:05

## 2024-01-16 RX ADMIN — ENOXAPARIN SODIUM 40 MILLIGRAM(S): 100 INJECTION SUBCUTANEOUS at 11:53

## 2024-01-16 RX ADMIN — SODIUM CHLORIDE 3 MILLILITER(S): 9 INJECTION INTRAMUSCULAR; INTRAVENOUS; SUBCUTANEOUS at 22:06

## 2024-01-16 RX ADMIN — CEFEPIME 100 MILLIGRAM(S): 1 INJECTION, POWDER, FOR SOLUTION INTRAMUSCULAR; INTRAVENOUS at 22:04

## 2024-01-16 RX ADMIN — Medication 4 MILLILITER(S): at 18:25

## 2024-01-16 RX ADMIN — Medication 4 MILLILITER(S): at 06:18

## 2024-01-16 RX ADMIN — CEFEPIME 100 MILLIGRAM(S): 1 INJECTION, POWDER, FOR SOLUTION INTRAMUSCULAR; INTRAVENOUS at 18:25

## 2024-01-16 NOTE — PROGRESS NOTE ADULT - SUBJECTIVE AND OBJECTIVE BOX
OTOLARYNGOLOGY (ENT) PROGRESS NOTE    PATIENT: KUSUM GROVE  MRN: [id number]  : 35  [id number]  DATE OF SERVICE:  24  			         ID: KUSUM GROVE is an 88M with history of known R sided laryngeal mass (known to Dr. 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 Patient lives and receives care in [facility]. He reports increased cough and mucous production associated with intermittent episodes of worsening shortness of breath for the past month. This has prompted ED visits for evaluation and discharge with steroid courses, which temporarily improved his breathing difficulty, however, patient also has glaucoma and has been advised by his ophthalmologist to avoid futures courses of steroids. He has had a hoarse, breathy voice for the past six months. Son in law reports significant weight loss over past few months. He is PEG-dependent and does not have any oral intake. Non-smoker, social alcohol use.      Subjective/ Interval:   : AFVSS on RA ON. Patient seen and evaluated this am. Pt with inspiratory stridor and increased secretions. Pt taken for a trach, DL with biopsy. Pt tolerated surgery well and was brought to SICU.  : AFVSS on TC. Patient seen and evaluated this am. Trach cuff deflated. Trach site dry. Mucous suctioned from trach.  : AFVSS on TC. Patient seen and evaluated this am doing well. Cuff remains deflated. Trach site dry. Some mucous suctioned from trach. Pt seen spitting up white-tinged mucous.  1/15: AFVSS on TC. Patient seen and evaluated this am doing well. Cuff remains deflated. Trach site dry. Some secretions suctioned from the trach site.  : patient seen this morning, trach sutured in place, continues to suction thick secretions, will start nebulized saline today     ALLERGIES:  penicillin (Rash)      MEDICATIONS:  Antiinfectives:     IV fluids:  dextrose 5%. 1000 milliLiter(s) IV Continuous <Continuous>  dextrose 5%. 1000 milliLiter(s) IV Continuous <Continuous>    Hematologic/Anticoagulation:  enoxaparin Injectable 40 milliGRAM(s) SubCutaneous every 24 hours    Pain medications/Neuro:  acetaminophen   Oral Liquid .. 650 milliGRAM(s) Enteral Tube every 6 hours PRN  ondansetron Injectable 4 milliGRAM(s) IV Push every 6 hours PRN  oxyCODONE    IR 2.5 milliGRAM(s) Oral every 6 hours PRN  oxyCODONE    IR 5 milliGRAM(s) Oral every 6 hours PRN    Endocrine Medications:   dextrose 50% Injectable 12.5 Gram(s) IV Push once  dextrose 50% Injectable 25 Gram(s) IV Push once  dextrose 50% Injectable 25 Gram(s) IV Push once  dextrose Oral Gel 15 Gram(s) Oral once PRN  glucagon  Injectable 1 milliGRAM(s) IntraMuscular once  insulin lispro (ADMELOG) corrective regimen sliding scale   SubCutaneous every 6 hours    All other standing medications:   acetylcysteine 20%  Inhalation 4 milliLiter(s) Inhalation two times a day  chlorhexidine 4% Liquid 1 Application(s) Topical <User Schedule>  doxazosin 1 milliGRAM(s) Oral at bedtime  influenza  Vaccine (HIGH DOSE) 0.7 milliLiter(s) IntraMuscular once  polyethylene glycol 3350 17 Gram(s) Oral every 24 hours    All other PRN medications:  albuterol    90 MICROgram(s) HFA Inhaler 2 Puff(s) Inhalation every 6 hours PRN    Vital Signs Last 24 Hrs  T(C): 36.8 (2024 04:24), Max: 37.2 (15 Ceferino 2024 10:00)  T(F): 98.3 (2024 04:24), Max: 98.9 (15 Ceferino 2024 10:00)  HR: 86 (2024 03:29) (76 - 86)  BP: 114/63 (2024 03:29) (114/63 - 146/65)  BP(mean): 82 (2024 03:29) (82 - 94)  RR: 18 (2024 03:29) (16 - 18)  SpO2: 98% (2024 03:29) (95% - 99%)    Parameters below as of 2024 03:29  Patient On (Oxygen Delivery Method): room air          -15 @ 07:01  -  01-16 @ 07:00  --------------------------------------------------------  IN:    Enteral Tube Flush: 300 mL    Jevity 1.2: 1200 mL  Total IN: 1500 mL    OUT:    Voided (mL): 1225 mL  Total OUT: 1225 mL    Total NET: 275 mL        PHYSICAL EXAM:  GEN: appears stated age, NAD  NEURO: alert & oriented x   HEENT: trach intact, scant secretions noted from trach and suctioned  CVS: regular rate and rhythm  Pulm: normal respiratory excursions, not tachypneic, no labored breathing on trach collar   Abd: non-distended  Ext: moving all four extremities, no peripheral edema noted         LABS                       10.6   8.82  )-----------( 224      ( 15 Ceferino 2024 05:30 )             32.1    01-15    133<L>  |  97  |  21  ----------------------------<  107<H>  4.4   |  29  |  0.71    Ca    8.5      15 Ceferino 2024 05:30  Phos  3.4     01-15  Mg     1.8     01-15           Coagulation Studies-     Urinalysis Basic - ( 15 Ceferino 2024 05:30 )    Color: x / Appearance: x / SG: x / pH: x  Gluc: 107 mg/dL / Ketone: x  / Bili: x / Urobili: x   Blood: x / Protein: x / Nitrite: x   Leuk Esterase: x / RBC: x / WBC x   Sq Epi: x / Non Sq Epi: x / Bacteria: x        MICROBIOLOGY:  Culture - Sputum (collected 01-15-24 @ 15:29)  Source: .Sputum Trachial Aspirate  Gram Stain (01-15-24 @ 22:28):    Few epithelial cells    Moderate White blood cells    Few-moderate Gram positive cocci in pairs    Few-moderate Gram Negative Rods    Rare Gram Positive Rods          Culture - Sputum (collected 01-15-24 @ 15:29)  Source: .Sputum Trachial Aspirate  Gram Stain (01-15-24 @ 22:28):    Few epithelial cells    Moderate White blood cells    Few-moderate Gram positive cocci in pairs    Few-moderate Gram Negative Rods    Rare Gram Positive Rods     OTOLARYNGOLOGY (ENT) PROGRESS NOTE    PATIENT: KUSUM GROVE  MRN: [id number]  : 35  [id number]  DATE OF SERVICE:  24  			         ID: KUSUM GROVE is an 88M with history of known R sided laryngeal mass (known to Dr. 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 Patient lives and receives care in [facility]. He reports increased cough and mucous production associated with intermittent episodes of worsening shortness of breath for the past month. This has prompted ED visits for evaluation and discharge with steroid courses, which temporarily improved his breathing difficulty, however, patient also has glaucoma and has been advised by his ophthalmologist to avoid futures courses of steroids. He has had a hoarse, breathy voice for the past six months. Son in law reports significant weight loss over past few months. He is PEG-dependent and does not have any oral intake. Non-smoker, social alcohol use.      Subjective/ Interval:   : AFVSS on RA ON. Patient seen and evaluated this am. Pt with inspiratory stridor and increased secretions. Pt taken for a trach, DL with biopsy. Pt tolerated surgery well and was brought to SICU.  : AFVSS on TC. Patient seen and evaluated this am. Trach cuff deflated. Trach site dry. Mucous suctioned from trach.  : AFVSS on TC. Patient seen and evaluated this am doing well. Cuff remains deflated. Trach site dry. Some mucous suctioned from trach. Pt seen spitting up white-tinged mucous.  1/15: AFVSS on TC. Patient seen and evaluated this am doing well. Cuff remains deflated. Trach site dry. Some secretions suctioned from the trach site.  : patient seen this morning, trach sutured in place, continues to suction thick secretions, will start nebulized saline today     ALLERGIES:  penicillin (Rash)      MEDICATIONS:  Antiinfectives:     IV fluids:  dextrose 5%. 1000 milliLiter(s) IV Continuous <Continuous>  dextrose 5%. 1000 milliLiter(s) IV Continuous <Continuous>    Hematologic/Anticoagulation:  enoxaparin Injectable 40 milliGRAM(s) SubCutaneous every 24 hours    Pain medications/Neuro:  acetaminophen   Oral Liquid .. 650 milliGRAM(s) Enteral Tube every 6 hours PRN  ondansetron Injectable 4 milliGRAM(s) IV Push every 6 hours PRN  oxyCODONE    IR 2.5 milliGRAM(s) Oral every 6 hours PRN  oxyCODONE    IR 5 milliGRAM(s) Oral every 6 hours PRN    Endocrine Medications:   dextrose 50% Injectable 12.5 Gram(s) IV Push once  dextrose 50% Injectable 25 Gram(s) IV Push once  dextrose 50% Injectable 25 Gram(s) IV Push once  dextrose Oral Gel 15 Gram(s) Oral once PRN  glucagon  Injectable 1 milliGRAM(s) IntraMuscular once  insulin lispro (ADMELOG) corrective regimen sliding scale   SubCutaneous every 6 hours    All other standing medications:   acetylcysteine 20%  Inhalation 4 milliLiter(s) Inhalation two times a day  chlorhexidine 4% Liquid 1 Application(s) Topical <User Schedule>  doxazosin 1 milliGRAM(s) Oral at bedtime  influenza  Vaccine (HIGH DOSE) 0.7 milliLiter(s) IntraMuscular once  polyethylene glycol 3350 17 Gram(s) Oral every 24 hours    All other PRN medications:  albuterol    90 MICROgram(s) HFA Inhaler 2 Puff(s) Inhalation every 6 hours PRN    Vital Signs Last 24 Hrs  T(C): 36.8 (2024 04:24), Max: 37.2 (15 Ceferino 2024 10:00)  T(F): 98.3 (2024 04:24), Max: 98.9 (15 Ceferino 2024 10:00)  HR: 86 (2024 03:29) (76 - 86)  BP: 114/63 (2024 03:29) (114/63 - 146/65)  BP(mean): 82 (2024 03:29) (82 - 94)  RR: 18 (2024 03:29) (16 - 18)  SpO2: 98% (2024 03:29) (95% - 99%)    Parameters below as of 2024 03:29  Patient On (Oxygen Delivery Method): room air          -15 @ 07:01  -  01-16 @ 07:00  --------------------------------------------------------  IN:    Enteral Tube Flush: 300 mL    Jevity 1.2: 1200 mL  Total IN: 1500 mL    OUT:    Voided (mL): 1225 mL  Total OUT: 1225 mL    Total NET: 275 mL        PHYSICAL EXAM:  GEN: appears stated age, NAD  NEURO: alert & oriented x   HEENT: trach intact, scant secretions noted from trach and suctioned  CVS: regular rate and rhythm  Pulm: normal respiratory excursions, not tachypneic, no labored breathing on trach collar   Abd: non-distended  Ext: moving all four extremities, no peripheral edema noted         LABS                       10.6   8.82  )-----------( 224      ( 15 Ceferino 2024 05:30 )             32.1    01-15    133<L>  |  97  |  21  ----------------------------<  107<H>  4.4   |  29  |  0.71    Ca    8.5      15 Ceferino 2024 05:30  Phos  3.4     01-15  Mg     1.8     01-15           Coagulation Studies-     Urinalysis Basic - ( 15 Ceferino 2024 05:30 )    Color: x / Appearance: x / SG: x / pH: x  Gluc: 107 mg/dL / Ketone: x  / Bili: x / Urobili: x   Blood: x / Protein: x / Nitrite: x   Leuk Esterase: x / RBC: x / WBC x   Sq Epi: x / Non Sq Epi: x / Bacteria: x        MICROBIOLOGY:  Culture - Sputum (collected 01-15-24 @ 15:29)  Source: .Sputum Trachial Aspirate  Gram Stain (01-15-24 @ 22:28):    Few epithelial cells    Moderate White blood cells    Few-moderate Gram positive cocci in pairs    Few-moderate Gram Negative Rods    Rare Gram Positive Rods          Culture - Sputum (collected 01-15-24 @ 15:29)  Source: .Sputum Trachial Aspirate  Gram Stain (01-15-24 @ 22:28):    Few epithelial cells    Moderate White blood cells    Few-moderate Gram positive cocci in pairs    Few-moderate Gram Negative Rods    Rare Gram Positive Rods

## 2024-01-16 NOTE — CONSULT NOTE ADULT - ASSESSMENT
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  Patient with thick mucus production with clear CXR/CT chest, trach culture growing PsA, c/f bacterial tracheal infection or bronchitis.    - start cefepime 2g IV q8h  - f/u PsA susceptibility       Team 2 will follow you.  Case d/w primary team.    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  Patient with thick mucus production with clear CXR/CT chest, trach culture growing PsA, c/f bacterial tracheal infection or bronchitis.    - start cefepime 2g IV q8h  - f/u PsA susceptibility       Team 2 will follow you.  Case d/w primary team.    Maia Bunch MD, MS  Infectious Disease attending  office phone [phone]  For any questions during evening/weekend/holiday, please page ID on call

## 2024-01-16 NOTE — PROGRESS NOTE ADULT - ASSESSMENT
Assessment and Plan:  KUSUM GROVE is a 88M with history of known R sided laryngeal mass (known to Dr. Crisostomo, biopsy R piriform sinus 5/2023 pos. for SCC, now s/p RT, PEG-dependent) presenting with worsening shortness of breath for past month and new imaging findings on CT scan from one week ago in [facility] concerning for new laryngeal mass. FOE: large ulcerative lesion R arytenoid/FVF obscuring view of R TVF but likely immobile, L nodular arytenoid mass, L TVF mobile. However, patient in no acute distress, non-stridulous, breathing comfortably on RA.    Plan:  -  f/u CXR in regards to sputum culture and thick secretions   - Family and daughter trach teaching   - Tube feeds at goal  - Cuff deflated  - Continue TC  - Plan pending tumor board discussion    [facility] at [phone] with any questions/concerns.    Nica Salinas PA-C  01-16-24 @ 07:41       Assessment and Plan:  KUSUM GROVE is a 88M with history of known R sided laryngeal mass (known to Dr. Crisostomo, biopsy R piriform sinus 5/2023 pos. for SCC, now s/p RT, PEG-dependent) presenting with worsening shortness of breath for past month and new imaging findings on CT scan from one week ago in [facility] concerning for new laryngeal mass. FOE: large ulcerative lesion R arytenoid/FVF obscuring view of R TVF but likely immobile, L nodular arytenoid mass, L TVF mobile. However, patient in no acute distress, non-stridulous, breathing comfortably on RA.    Plan:  -  f/u CXR in regards to sputum culture and thick secretions   - Family and daughter trach teaching   - Tube feeds at goal  - Cuff deflated  - Continue TC  - Plan pending tumor board discussion    [facility] at [phone] with any questions/concerns.    Nica Salinas PA-C  01-16-24 @ 07:41

## 2024-01-16 NOTE — CONSULT NOTE ADULT - SUBJECTIVE AND OBJECTIVE BOX
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  Patient lives in [facility] and gets cancer care there, but he comes to [facility] for treatment as well.  He was admitted in 5/2023 for dysphagia and was seen by Dr. Crisostomo, had biopsy which revealed SCC.  He received radiation and chemo in [facility] last year. He developed worsening SOB and productive cough for 2 weeks and was admitted to a hospital in [facility] in early January, found to have new mass and underwent biopsy - result pending.  He was told that he need trach but the procedure cannot be done at [facility].  Thus he came to [facility] to get trach.  He was admitted on 1/11 and underwent flex laryngoscope, which showed large ulcerative lesion on R arytenoid which is obstructive. He got trach on 1/12.  CT chest showed few 2mm pulmonary nodules likely postinflammatory.  Patient has frequent thick mucus production and cough and trach culture growing PsA.  ID was consulted for abx rec.  He denied fever/chills, n/v/d, abdominal pain.  Wife said patient had penicillin allergy (rash) 20 yrs ago but denied other systemic reaction.       Surgical History  History of Cataract Surgery  History of Hernia Repair  History of Total Knee Replacement Left    Social History  Caffeine use (V49.89) (Z78.9)  Lives in [place]  Non-smoker (V49.89) (Z78.9)  Social alcohol use (V49.89) (Z78.9) (11 Jan 2024 19:40)      PAST MEDICAL & SURGICAL HISTORY:  [place] (hard of hearing)      HTN (hypertension)      Glaucoma      Deep vein thrombosis (DVT)  left knee, 25 yrs ago      Dysphagia      S/P total knee replacement, left            Review of Systems:   Constitutional, eyes, ENT, cardiovascular, respiratory, gastrointestinal, genitourinary, integumentary, neurological, psychiatric and heme/lymph are otherwise negative other than noted above       ANTIBIOTICS:  MEDICATIONS  (STANDING):  acetylcysteine 20%  Inhalation 4 milliLiter(s) Inhalation two times a day  cefepime   IVPB      chlorhexidine 4% Liquid 1 Application(s) Topical <User Schedule>  dextrose 5%. 1000 milliLiter(s) (50 mL/Hr) IV Continuous <Continuous>  dextrose 5%. 1000 milliLiter(s) (100 mL/Hr) IV Continuous <Continuous>  dextrose 50% Injectable 25 Gram(s) IV Push once  dextrose 50% Injectable 25 Gram(s) IV Push once  dextrose 50% Injectable 12.5 Gram(s) IV Push once  doxazosin 1 milliGRAM(s) Oral at bedtime  enoxaparin Injectable 40 milliGRAM(s) SubCutaneous every 24 hours  glucagon  Injectable 1 milliGRAM(s) IntraMuscular once  influenza  Vaccine (HIGH DOSE) 0.7 milliLiter(s) IntraMuscular once  insulin lispro (ADMELOG) corrective regimen sliding scale   SubCutaneous every 6 hours  polyethylene glycol 3350 17 Gram(s) Oral every 24 hours  sodium chloride 0.9% for Nebulization 3 milliLiter(s) Nebulizer every 4 hours    MEDICATIONS  (PRN):  acetaminophen   Oral Liquid .. 650 milliGRAM(s) Enteral Tube every 6 hours PRN Mild Pain (1 - 3)  albuterol    90 MICROgram(s) HFA Inhaler 2 Puff(s) Inhalation every 6 hours PRN Shortness of Breath  dextrose Oral Gel 15 Gram(s) Oral once PRN Blood Glucose LESS THAN 70 milliGRAM(s)/deciliter  ondansetron Injectable 4 milliGRAM(s) IV Push every 6 hours PRN Nausea  oxyCODONE    IR 2.5 milliGRAM(s) Oral every 6 hours PRN Moderate Pain (4 - 6)  oxyCODONE    IR 5 milliGRAM(s) Oral every 6 hours PRN Severe Pain (7 - 10)      Allergies    penicillin (Rash)    Intolerances        SOCIAL HISTORY:  Denied toxic habits.  Lives with family, retired.       FAMILY HISTORY:   no FH leading to current infection    Vital Signs Last 24 Hrs  T(C): 37.1 (16 Jan 2024 14:00), Max: 37.1 (16 Jan 2024 14:00)  T(F): 98.7 (16 Jan 2024 14:00), Max: 98.7 (16 Jan 2024 14:00)  HR: 76 (16 Jan 2024 13:00) (76 - 86)  BP: 130/64 (16 Jan 2024 13:00) (114/63 - 146/65)  BP(mean): 91 (16 Jan 2024 13:00) (82 - 97)  RR: 17 (16 Jan 2024 13:00) (16 - 18)  SpO2: 99% (16 Jan 2024 13:00) (97% - 100%)    Parameters below as of 16 Jan 2024 13:00  Patient On (Oxygen Delivery Method): tracheostomy collar  O2 Flow (L/min): 10  O2 Concentration (%): 35    01-15-24 @ 07:01  -  01-16-24 @ 07:00  --------------------------------------------------------  IN: 1500 mL / OUT: 1225 mL / NET: 275 mL    01-16-24 @ 07:01  -  01-16-24 @ 15:18  --------------------------------------------------------  IN: 300 mL / OUT: 400 mL / NET: -100 mL        PHYSICAL EXAM:  Constitutional: alert, NAD  Eyes: the sclera and conjunctiva were normal.   ENT: the ears and nose were normal in appearance.   Neck: trach in place, thick yellow/greenish mucus coming out with frequent cough   Pulmonary: no respiratory distress and lungs were clear to auscultation bilaterally.   Heart: heart rate was normal and rhythm regular, normal S1 and S2  Vascular:. there was no peripheral edema  Abdomen: normal bowel sounds, soft, non-tender, PEG       LABS:                        11.6   11.10 )-----------( 264      ( 16 Jan 2024 12:20 )             35.6     01-16    135  |  96  |  20  ----------------------------<  102<H>  5.0   |  31  |  0.73    Ca    9.1      16 Jan 2024 12:20  Phos  3.2     01-16  Mg     2.0     01-16        Urinalysis Basic - ( 16 Jan 2024 12:20 )    Color: x / Appearance: x / SG: x / pH: x  Gluc: 102 mg/dL / Ketone: x  / Bili: x / Urobili: x   Blood: x / Protein: x / Nitrite: x   Leuk Esterase: x / RBC: x / WBC x   Sq Epi: x / Non Sq Epi: x / Bacteria: x        MICROBIOLOGY:    RADIOLOGY & ADDITIONAL STUDIES:  1/16/24 CXR  IMPRESSION:  No focal infiltrate or lung consolidation. No significant pleural   effusion. No pneumothorax. Tracheostomy. Unremarkable cardiac silhouette.   No acute bone abnormality.    CT chest 1/13/24  IMPRESSION:  Few 2 mm pulmonary nodules are likely postinflammatory. However,   continued attention on surveillance imaging is suggested in this patient   with laryngeal cancer.  No intrathoracic lymphadenopathy.    CT neck soft tissue 1/13/24  IMPRESSION:  Comparing to May 2023, large RIGHT supraglottic mass has contracted in   volume, with visible mass remaining along the false fold and small   nodularity along the true cord. Right arytenoid cartilage is now   deossified but the thyroid and cricoid cartilages appear preserved.   Fullness of the LEFT hypopharynx is new but favored to be post -RT   change. Tracheostomy in place without complication.  Possibility of viable residual or recurrent tumor must be assessed   clinically, and attention to left side to exclude new site of disease   versus post RT change. Repeat biopsy or PET-CT as necessary. 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  Patient lives in [facility] and gets cancer care there, but he comes to [facility] for treatment as well.  He was admitted in 5/2023 for dysphagia and was seen by Dr. Crisostomo, had biopsy which revealed SCC.  He received radiation and chemo in [facility] last year. He developed worsening SOB and productive cough for 2 weeks and was admitted to a hospital in [facility] in early January, found to have new mass and underwent biopsy - result pending.  He was told that he need trach but the procedure cannot be done at [facility].  Thus he came to [facility] to get trach.  He was admitted on 1/11 and underwent flex laryngoscope, which showed large ulcerative lesion on R arytenoid which is obstructive. He got trach on 1/12.  CT chest showed few 2mm pulmonary nodules likely postinflammatory.  Patient has frequent thick mucus production and cough and trach culture growing PsA.  ID was consulted for abx rec.  He denied fever/chills, n/v/d, abdominal pain.  Wife said patient had penicillin allergy (rash) 20 yrs ago but denied other systemic reaction.       Surgical History  History of Cataract Surgery  History of Hernia Repair  History of Total Knee Replacement Left    Social History  Caffeine use (V49.89) (Z78.9)  Lives in [place]  Non-smoker (V49.89) (Z78.9)  Social alcohol use (V49.89) (Z78.9) (11 Jan 2024 19:40)      PAST MEDICAL & SURGICAL HISTORY:  [place] (hard of hearing)      HTN (hypertension)      Glaucoma      Deep vein thrombosis (DVT)  left knee, 25 yrs ago      Dysphagia      S/P total knee replacement, left            Review of Systems:   Constitutional, eyes, ENT, cardiovascular, respiratory, gastrointestinal, genitourinary, integumentary, neurological, psychiatric and heme/lymph are otherwise negative other than noted above       ANTIBIOTICS:  MEDICATIONS  (STANDING):  acetylcysteine 20%  Inhalation 4 milliLiter(s) Inhalation two times a day  cefepime   IVPB      chlorhexidine 4% Liquid 1 Application(s) Topical <User Schedule>  dextrose 5%. 1000 milliLiter(s) (50 mL/Hr) IV Continuous <Continuous>  dextrose 5%. 1000 milliLiter(s) (100 mL/Hr) IV Continuous <Continuous>  dextrose 50% Injectable 25 Gram(s) IV Push once  dextrose 50% Injectable 25 Gram(s) IV Push once  dextrose 50% Injectable 12.5 Gram(s) IV Push once  doxazosin 1 milliGRAM(s) Oral at bedtime  enoxaparin Injectable 40 milliGRAM(s) SubCutaneous every 24 hours  glucagon  Injectable 1 milliGRAM(s) IntraMuscular once  influenza  Vaccine (HIGH DOSE) 0.7 milliLiter(s) IntraMuscular once  insulin lispro (ADMELOG) corrective regimen sliding scale   SubCutaneous every 6 hours  polyethylene glycol 3350 17 Gram(s) Oral every 24 hours  sodium chloride 0.9% for Nebulization 3 milliLiter(s) Nebulizer every 4 hours    MEDICATIONS  (PRN):  acetaminophen   Oral Liquid .. 650 milliGRAM(s) Enteral Tube every 6 hours PRN Mild Pain (1 - 3)  albuterol    90 MICROgram(s) HFA Inhaler 2 Puff(s) Inhalation every 6 hours PRN Shortness of Breath  dextrose Oral Gel 15 Gram(s) Oral once PRN Blood Glucose LESS THAN 70 milliGRAM(s)/deciliter  ondansetron Injectable 4 milliGRAM(s) IV Push every 6 hours PRN Nausea  oxyCODONE    IR 2.5 milliGRAM(s) Oral every 6 hours PRN Moderate Pain (4 - 6)  oxyCODONE    IR 5 milliGRAM(s) Oral every 6 hours PRN Severe Pain (7 - 10)      Allergies    penicillin (Rash)    Intolerances        SOCIAL HISTORY:  Denied toxic habits.  Lives with family, retired.       FAMILY HISTORY:   no FH leading to current infection    Vital Signs Last 24 Hrs  T(C): 37.1 (16 Jan 2024 14:00), Max: 37.1 (16 Jan 2024 14:00)  T(F): 98.7 (16 Jan 2024 14:00), Max: 98.7 (16 Jan 2024 14:00)  HR: 76 (16 Jan 2024 13:00) (76 - 86)  BP: 130/64 (16 Jan 2024 13:00) (114/63 - 146/65)  BP(mean): 91 (16 Jan 2024 13:00) (82 - 97)  RR: 17 (16 Jan 2024 13:00) (16 - 18)  SpO2: 99% (16 Jan 2024 13:00) (97% - 100%)    Parameters below as of 16 Jan 2024 13:00  Patient On (Oxygen Delivery Method): tracheostomy collar  O2 Flow (L/min): 10  O2 Concentration (%): 35    01-15-24 @ 07:01  -  01-16-24 @ 07:00  --------------------------------------------------------  IN: 1500 mL / OUT: 1225 mL / NET: 275 mL    01-16-24 @ 07:01  -  01-16-24 @ 15:18  --------------------------------------------------------  IN: 300 mL / OUT: 400 mL / NET: -100 mL        PHYSICAL EXAM:  Constitutional: alert, NAD  Eyes: the sclera and conjunctiva were normal.   ENT: the ears and nose were normal in appearance.   Neck: trach in place, thick yellow/greenish mucus coming out with frequent cough   Pulmonary: no respiratory distress and lungs were clear to auscultation bilaterally.   Heart: heart rate was normal and rhythm regular, normal S1 and S2  Vascular:. there was no peripheral edema  Abdomen: normal bowel sounds, soft, non-tender, PEG       LABS:                        11.6   11.10 )-----------( 264      ( 16 Jan 2024 12:20 )             35.6     01-16    135  |  96  |  20  ----------------------------<  102<H>  5.0   |  31  |  0.73    Ca    9.1      16 Jan 2024 12:20  Phos  3.2     01-16  Mg     2.0     01-16        Urinalysis Basic - ( 16 Jan 2024 12:20 )    Color: x / Appearance: x / SG: x / pH: x  Gluc: 102 mg/dL / Ketone: x  / Bili: x / Urobili: x   Blood: x / Protein: x / Nitrite: x   Leuk Esterase: x / RBC: x / WBC x   Sq Epi: x / Non Sq Epi: x / Bacteria: x        MICROBIOLOGY:    RADIOLOGY & ADDITIONAL STUDIES:  1/16/24 CXR  IMPRESSION:  No focal infiltrate or lung consolidation. No significant pleural   effusion. No pneumothorax. Tracheostomy. Unremarkable cardiac silhouette.   No acute bone abnormality.    CT chest 1/13/24  IMPRESSION:  Few 2 mm pulmonary nodules are likely postinflammatory. However,   continued attention on surveillance imaging is suggested in this patient   with laryngeal cancer.  No intrathoracic lymphadenopathy.    CT neck soft tissue 1/13/24  IMPRESSION:  Comparing to May 2023, large RIGHT supraglottic mass has contracted in   volume, with visible mass remaining along the false fold and small   nodularity along the true cord. Right arytenoid cartilage is now   deossified but the thyroid and cricoid cartilages appear preserved.   Fullness of the LEFT hypopharynx is new but favored to be post -RT   change. Tracheostomy in place without complication.  Possibility of viable residual or recurrent tumor must be assessed   clinically, and attention to left side to exclude new site of disease   versus post RT change. Repeat biopsy or PET-CT as necessary.

## 2024-01-17 LAB
-  AZTREONAM: SIGNIFICANT CHANGE UP
-  CEFEPIME: SIGNIFICANT CHANGE UP
-  CIPROFLOXACIN: SIGNIFICANT CHANGE UP
-  LEVOFLOXACIN: SIGNIFICANT CHANGE UP
-  MEROPENEM: SIGNIFICANT CHANGE UP
-  PIPERACILLIN/TAZOBACTAM: SIGNIFICANT CHANGE UP
-  TOBRAMYCIN: SIGNIFICANT CHANGE UP
ANION GAP SERPL CALC-SCNC: 10 MMOL/L — SIGNIFICANT CHANGE UP (ref 5–17)
BUN SERPL-MCNC: 20 MG/DL — SIGNIFICANT CHANGE UP (ref 7–23)
CALCIUM SERPL-MCNC: 9.1 MG/DL — SIGNIFICANT CHANGE UP (ref 8.4–10.5)
CHLORIDE SERPL-SCNC: 100 MMOL/L — SIGNIFICANT CHANGE UP (ref 96–108)
CO2 SERPL-SCNC: 28 MMOL/L — SIGNIFICANT CHANGE UP (ref 22–31)
CREAT SERPL-MCNC: 0.75 MG/DL — SIGNIFICANT CHANGE UP (ref 0.5–1.3)
CULTURE RESULTS: ABNORMAL
EGFR: 87 ML/MIN/1.73M2 — SIGNIFICANT CHANGE UP
GLUCOSE BLDC GLUCOMTR-MCNC: 136 MG/DL — HIGH (ref 70–99)
GLUCOSE BLDC GLUCOMTR-MCNC: 145 MG/DL — HIGH (ref 70–99)
GLUCOSE BLDC GLUCOMTR-MCNC: 261 MG/DL — HIGH (ref 70–99)
GLUCOSE BLDC GLUCOMTR-MCNC: 341 MG/DL — HIGH (ref 70–99)
GLUCOSE SERPL-MCNC: 145 MG/DL — HIGH (ref 70–99)
HCT VFR BLD CALC: 33.7 % — LOW (ref 39–50)
HGB BLD-MCNC: 11.1 G/DL — LOW (ref 13–17)
MAGNESIUM SERPL-MCNC: 1.9 MG/DL — SIGNIFICANT CHANGE UP (ref 1.6–2.6)
MCHC RBC-ENTMCNC: 32.1 PG — SIGNIFICANT CHANGE UP (ref 27–34)
MCHC RBC-ENTMCNC: 32.9 GM/DL — SIGNIFICANT CHANGE UP (ref 32–36)
MCV RBC AUTO: 97.4 FL — SIGNIFICANT CHANGE UP (ref 80–100)
METHOD TYPE: SIGNIFICANT CHANGE UP
METHOD TYPE: SIGNIFICANT CHANGE UP
NRBC # BLD: 0 /100 WBCS — SIGNIFICANT CHANGE UP (ref 0–0)
ORGANISM # SPEC MICROSCOPIC CNT: ABNORMAL
ORGANISM # SPEC MICROSCOPIC CNT: ABNORMAL
ORGANISM # SPEC MICROSCOPIC CNT: SIGNIFICANT CHANGE UP
PHOSPHATE SERPL-MCNC: 2.7 MG/DL — SIGNIFICANT CHANGE UP (ref 2.5–4.5)
PLATELET # BLD AUTO: 275 K/UL — SIGNIFICANT CHANGE UP (ref 150–400)
POTASSIUM SERPL-MCNC: 4.5 MMOL/L — SIGNIFICANT CHANGE UP (ref 3.5–5.3)
POTASSIUM SERPL-SCNC: 4.5 MMOL/L — SIGNIFICANT CHANGE UP (ref 3.5–5.3)
RBC # BLD: 3.46 M/UL — LOW (ref 4.2–5.8)
RBC # FLD: 13.2 % — SIGNIFICANT CHANGE UP (ref 10.3–14.5)
SODIUM SERPL-SCNC: 138 MMOL/L — SIGNIFICANT CHANGE UP (ref 135–145)
SPECIMEN SOURCE: SIGNIFICANT CHANGE UP
WBC # BLD: 8.13 K/UL — SIGNIFICANT CHANGE UP (ref 3.8–10.5)
WBC # FLD AUTO: 8.13 K/UL — SIGNIFICANT CHANGE UP (ref 3.8–10.5)

## 2024-01-17 PROCEDURE — 99232 SBSQ HOSP IP/OBS MODERATE 35: CPT

## 2024-01-17 PROCEDURE — 99233 SBSQ HOSP IP/OBS HIGH 50: CPT

## 2024-01-17 RX ADMIN — SODIUM CHLORIDE 3 MILLILITER(S): 9 INJECTION INTRAMUSCULAR; INTRAVENOUS; SUBCUTANEOUS at 15:13

## 2024-01-17 RX ADMIN — Medication 4 MILLILITER(S): at 17:20

## 2024-01-17 RX ADMIN — Medication 1 MILLIGRAM(S): at 22:06

## 2024-01-17 RX ADMIN — SODIUM CHLORIDE 3 MILLILITER(S): 9 INJECTION INTRAMUSCULAR; INTRAVENOUS; SUBCUTANEOUS at 02:12

## 2024-01-17 RX ADMIN — POLYETHYLENE GLYCOL 3350 17 GRAM(S): 17 POWDER, FOR SOLUTION ORAL at 11:52

## 2024-01-17 RX ADMIN — CEFEPIME 100 MILLIGRAM(S): 1 INJECTION, POWDER, FOR SOLUTION INTRAMUSCULAR; INTRAVENOUS at 15:14

## 2024-01-17 RX ADMIN — SODIUM CHLORIDE 3 MILLILITER(S): 9 INJECTION INTRAMUSCULAR; INTRAVENOUS; SUBCUTANEOUS at 11:51

## 2024-01-17 RX ADMIN — CEFEPIME 100 MILLIGRAM(S): 1 INJECTION, POWDER, FOR SOLUTION INTRAMUSCULAR; INTRAVENOUS at 22:05

## 2024-01-17 RX ADMIN — CHLORHEXIDINE GLUCONATE 1 APPLICATION(S): 213 SOLUTION TOPICAL at 07:10

## 2024-01-17 RX ADMIN — Medication 4 MILLILITER(S): at 05:53

## 2024-01-17 RX ADMIN — SODIUM CHLORIDE 3 MILLILITER(S): 9 INJECTION INTRAMUSCULAR; INTRAVENOUS; SUBCUTANEOUS at 05:53

## 2024-01-17 RX ADMIN — ENOXAPARIN SODIUM 40 MILLIGRAM(S): 100 INJECTION SUBCUTANEOUS at 11:51

## 2024-01-17 RX ADMIN — CEFEPIME 100 MILLIGRAM(S): 1 INJECTION, POWDER, FOR SOLUTION INTRAMUSCULAR; INTRAVENOUS at 05:52

## 2024-01-17 RX ADMIN — SODIUM CHLORIDE 3 MILLILITER(S): 9 INJECTION INTRAMUSCULAR; INTRAVENOUS; SUBCUTANEOUS at 22:05

## 2024-01-17 RX ADMIN — Medication 4: at 23:32

## 2024-01-17 RX ADMIN — SODIUM CHLORIDE 3 MILLILITER(S): 9 INJECTION INTRAMUSCULAR; INTRAVENOUS; SUBCUTANEOUS at 17:20

## 2024-01-17 RX ADMIN — Medication 3: at 17:20

## 2024-01-17 NOTE — PROGRESS NOTE ADULT - ASSESSMENT
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  Patient with thick mucus production with clear CXR/CT chest, trach culture growing PsA, c/f bacterial tracheal infection or bronchitis.  Now on cefepime, clinically improving.  While in-house, we can continue cefepime, and upon discharge, will switch to cipro since patient will need to return to [place].  Discussed with daughter and patient about potential side effects of Cipro (c.diff, tendinitis, aortitis etc) - patient will be on short term so I think overal risk should be low.      - cont cefepime 2g IV q8h while in-house  - upon discharge, switch to Cipro 500mg liquid PEG q12h   - duration of abx is 10 days (1/16 - 1/25)      Thank you for your consult.  Please re-consult us or call us with questions.  Case d/w primary team.    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  Patient with thick mucus production with clear CXR/CT chest, trach culture growing PsA, c/f bacterial tracheal infection or bronchitis.  Now on cefepime, clinically improving.  While in-house, we can continue cefepime, and upon discharge, will switch to Levo since patient will need to return to [place].  Discussed with daughter and patient about potential side effects of Levo (c.diff, tendinitis, aortitis etc) - patient will be on short term so I think overal risk should be low.  (Please note that Cipro liquid cannot be given via PEG since it sticks to the tube, but Levo is OK to give).    - cont cefepime 2g IV q8h while in-house  - upon discharge, switch to Levofloxacin 750mg liquid PEG q48h   - duration of abx is 10 days (1/16 - 1/25)      Thank you for your consult.  Please re-consult us or call us with questions.  Case d/w primary team.    Maia Bunch MD, MS  Infectious Disease attending  office phone [phone]  For any questions during evening/weekend/holiday, please page ID on call

## 2024-01-17 NOTE — PROGRESS NOTE ADULT - SUBJECTIVE AND OBJECTIVE BOX
INFECTIOUS DISEASES CONSULT FOLLOW-UP NOTE    INTERVAL HPI/OVERNIGHT EVENTS:  no event overnight  patient feels well, thinks spurum production and cough is somewhat better than yesterday  no SOB, n/v/d, abdominal pain     ROS:   Constitutional, eyes, ENT, cardiovascular, respiratory, gastrointestinal, genitourinary, integumentary, neurological, psychiatric and heme/lymph are otherwise negative other than noted above       ANTIBIOTICS/RELEVANT:    MEDICATIONS  (STANDING):  acetylcysteine 20%  Inhalation 4 milliLiter(s) Inhalation two times a day  cefepime   IVPB      cefepime   IVPB 2000 milliGRAM(s) IV Intermittent every 8 hours  chlorhexidine 4% Liquid 1 Application(s) Topical <User Schedule>  dextrose 5%. 1000 milliLiter(s) (100 mL/Hr) IV Continuous <Continuous>  dextrose 5%. 1000 milliLiter(s) (50 mL/Hr) IV Continuous <Continuous>  dextrose 50% Injectable 12.5 Gram(s) IV Push once  dextrose 50% Injectable 25 Gram(s) IV Push once  dextrose 50% Injectable 25 Gram(s) IV Push once  doxazosin 1 milliGRAM(s) Oral at bedtime  enoxaparin Injectable 40 milliGRAM(s) SubCutaneous every 24 hours  glucagon  Injectable 1 milliGRAM(s) IntraMuscular once  influenza  Vaccine (HIGH DOSE) 0.7 milliLiter(s) IntraMuscular once  insulin lispro (ADMELOG) corrective regimen sliding scale   SubCutaneous every 6 hours  polyethylene glycol 3350 17 Gram(s) Oral every 24 hours  sodium chloride 0.9% for Nebulization 3 milliLiter(s) Nebulizer every 4 hours    MEDICATIONS  (PRN):  acetaminophen   Oral Liquid .. 650 milliGRAM(s) Enteral Tube every 6 hours PRN Mild Pain (1 - 3)  albuterol    90 MICROgram(s) HFA Inhaler 2 Puff(s) Inhalation every 6 hours PRN Shortness of Breath  dextrose Oral Gel 15 Gram(s) Oral once PRN Blood Glucose LESS THAN 70 milliGRAM(s)/deciliter  ondansetron Injectable 4 milliGRAM(s) IV Push every 6 hours PRN Nausea  oxyCODONE    IR 2.5 milliGRAM(s) Oral every 6 hours PRN Moderate Pain (4 - 6)  oxyCODONE    IR 5 milliGRAM(s) Oral every 6 hours PRN Severe Pain (7 - 10)        Vital Signs Last 24 Hrs  T(C): 36.4 (17 Jan 2024 08:48), Max: 37.1 (16 Jan 2024 14:00)  T(F): 97.5 (17 Jan 2024 08:48), Max: 98.7 (16 Jan 2024 14:00)  HR: 86 (17 Jan 2024 08:52) (74 - 86)  BP: 139/71 (17 Jan 2024 08:52) (123/66 - 160/76)  BP(mean): 97 (17 Jan 2024 08:52) (88 - 109)  RR: 18 (17 Jan 2024 08:52) (17 - 19)  SpO2: 97% (17 Jan 2024 08:52) (97% - 100%)    Parameters below as of 17 Jan 2024 08:52  Patient On (Oxygen Delivery Method): tracheostomy collar  O2 Flow (L/min): 10  O2 Concentration (%): 35    01-16-24 @ 07:01  -  01-17-24 @ 07:00  --------------------------------------------------------  IN: 300 mL / OUT: 600 mL / NET: -300 mL      PHYSICAL EXAM:  Constitutional: alert, NAD  Eyes: the sclera and conjunctiva were normal.   ENT: the ears and nose were normal in appearance.   Neck: trach in place, thick yellow/greenish mucus coming out with frequent cough   Pulmonary: no respiratory distress and lungs were clear to auscultation bilaterally.   Heart: heart rate was normal and rhythm regular, normal S1 and S2  Vascular:. there was no peripheral edema  Abdomen: normal bowel sounds, soft, non-tender, PEG         LABS:                        11.1   8.13  )-----------( 275      ( 17 Jan 2024 05:30 )             33.7     01-17    138  |  100  |  20  ----------------------------<  145<H>  4.5   |  28  |  0.75    Ca    9.1      17 Jan 2024 05:30  Phos  2.7     01-17  Mg     1.9     01-17        Urinalysis Basic - ( 17 Jan 2024 05:30 )    Color: x / Appearance: x / SG: x / pH: x  Gluc: 145 mg/dL / Ketone: x  / Bili: x / Urobili: x   Blood: x / Protein: x / Nitrite: x   Leuk Esterase: x / RBC: x / WBC x   Sq Epi: x / Non Sq Epi: x / Bacteria: x        MICROBIOLOGY:  Trach 1/15/24 Trach aspirate: PsA (S aztreonam, cefepime, cipro, levo, zosyn, timbo)      RADIOLOGY & ADDITIONAL STUDIES:  1/16/24 CXR  IMPRESSION:  No focal infiltrate or lung consolidation. No significant pleural   effusion. No pneumothorax. Tracheostomy. Unremarkable cardiac silhouette.   No acute bone abnormality.    CT chest 1/13/24  IMPRESSION:  Few 2 mm pulmonary nodules are likely postinflammatory. However,   continued attention on surveillance imaging is suggested in this patient   with laryngeal cancer.  No intrathoracic lymphadenopathy.    CT neck soft tissue 1/13/24  IMPRESSION:  Comparing to May 2023, large RIGHT supraglottic mass has contracted in   volume, with visible mass remaining along the false fold and small   nodularity along the true cord. Right arytenoid cartilage is now   deossified but the thyroid and cricoid cartilages appear preserved.   Fullness of the LEFT hypopharynx is new but favored to be post -RT   change. Tracheostomy in place without complication.  Possibility of viable residual or recurrent tumor must be assessed   clinically, and attention to left side to exclude new site of disease   versus post RT change. Repeat biopsy or PET-CT as necessary.

## 2024-01-17 NOTE — PROGRESS NOTE ADULT - ASSESSMENT
Assessment and Plan:  KUSUM GROVE is a 88M with history of known R sided laryngeal mass (known to Dr. Crisostomo, biopsy R piriform sinus 5/2023 pos. for SCC, now s/p RT, PEG-dependent) presenting with worsening shortness of breath for past month and new imaging findings on CT scan from one week ago in [facility] concerning for new laryngeal mass. FOE: large ulcerative lesion R arytenoid/FVF obscuring view of R TVF but likely immobile, L nodular arytenoid mass, L TVF mobile. However, patient in no acute distress, non-stridulous, breathing comfortably on RA.    Plan:  - Change to cuffless thur/fri   - Family and daughter trach teaching   - Tube feeds at goal  - Cuff deflated  - Continue TC  - Plan pending tumor board discussion    [facility] at [phone] with any questions/concerns.    Nica Salinas PA-C  01-17-24 @ 07:45

## 2024-01-17 NOTE — PROGRESS NOTE ADULT - SUBJECTIVE AND OBJECTIVE BOX
OTOLARYNGOLOGY (ENT) PROGRESS NOTE    PATIENT: KUSUM GROVE  MRN: [id number]  : 35  [id number]  DATE OF SERVICE:  24  			         ID: KUSUM GROVE is an 88M with history of known R sided laryngeal mass (known to Dr. 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 Patient lives and receives care in [facility]. He reports increased cough and mucous production associated with intermittent episodes of worsening shortness of breath for the past month. This has prompted ED visits for evaluation and discharge with steroid courses, which temporarily improved his breathing difficulty, however, patient also has glaucoma and has been advised by his ophthalmologist to avoid futures courses of steroids. He has had a hoarse, breathy voice for the past six months. Son in law reports significant weight loss over past few months. He is PEG-dependent and does not have any oral intake. Non-smoker, social alcohol use.      Subjective/ Interval:   : AFVSS on RA ON. Patient seen and evaluated this am. Pt with inspiratory stridor and increased secretions. Pt taken for a trach, DL with biopsy. Pt tolerated surgery well and was brought to SICU.  : AFVSS on TC. Patient seen and evaluated this am. Trach cuff deflated. Trach site dry. Mucous suctioned from trach.  : AFVSS on TC. Patient seen and evaluated this am doing well. Cuff remains deflated. Trach site dry. Some mucous suctioned from trach. Pt seen spitting up white-tinged mucous.  1/15: AFVSS on TC. Patient seen and evaluated this am doing well. Cuff remains deflated. Trach site dry. Some secretions suctioned from the trach site.  : patient seen this morning, trach sutured in place, continues to suction thick secretions, will start nebulized saline today   : Patient seen this morning, trach in place with thick secretions, continue IV abx,  continue nebulized saline meds, continue trach teaching with family     ALLERGIES:  penicillin (Rash)      MEDICATIONS:  Antiinfectives:   cefepime   IVPB      cefepime   IVPB 2000 milliGRAM(s) IV Intermittent every 8 hours    IV fluids:  dextrose 5%. 1000 milliLiter(s) IV Continuous <Continuous>  dextrose 5%. 1000 milliLiter(s) IV Continuous <Continuous>    Hematologic/Anticoagulation:  enoxaparin Injectable 40 milliGRAM(s) SubCutaneous every 24 hours    Pain medications/Neuro:  acetaminophen   Oral Liquid .. 650 milliGRAM(s) Enteral Tube every 6 hours PRN  ondansetron Injectable 4 milliGRAM(s) IV Push every 6 hours PRN  oxyCODONE    IR 2.5 milliGRAM(s) Oral every 6 hours PRN  oxyCODONE    IR 5 milliGRAM(s) Oral every 6 hours PRN    Endocrine Medications:   dextrose 50% Injectable 12.5 Gram(s) IV Push once  dextrose 50% Injectable 25 Gram(s) IV Push once  dextrose 50% Injectable 25 Gram(s) IV Push once  dextrose Oral Gel 15 Gram(s) Oral once PRN  glucagon  Injectable 1 milliGRAM(s) IntraMuscular once  insulin lispro (ADMELOG) corrective regimen sliding scale   SubCutaneous every 6 hours    All other standing medications:   acetylcysteine 20%  Inhalation 4 milliLiter(s) Inhalation two times a day  chlorhexidine 4% Liquid 1 Application(s) Topical <User Schedule>  doxazosin 1 milliGRAM(s) Oral at bedtime  influenza  Vaccine (HIGH DOSE) 0.7 milliLiter(s) IntraMuscular once  polyethylene glycol 3350 17 Gram(s) Oral every 24 hours  sodium chloride 0.9% for Nebulization 3 milliLiter(s) Nebulizer every 4 hours    All other PRN medications:  albuterol    90 MICROgram(s) HFA Inhaler 2 Puff(s) Inhalation every 6 hours PRN    Vital Signs Last 24 Hrs  T(C): 36.9 (2024 05:00), Max: 37.1 (2024 14:00)  T(F): 98.4 (2024 05:00), Max: 98.7 (2024 14:00)  HR: 80 (2024 04:05) (74 - 84)  BP: 138/79 (2024 04:05) (123/66 - 160/76)  BP(mean): 103 (2024 04:05) (88 - 109)  RR: 18 (2024 04:05) (17 - 19)  SpO2: 100% (2024 04:05) (98% - 100%)    Parameters below as of 2024 04:05  Patient On (Oxygen Delivery Method): tracheostomy collar  O2 Flow (L/min): 10  O2 Concentration (%): 35       @ 07:01  -   @ 07:00  --------------------------------------------------------  IN:    Jevity 1.2: 300 mL  Total IN: 300 mL    OUT:    Voided (mL): 600 mL  Total OUT: 600 mL    Total NET: -300 mL      PHYSICAL EXAM:  GEN: appears stated age, NAD  NEURO: alert & oriented x   HEENT: trach intact - sutures in plac, scant secretions noted from trach and suctioned  CVS: regular rate and rhythm  Pulm: normal respiratory excursions, not tachypneic, no labored breathing on trach collar   Abd: non-distended  Ext: moving all four extremities, no peripheral edema noted           LABS                       11.6   11.10 )-----------( 264      ( 2024 12:20 )             35.6        135  |  96  |  20  ----------------------------<  102<H>  5.0   |  31  |  0.73    Ca    9.1      2024 12:20  Phos  3.2       Mg     2.0                Coagulation Studies-     Urinalysis Basic - ( 2024 12:20 )    Color: x / Appearance: x / SG: x / pH: x  Gluc: 102 mg/dL / Ketone: x  / Bili: x / Urobili: x   Blood: x / Protein: x / Nitrite: x   Leuk Esterase: x / RBC: x / WBC x   Sq Epi: x / Non Sq Epi: x / Bacteria: x      Endocrine Panel-  Calcium: 9.1 mg/dL ( @ 12:20)                MICROBIOLOGY:  Culture - Sputum (collected 01-15-24 @ 15:29)  Source: .Sputum Trachial Aspirate  Gram Stain (01-15-24 @ 22:28):    Few epithelial cells    Moderate White blood cells    Few-moderate Gram positive cocci in pairs    Few-moderate Gram Negative Rods    Rare Gram Positive Rods  Preliminary Report (24 @ 13:37):    Numerous Pseudomonas aeruginosa    Susceptibility to follow.    Culture in progress      Culture Results:   Numerous Pseudomonas aeruginosa  Susceptibility to follow.  Culture in progress (01-15-24 @ 15:29)      Culture - Sputum (collected 01-15-24 @ 15:29)  Source: .Sputum Trachial Aspirate  Gram Stain (01-15-24 @ 22:28):    Few epithelial cells    Moderate White blood cells    Few-moderate Gram positive cocci in pairs    Few-moderate Gram Negative Rods    Rare Gram Positive Rods  Preliminary Report (24 @ 13:37):    Numerous Pseudomonas aeruginosa    Susceptibility to follow.    Culture in progress        RADIOLOGY & ADDITIONAL STUDIES:

## 2024-01-18 LAB
ANION GAP SERPL CALC-SCNC: 6 MMOL/L — SIGNIFICANT CHANGE UP (ref 5–17)
BUN SERPL-MCNC: 28 MG/DL — HIGH (ref 7–23)
CALCIUM SERPL-MCNC: 9 MG/DL — SIGNIFICANT CHANGE UP (ref 8.4–10.5)
CHLORIDE SERPL-SCNC: 99 MMOL/L — SIGNIFICANT CHANGE UP (ref 96–108)
CO2 SERPL-SCNC: 31 MMOL/L — SIGNIFICANT CHANGE UP (ref 22–31)
CREAT SERPL-MCNC: 0.76 MG/DL — SIGNIFICANT CHANGE UP (ref 0.5–1.3)
EGFR: 86 ML/MIN/1.73M2 — SIGNIFICANT CHANGE UP
GLUCOSE BLDC GLUCOMTR-MCNC: 133 MG/DL — HIGH (ref 70–99)
GLUCOSE BLDC GLUCOMTR-MCNC: 149 MG/DL — HIGH (ref 70–99)
GLUCOSE BLDC GLUCOMTR-MCNC: 172 MG/DL — HIGH (ref 70–99)
GLUCOSE BLDC GLUCOMTR-MCNC: 209 MG/DL — HIGH (ref 70–99)
GLUCOSE SERPL-MCNC: 219 MG/DL — HIGH (ref 70–99)
HCT VFR BLD CALC: 33.1 % — LOW (ref 39–50)
HGB BLD-MCNC: 10.6 G/DL — LOW (ref 13–17)
MAGNESIUM SERPL-MCNC: 1.9 MG/DL — SIGNIFICANT CHANGE UP (ref 1.6–2.6)
MCHC RBC-ENTMCNC: 30.8 PG — SIGNIFICANT CHANGE UP (ref 27–34)
MCHC RBC-ENTMCNC: 32 GM/DL — SIGNIFICANT CHANGE UP (ref 32–36)
MCV RBC AUTO: 96.2 FL — SIGNIFICANT CHANGE UP (ref 80–100)
NRBC # BLD: 0 /100 WBCS — SIGNIFICANT CHANGE UP (ref 0–0)
PHOSPHATE SERPL-MCNC: 2.9 MG/DL — SIGNIFICANT CHANGE UP (ref 2.5–4.5)
PLATELET # BLD AUTO: 294 K/UL — SIGNIFICANT CHANGE UP (ref 150–400)
POTASSIUM SERPL-MCNC: 4.4 MMOL/L — SIGNIFICANT CHANGE UP (ref 3.5–5.3)
POTASSIUM SERPL-SCNC: 4.4 MMOL/L — SIGNIFICANT CHANGE UP (ref 3.5–5.3)
RBC # BLD: 3.44 M/UL — LOW (ref 4.2–5.8)
RBC # FLD: 13.1 % — SIGNIFICANT CHANGE UP (ref 10.3–14.5)
SODIUM SERPL-SCNC: 136 MMOL/L — SIGNIFICANT CHANGE UP (ref 135–145)
WBC # BLD: 7.12 K/UL — SIGNIFICANT CHANGE UP (ref 3.8–10.5)
WBC # FLD AUTO: 7.12 K/UL — SIGNIFICANT CHANGE UP (ref 3.8–10.5)

## 2024-01-18 PROCEDURE — 99233 SBSQ HOSP IP/OBS HIGH 50: CPT

## 2024-01-18 RX ORDER — DOXAZOSIN MESYLATE 4 MG
1 TABLET ORAL AT BEDTIME
Refills: 0 | Status: DISCONTINUED | OUTPATIENT
Start: 2024-01-18 | End: 2024-02-01

## 2024-01-18 RX ADMIN — CHLORHEXIDINE GLUCONATE 1 APPLICATION(S): 213 SOLUTION TOPICAL at 06:07

## 2024-01-18 RX ADMIN — Medication 4 MILLILITER(S): at 17:47

## 2024-01-18 RX ADMIN — SODIUM CHLORIDE 3 MILLILITER(S): 9 INJECTION INTRAMUSCULAR; INTRAVENOUS; SUBCUTANEOUS at 14:07

## 2024-01-18 RX ADMIN — SODIUM CHLORIDE 3 MILLILITER(S): 9 INJECTION INTRAMUSCULAR; INTRAVENOUS; SUBCUTANEOUS at 17:47

## 2024-01-18 RX ADMIN — SODIUM CHLORIDE 3 MILLILITER(S): 9 INJECTION INTRAMUSCULAR; INTRAVENOUS; SUBCUTANEOUS at 23:02

## 2024-01-18 RX ADMIN — ENOXAPARIN SODIUM 40 MILLIGRAM(S): 100 INJECTION SUBCUTANEOUS at 09:55

## 2024-01-18 RX ADMIN — CEFEPIME 100 MILLIGRAM(S): 1 INJECTION, POWDER, FOR SOLUTION INTRAMUSCULAR; INTRAVENOUS at 23:03

## 2024-01-18 RX ADMIN — Medication 2: at 18:11

## 2024-01-18 RX ADMIN — SODIUM CHLORIDE 3 MILLILITER(S): 9 INJECTION INTRAMUSCULAR; INTRAVENOUS; SUBCUTANEOUS at 09:55

## 2024-01-18 RX ADMIN — Medication 4 MILLILITER(S): at 06:04

## 2024-01-18 RX ADMIN — SODIUM CHLORIDE 3 MILLILITER(S): 9 INJECTION INTRAMUSCULAR; INTRAVENOUS; SUBCUTANEOUS at 06:06

## 2024-01-18 RX ADMIN — CEFEPIME 100 MILLIGRAM(S): 1 INJECTION, POWDER, FOR SOLUTION INTRAMUSCULAR; INTRAVENOUS at 14:07

## 2024-01-18 RX ADMIN — POLYETHYLENE GLYCOL 3350 17 GRAM(S): 17 POWDER, FOR SOLUTION ORAL at 11:34

## 2024-01-18 RX ADMIN — CEFEPIME 100 MILLIGRAM(S): 1 INJECTION, POWDER, FOR SOLUTION INTRAMUSCULAR; INTRAVENOUS at 06:04

## 2024-01-18 RX ADMIN — Medication 1 MILLIGRAM(S): at 23:07

## 2024-01-18 NOTE — PROGRESS NOTE ADULT - ASSESSMENT
Assessment and Plan:  KUSUM GROVE is a 88M with history of known R sided laryngeal mass (known to Dr. Crisostomo, biopsy R piriform sinus 5/2023 pos. for SCC, now s/p RT, PEG-dependent) presenting with worsening shortness of breath for past month and new imaging findings on CT scan from one week ago in [facility] concerning for new laryngeal mass. FOE: large ulcerative lesion R arytenoid/FVF obscuring view of R TVF but likely immobile, L nodular arytenoid mass, L TVF mobile. s/p tracheostomy.     Plan:  - Family and daughter trach teaching   - Tube feeds at goal  - Cuff deflated  - Continue TC  - Plan pending tumor board discussion Monday     [facility] at [phone] with any questions/concerns.    Nica Salinas PA-C  01-18-24 @ 07:55

## 2024-01-18 NOTE — PROGRESS NOTE ADULT - SUBJECTIVE AND OBJECTIVE BOX
OTOLARYNGOLOGY (ENT) PROGRESS NOTE    PATIENT: KUSUM GROVE  MRN: [id number]  : 35  [id number]  DATE OF SERVICE:  24  			         ID: KUSUM GROVE is an 88M with history of known R sided laryngeal mass (known to Dr. 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 Patient lives and receives care in [facility]. He reports increased cough and mucous production associated with intermittent episodes of worsening shortness of breath for the past month. This has prompted ED visits for evaluation and discharge with steroid courses, which temporarily improved his breathing difficulty, however, patient also has glaucoma and has been advised by his ophthalmologist to avoid futures courses of steroids. He has had a hoarse, breathy voice for the past six months. Son in law reports significant weight loss over past few months. He is PEG-dependent and does not have any oral intake. Non-smoker, social alcohol use.      Subjective/ Interval:   : AFVSS on RA ON. Patient seen and evaluated this am. Pt with inspiratory stridor and increased secretions. Pt taken for a trach, DL with biopsy. Pt tolerated surgery well and was brought to SICU.  : AFVSS on TC. Patient seen and evaluated this am. Trach cuff deflated. Trach site dry. Mucous suctioned from trach.  : AFVSS on TC. Patient seen and evaluated this am doing well. Cuff remains deflated. Trach site dry. Some mucous suctioned from trach. Pt seen spitting up white-tinged mucous.  1/15: AFVSS on TC. Patient seen and evaluated this am doing well. Cuff remains deflated. Trach site dry. Some secretions suctioned from the trach site.  : patient seen this morning, trach sutured in place, continues to suction thick secretions, will start nebulized saline today   : Patient seen this morning, trach in place with thick secretions, continue IV abx,  continue nebulized saline meds, continue trach teaching with family   : Patient seen this morning,  trach in place.  continue teaching pending conversation with tumor board Monday, pain controlled, ambulating well     ALLERGIES:  penicillin (Rash)      MEDICATIONS:  Antiinfectives:   cefepime   IVPB      cefepime   IVPB 2000 milliGRAM(s) IV Intermittent every 8 hours    IV fluids:  dextrose 5%. 1000 milliLiter(s) IV Continuous <Continuous>  dextrose 5%. 1000 milliLiter(s) IV Continuous <Continuous>    Hematologic/Anticoagulation:  enoxaparin Injectable 40 milliGRAM(s) SubCutaneous every 24 hours    Pain medications/Neuro:  acetaminophen   Oral Liquid .. 650 milliGRAM(s) Enteral Tube every 6 hours PRN  ondansetron Injectable 4 milliGRAM(s) IV Push every 6 hours PRN  oxyCODONE    IR 5 milliGRAM(s) Oral every 6 hours PRN  oxyCODONE    IR 2.5 milliGRAM(s) Oral every 6 hours PRN    Endocrine Medications:   dextrose 50% Injectable 25 Gram(s) IV Push once  dextrose 50% Injectable 25 Gram(s) IV Push once  dextrose 50% Injectable 12.5 Gram(s) IV Push once  dextrose Oral Gel 15 Gram(s) Oral once PRN  glucagon  Injectable 1 milliGRAM(s) IntraMuscular once  insulin lispro (ADMELOG) corrective regimen sliding scale   SubCutaneous every 6 hours    All other standing medications:   acetylcysteine 20%  Inhalation 4 milliLiter(s) Inhalation two times a day  chlorhexidine 4% Liquid 1 Application(s) Topical <User Schedule>  doxazosin 1 milliGRAM(s) Oral at bedtime  influenza  Vaccine (HIGH DOSE) 0.7 milliLiter(s) IntraMuscular once  polyethylene glycol 3350 17 Gram(s) Oral every 24 hours  sodium chloride 0.9% for Nebulization 3 milliLiter(s) Nebulizer every 4 hours    All other PRN medications:  albuterol    90 MICROgram(s) HFA Inhaler 2 Puff(s) Inhalation every 6 hours PRN    Vital Signs Last 24 Hrs  T(C): 37.4 (2024 05:00), Max: 37.4 (2024 13:50)  T(F): 99.4 (2024 05:00), Max: 99.4 (2024 05:00)  HR: 91 (2024 06:45) (72 - 91)  BP: 135/64 (2024 04:06) (114/59 - 139/71)  BP(mean): 92 (2024 04:06) (80 - 97)  RR: 14 (2024 06:45) (14 - 19)  SpO2: 95% (2024 06:45) (95% - 100%)    Parameters below as of 2024 06:45  Patient On (Oxygen Delivery Method): tracheostomy collar  O2 Flow (L/min): 10  O2 Concentration (%): 35       @ 07:01  -  - @ 07:00  --------------------------------------------------------  IN:    Enteral Tube Flush: 50 mL    Jevity 1.2: 1424 mL  Total IN: 1474 mL    OUT:    Voided (mL): 1100 mL  Total OUT: 1100 mL    Total NET: 374 mL       @ 07:01  -  - @ 07:55  --------------------------------------------------------  IN:    Enteral Tube Flush: 50 mL    Jevity 1.2: 474 mL  Total IN: 524 mL    OUT:  Total OUT: 0 mL    Total NET: 524 mL      PHYSICAL EXAM:  GEN: appears stated age, NAD  NEURO: alert & oriented x 4/4  HEENT: trach intact - sutures in place, mild yellow secretions noted from trach and suctioned  CVS: regular rate and rhythm  Pulm: normal respiratory excursions, not tachypneic, no labored breathing on trach collar   Abd: non-distended  Ext: moving all four extremities, no peripheral edema noted         LABS                       11.1   8.13  )-----------( 275      ( 2024 05:30 )             33.7        138  |  100  |  20  ----------------------------<  145<H>  4.5   |  28  |  0.75    Ca    9.1      2024 05:30  Phos  2.7       Mg     1.9            Urinalysis Basic - ( 2024 05:30 )    Color: x / Appearance: x / SG: x / pH: x  Gluc: 145 mg/dL / Ketone: x  / Bili: x / Urobili: x   Blood: x / Protein: x / Nitrite: x   Leuk Esterase: x / RBC: x / WBC x   Sq Epi: x / Non Sq Epi: x / Bacteria: x        MICROBIOLOGY:  Culture - Sputum (collected 01-15-24 @ 15:29)  Source: .Sputum Trachial Aspirate  Gram Stain (01-15-24 @ 22:28):    Few epithelial cells    Moderate White blood cells    Few-moderate Gram positive cocci in pairs    Few-moderate Gram Negative Rods    Rare Gram Positive Rods  Final Report (24 @ 08:33):    Numerous Pseudomonas aeruginosa    Accompanied by normal respiratory jose manuel  Organism: Pseudomonas aeruginosa  Pseudomonas aeruginosa (24 @ 08:33)  Organism: Pseudomonas aeruginosa (24 @ 08:33)      Method Type: KB      -  Tobramycin: S  Organism: Pseudomonas aeruginosa (24 @ 08:33)      Method Type: LATOYA      -  Aztreonam: S <=4      -  Cefepime: S <=2      -  Ciprofloxacin: S <=0.25      -  Levofloxacin: S <=0.5      -  Piperacillin/Tazobactam: S <=8      -  Meropenem: S <=1      Culture Results:   Numerous Pseudomonas aeruginosa  Accompanied by normal respiratory jose manuel (01-15-24 @ 15:29)      Culture - Sputum (collected 01-15-24 @ 15:29)  Source: .Sputum Trachial Aspirate  Gram Stain (01-15-24 @ 22:28):    Few epithelial cells    Moderate White blood cells    Few-moderate Gram positive cocci in pairs    Few-moderate Gram Negative Rods    Rare Gram Positive Rods  Final Report (24 @ 08:33):    Numerous Pseudomonas aeruginosa    Accompanied by normal respiratory jose manuel  Organism: Pseudomonas aeruginosa  Pseudomonas aeruginosa (24 @ 08:33)  Organism: Pseudomonas aeruginosa (24 @ 08:33)      Method Type: KB      -  Tobramycin: S  Organism: Pseudomonas aeruginosa (24 @ 08:33)      Method Type: LATOYA      -  Aztreonam: S <=4      -  Cefepime: S <=2      -  Ciprofloxacin: S <=0.25      -  Levofloxacin: S <=0.5      -  Piperacillin/Tazobactam: S <=8      -  Meropenem: S <=1

## 2024-01-19 LAB
ANION GAP SERPL CALC-SCNC: 7 MMOL/L — SIGNIFICANT CHANGE UP (ref 5–17)
BUN SERPL-MCNC: 28 MG/DL — HIGH (ref 7–23)
CALCIUM SERPL-MCNC: 9.2 MG/DL — SIGNIFICANT CHANGE UP (ref 8.4–10.5)
CHLORIDE SERPL-SCNC: 100 MMOL/L — SIGNIFICANT CHANGE UP (ref 96–108)
CO2 SERPL-SCNC: 32 MMOL/L — HIGH (ref 22–31)
CREAT SERPL-MCNC: 0.77 MG/DL — SIGNIFICANT CHANGE UP (ref 0.5–1.3)
EGFR: 86 ML/MIN/1.73M2 — SIGNIFICANT CHANGE UP
GLUCOSE BLDC GLUCOMTR-MCNC: 131 MG/DL — HIGH (ref 70–99)
GLUCOSE BLDC GLUCOMTR-MCNC: 161 MG/DL — HIGH (ref 70–99)
GLUCOSE BLDC GLUCOMTR-MCNC: 203 MG/DL — HIGH (ref 70–99)
GLUCOSE SERPL-MCNC: 153 MG/DL — HIGH (ref 70–99)
HCT VFR BLD CALC: 31.3 % — LOW (ref 39–50)
HGB BLD-MCNC: 10.1 G/DL — LOW (ref 13–17)
MAGNESIUM SERPL-MCNC: 2.1 MG/DL — SIGNIFICANT CHANGE UP (ref 1.6–2.6)
MCHC RBC-ENTMCNC: 31.4 PG — SIGNIFICANT CHANGE UP (ref 27–34)
MCHC RBC-ENTMCNC: 32.3 GM/DL — SIGNIFICANT CHANGE UP (ref 32–36)
MCV RBC AUTO: 97.2 FL — SIGNIFICANT CHANGE UP (ref 80–100)
NRBC # BLD: 0 /100 WBCS — SIGNIFICANT CHANGE UP (ref 0–0)
PHOSPHATE SERPL-MCNC: 2.5 MG/DL — SIGNIFICANT CHANGE UP (ref 2.5–4.5)
PLATELET # BLD AUTO: 289 K/UL — SIGNIFICANT CHANGE UP (ref 150–400)
POTASSIUM SERPL-MCNC: 4.3 MMOL/L — SIGNIFICANT CHANGE UP (ref 3.5–5.3)
POTASSIUM SERPL-SCNC: 4.3 MMOL/L — SIGNIFICANT CHANGE UP (ref 3.5–5.3)
RBC # BLD: 3.22 M/UL — LOW (ref 4.2–5.8)
RBC # FLD: 13.1 % — SIGNIFICANT CHANGE UP (ref 10.3–14.5)
SODIUM SERPL-SCNC: 139 MMOL/L — SIGNIFICANT CHANGE UP (ref 135–145)
WBC # BLD: 6.84 K/UL — SIGNIFICANT CHANGE UP (ref 3.8–10.5)
WBC # FLD AUTO: 6.84 K/UL — SIGNIFICANT CHANGE UP (ref 3.8–10.5)

## 2024-01-19 PROCEDURE — 31502 CHANGE OF WINDPIPE AIRWAY: CPT

## 2024-01-19 PROCEDURE — 99233 SBSQ HOSP IP/OBS HIGH 50: CPT | Mod: 25

## 2024-01-19 RX ADMIN — Medication 1: at 06:25

## 2024-01-19 RX ADMIN — SODIUM CHLORIDE 3 MILLILITER(S): 9 INJECTION INTRAMUSCULAR; INTRAVENOUS; SUBCUTANEOUS at 11:12

## 2024-01-19 RX ADMIN — SODIUM CHLORIDE 3 MILLILITER(S): 9 INJECTION INTRAMUSCULAR; INTRAVENOUS; SUBCUTANEOUS at 13:35

## 2024-01-19 RX ADMIN — CEFEPIME 100 MILLIGRAM(S): 1 INJECTION, POWDER, FOR SOLUTION INTRAMUSCULAR; INTRAVENOUS at 05:22

## 2024-01-19 RX ADMIN — SODIUM CHLORIDE 3 MILLILITER(S): 9 INJECTION INTRAMUSCULAR; INTRAVENOUS; SUBCUTANEOUS at 21:54

## 2024-01-19 RX ADMIN — Medication 4 MILLILITER(S): at 17:38

## 2024-01-19 RX ADMIN — SODIUM CHLORIDE 3 MILLILITER(S): 9 INJECTION INTRAMUSCULAR; INTRAVENOUS; SUBCUTANEOUS at 05:23

## 2024-01-19 RX ADMIN — Medication 2: at 17:39

## 2024-01-19 RX ADMIN — Medication 1 MILLIGRAM(S): at 21:54

## 2024-01-19 RX ADMIN — Medication 4 MILLILITER(S): at 05:23

## 2024-01-19 RX ADMIN — CEFEPIME 100 MILLIGRAM(S): 1 INJECTION, POWDER, FOR SOLUTION INTRAMUSCULAR; INTRAVENOUS at 13:35

## 2024-01-19 RX ADMIN — CEFEPIME 100 MILLIGRAM(S): 1 INJECTION, POWDER, FOR SOLUTION INTRAMUSCULAR; INTRAVENOUS at 21:54

## 2024-01-19 RX ADMIN — CHLORHEXIDINE GLUCONATE 1 APPLICATION(S): 213 SOLUTION TOPICAL at 06:33

## 2024-01-19 RX ADMIN — Medication 1: at 00:03

## 2024-01-19 RX ADMIN — SODIUM CHLORIDE 3 MILLILITER(S): 9 INJECTION INTRAMUSCULAR; INTRAVENOUS; SUBCUTANEOUS at 02:04

## 2024-01-19 RX ADMIN — ENOXAPARIN SODIUM 40 MILLIGRAM(S): 100 INJECTION SUBCUTANEOUS at 11:12

## 2024-01-19 RX ADMIN — SODIUM CHLORIDE 3 MILLILITER(S): 9 INJECTION INTRAMUSCULAR; INTRAVENOUS; SUBCUTANEOUS at 17:38

## 2024-01-19 NOTE — PROGRESS NOTE ADULT - SUBJECTIVE AND OBJECTIVE BOX
OTOLARYNGOLOGY (ENT) PROGRESS NOTE    PATIENT: KUSUM GROVE  MRN: [id number]  : 35  [id number]  DATE OF SERVICE:  24  			         			         ID: KUSUM GROVE is an 88M with history of known R sided laryngeal mass (known to Dr. 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 Patient lives and receives care in [facility]. He reports increased cough and mucous production associated with intermittent episodes of worsening shortness of breath for the past month. This has prompted ED visits for evaluation and discharge with steroid courses, which temporarily improved his breathing difficulty, however, patient also has glaucoma and has been advised by his ophthalmologist to avoid futures courses of steroids. He has had a hoarse, breathy voice for the past six months. Son in law reports significant weight loss over past few months. He is PEG-dependent and does not have any oral intake. Non-smoker, social alcohol use.      Subjective/ Interval:   : AFVSS on RA ON. Patient seen and evaluated this am. Pt with inspiratory stridor and increased secretions. Pt taken for a trach, DL with biopsy. Pt tolerated surgery well and was brought to SICU.  : AFVSS on TC. Patient seen and evaluated this am. Trach cuff deflated. Trach site dry. Mucous suctioned from trach.  : AFVSS on TC. Patient seen and evaluated this am doing well. Cuff remains deflated. Trach site dry. Some mucous suctioned from trach. Pt seen spitting up white-tinged mucous.  1/15: AFVSS on TC. Patient seen and evaluated this am doing well. Cuff remains deflated. Trach site dry. Some secretions suctioned from the trach site.  : patient seen this morning, trach sutured in place, continues to suction thick secretions, will start nebulized saline today   : Patient seen this morning, trach in place with thick secretions, continue IV abx,  continue nebulized saline meds, continue trach teaching with family   : Patient seen this morning,  trach in place.  continue teaching pending conversation with tumor board Monday, pain controlled, ambulating well   ; patient seen this morning, trach in place will remove sutures today, Dr. hudson ND Dr. Crisostomo will had goals of care discussion today with patient   ALLERGIES:  penicillin (Rash)      MEDICATIONS:  Antiinfectives:   cefepime   IVPB      cefepime   IVPB 2000 milliGRAM(s) IV Intermittent every 8 hours    IV fluids:  dextrose 5%. 1000 milliLiter(s) IV Continuous <Continuous>  dextrose 5%. 1000 milliLiter(s) IV Continuous <Continuous>    Hematologic/Anticoagulation:  enoxaparin Injectable 40 milliGRAM(s) SubCutaneous every 24 hours    Pain medications/Neuro:  acetaminophen   Oral Liquid .. 650 milliGRAM(s) Enteral Tube every 6 hours PRN  ondansetron Injectable 4 milliGRAM(s) IV Push every 6 hours PRN  oxyCODONE    IR 5 milliGRAM(s) Oral every 6 hours PRN  oxyCODONE    IR 2.5 milliGRAM(s) Oral every 6 hours PRN    Endocrine Medications:   dextrose 50% Injectable 25 Gram(s) IV Push once  dextrose 50% Injectable 12.5 Gram(s) IV Push once  dextrose 50% Injectable 25 Gram(s) IV Push once  dextrose Oral Gel 15 Gram(s) Oral once PRN  glucagon  Injectable 1 milliGRAM(s) IntraMuscular once  insulin lispro (ADMELOG) corrective regimen sliding scale   SubCutaneous every 6 hours    All other standing medications:   acetylcysteine 20%  Inhalation 4 milliLiter(s) Inhalation two times a day  chlorhexidine 4% Liquid 1 Application(s) Topical <User Schedule>  doxazosin 1 milliGRAM(s) Oral at bedtime  influenza  Vaccine (HIGH DOSE) 0.7 milliLiter(s) IntraMuscular once  polyethylene glycol 3350 17 Gram(s) Oral every 24 hours  sodium chloride 0.9% for Nebulization 3 milliLiter(s) Nebulizer every 4 hours    All other PRN medications:  albuterol    90 MICROgram(s) HFA Inhaler 2 Puff(s) Inhalation every 6 hours PRN    Vital Signs Last 24 Hrs  T(C): 36.7 (2024 05:00), Max: 36.9 (2024 13:00)  T(F): 98.1 (2024 05:00), Max: 98.5 (2024 21:50)  HR: 82 (2024 08:12) (78 - 92)  BP: 129/66 (2024 08:12) (119/63 - 144/73)  BP(mean): 89 (2024 08:12) (85 - 103)  RR: 18 (2024 08:12) (16 - 20)  SpO2: 99% (2024 08:12) (92% - 99%)    Parameters below as of 2024 08:12  Patient On (Oxygen Delivery Method): tracheostomy collar  O2 Flow (L/min): 10  O2 Concentration (%): 40       @ 07:01  -  - @ 07:00  --------------------------------------------------------  IN:    Enteral Tube Flush: 600 mL    Jevity 1.2: 1184 mL  Total IN: 1784 mL    OUT:    Oral Fluid: 0 mL    Voided (mL): 1300 mL  Total OUT: 1300 mL    Total NET: 484 mL              PHYSICAL EXAM:  GEN: appears stated age, NAD  NEURO: alert & oriented x 4/  HEENT: trach intact - sutures in place, mild yellow secretions noted from trach and suctioned  CVS: regular rate and rhythm  Pulm: normal respiratory excursions, not tachypneic, no labored breathing on trach collar   Abd: non-distended  Ext: moving all four extremities, no peripheral edema noted      LABS                       10.1   6.84  )-----------( 289      ( 2024 05:30 )             31.3        139  |  100  |  28<H>  ----------------------------<  153<H>  4.3   |  32<H>  |  0.77    Ca    9.2      2024 05:30  Phos  2.5       Mg     2.1                Coagulation Studies-     Urinalysis Basic - ( 2024 05:30 )    Color: x / Appearance: x / SG: x / pH: x  Gluc: 153 mg/dL / Ketone: x  / Bili: x / Urobili: x   Blood: x / Protein: x / Nitrite: x   Leuk Esterase: x / RBC: x / WBC x   Sq Epi: x / Non Sq Epi: x / Bacteria: x      Endocrine Panel-  Calcium: 9.2 mg/dL ( @ 05:30)                MICROBIOLOGY:  Culture - Sputum (collected 01-15-24 @ 15:29)  Source: .Sputum Trachial Aspirate  Gram Stain (01-15-24 @ 22:28):    Few epithelial cells    Moderate White blood cells    Few-moderate Gram positive cocci in pairs    Few-moderate Gram Negative Rods    Rare Gram Positive Rods  Final Report (24 @ 08:33):    Numerous Pseudomonas aeruginosa    Accompanied by normal respiratory jose manuel  Organism: Pseudomonas aeruginosa  Pseudomonas aeruginosa (24 @ 08:33)  Organism: Pseudomonas aeruginosa (24 @ 08:33)      -  Tobramycin: S      Method Type: KB  Organism: Pseudomonas aeruginosa (24 @ 08:33)      -  Levofloxacin: S <=0.5      -  Aztreonam: S <=4      -  Cefepime: S <=2      -  Piperacillin/Tazobactam: S <=8      -  Ciprofloxacin: S <=0.25      Method Type: LATOYA      -  Meropenem: S <=1      Culture Results:   Numerous Pseudomonas aeruginosa  Accompanied by normal respiratory jose manuel (01-15-24 @ 15:29)      Culture - Sputum (collected 01-15-24 @ 15:29)  Source: .Sputum Trachial Aspirate  Gram Stain (01-15-24 @ 22:28):    Few epithelial cells    Moderate White blood cells    Few-moderate Gram positive cocci in pairs    Few-moderate Gram Negative Rods    Rare Gram Positive Rods  Final Report (24 @ 08:33):    Numerous Pseudomonas aeruginosa    Accompanied by normal respiratory jose manuel  Organism: Pseudomonas aeruginosa  Pseudomonas aeruginosa (24 @ 08:33)  Organism: Pseudomonas aeruginosa (24 @ 08:33)      -  Tobramycin: S      Method Type: KB  Organism: Pseudomonas aeruginosa (24 @ 08:33)      -  Levofloxacin: S <=0.5      -  Aztreonam: S <=4      -  Cefepime: S <=2      -  Piperacillin/Tazobactam: S <=8      -  Ciprofloxacin: S <=0.25      Method Type: LATOYA      -  Meropenem: S <=1        RADIOLOGY & ADDITIONAL STUDIES:

## 2024-01-19 NOTE — PROCEDURE NOTE - ADDITIONAL PROCEDURE DETAILS
PROCEDURE NOTE:     Procedure: Tracheostomy exchange prior to maturation of tract (CPT 49407)      Pre-Procedure Diagnosis: Laryngeal cancer, tracheostomy dependence  Post-Procedure Diagnosis: Laryngeal cancer, tracheostomy dependence     Indications for Procedure:  is gisselle GROVE 88yMalewho presented with laryngeal cancer , status post tracheostomy on 1/12/23 See above full HPI for further details. On POD 7, a tracheostomy exchange was /required to ensure the stoma and tract were healing appropriately and to change patient to cuffless tracheostomy.      Description of Procedure: After obtaining verbal consent, the patient was positioned with shoulder roll supine. Flexible suctioning was performed to clear the secretions. The existing 7.5 portex cuffed  tracheostomy was checked and ready with obturator. I did remove the 4 silk sutures. Next, the velcroe tie was removed and the tracheostomy tube was removed. The stoma was well healed. The tract appeared well formed without granulation or collapse. I did clear the skin with saline, and place an Allevyn dressing inferior to the stoma. I did replace the new 7.5 portex cuffless  tracheostomy without difficulty and secured with velcroe trach tie. The patient tolerated the procedure well without complications. Confirmed placement with flexible scope haim visualized      Findings:  - Well formed stoma and tract  - Easy, uncomplicated placement of 7.5 portex cuffless

## 2024-01-19 NOTE — PROGRESS NOTE ADULT - ASSESSMENT
Assessment and Plan:    Assessment and Plan:  KUSUM GROVE is a 88M with history of known R sided laryngeal mass (known to Dr. 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 FOE: large ulcerative lesion R arytenoid/FVF obscuring view of R TVF but likely immobile, L nodular arytenoid mass, L TVF mobile. s/p tracheostomy.     Plan:  - Family and son trach teaching   - Tube feeds at goal  - Cuff deflated  - Continue TC  - Plan pending tumor board discussion Monday   - Pending discussion with Dr. hudson and Dr. Crisostomo later today       [facility] at [phone] with any questions/concerns.    Nica Salinas PA-C  01-19-24 @ 09:09

## 2024-01-20 LAB
A1C WITH ESTIMATED AVERAGE GLUCOSE RESULT: 6.4 % — HIGH (ref 4–5.6)
ANION GAP SERPL CALC-SCNC: 9 MMOL/L — SIGNIFICANT CHANGE UP (ref 5–17)
BUN SERPL-MCNC: 30 MG/DL — HIGH (ref 7–23)
CALCIUM SERPL-MCNC: 9.2 MG/DL — SIGNIFICANT CHANGE UP (ref 8.4–10.5)
CHLORIDE SERPL-SCNC: 100 MMOL/L — SIGNIFICANT CHANGE UP (ref 96–108)
CO2 SERPL-SCNC: 30 MMOL/L — SIGNIFICANT CHANGE UP (ref 22–31)
CREAT SERPL-MCNC: 0.73 MG/DL — SIGNIFICANT CHANGE UP (ref 0.5–1.3)
EGFR: 88 ML/MIN/1.73M2 — SIGNIFICANT CHANGE UP
ESTIMATED AVERAGE GLUCOSE: 137 MG/DL — HIGH (ref 68–114)
GLUCOSE BLDC GLUCOMTR-MCNC: 111 MG/DL — HIGH (ref 70–99)
GLUCOSE BLDC GLUCOMTR-MCNC: 140 MG/DL — HIGH (ref 70–99)
GLUCOSE BLDC GLUCOMTR-MCNC: 151 MG/DL — HIGH (ref 70–99)
GLUCOSE BLDC GLUCOMTR-MCNC: 154 MG/DL — HIGH (ref 70–99)
GLUCOSE BLDC GLUCOMTR-MCNC: 250 MG/DL — HIGH (ref 70–99)
GLUCOSE SERPL-MCNC: 149 MG/DL — HIGH (ref 70–99)
HCT VFR BLD CALC: 32.3 % — LOW (ref 39–50)
HGB BLD-MCNC: 10.4 G/DL — LOW (ref 13–17)
MAGNESIUM SERPL-MCNC: 2.2 MG/DL — SIGNIFICANT CHANGE UP (ref 1.6–2.6)
MCHC RBC-ENTMCNC: 31.7 PG — SIGNIFICANT CHANGE UP (ref 27–34)
MCHC RBC-ENTMCNC: 32.2 GM/DL — SIGNIFICANT CHANGE UP (ref 32–36)
MCV RBC AUTO: 98.5 FL — SIGNIFICANT CHANGE UP (ref 80–100)
NRBC # BLD: 0 /100 WBCS — SIGNIFICANT CHANGE UP (ref 0–0)
PHOSPHATE SERPL-MCNC: 2.7 MG/DL — SIGNIFICANT CHANGE UP (ref 2.5–4.5)
PLATELET # BLD AUTO: 293 K/UL — SIGNIFICANT CHANGE UP (ref 150–400)
POTASSIUM SERPL-MCNC: 4.2 MMOL/L — SIGNIFICANT CHANGE UP (ref 3.5–5.3)
POTASSIUM SERPL-SCNC: 4.2 MMOL/L — SIGNIFICANT CHANGE UP (ref 3.5–5.3)
RBC # BLD: 3.28 M/UL — LOW (ref 4.2–5.8)
RBC # FLD: 13.2 % — SIGNIFICANT CHANGE UP (ref 10.3–14.5)
SODIUM SERPL-SCNC: 139 MMOL/L — SIGNIFICANT CHANGE UP (ref 135–145)
WBC # BLD: 7.19 K/UL — SIGNIFICANT CHANGE UP (ref 3.8–10.5)
WBC # FLD AUTO: 7.19 K/UL — SIGNIFICANT CHANGE UP (ref 3.8–10.5)

## 2024-01-20 RX ORDER — CEFEPIME 1 G/1
2000 INJECTION, POWDER, FOR SOLUTION INTRAMUSCULAR; INTRAVENOUS EVERY 8 HOURS
Refills: 0 | Status: DISCONTINUED | OUTPATIENT
Start: 2024-01-20 | End: 2024-01-26

## 2024-01-20 RX ORDER — POLYETHYLENE GLYCOL 3350 17 G/17G
17 POWDER, FOR SOLUTION ORAL DAILY
Refills: 0 | Status: DISCONTINUED | OUTPATIENT
Start: 2024-01-20 | End: 2024-02-01

## 2024-01-20 RX ADMIN — SODIUM CHLORIDE 3 MILLILITER(S): 9 INJECTION INTRAMUSCULAR; INTRAVENOUS; SUBCUTANEOUS at 17:07

## 2024-01-20 RX ADMIN — SODIUM CHLORIDE 3 MILLILITER(S): 9 INJECTION INTRAMUSCULAR; INTRAVENOUS; SUBCUTANEOUS at 01:05

## 2024-01-20 RX ADMIN — SODIUM CHLORIDE 3 MILLILITER(S): 9 INJECTION INTRAMUSCULAR; INTRAVENOUS; SUBCUTANEOUS at 11:16

## 2024-01-20 RX ADMIN — SODIUM CHLORIDE 3 MILLILITER(S): 9 INJECTION INTRAMUSCULAR; INTRAVENOUS; SUBCUTANEOUS at 13:18

## 2024-01-20 RX ADMIN — Medication 1: at 06:12

## 2024-01-20 RX ADMIN — Medication 4 MILLILITER(S): at 05:06

## 2024-01-20 RX ADMIN — Medication 1: at 00:25

## 2024-01-20 RX ADMIN — CEFEPIME 100 MILLIGRAM(S): 1 INJECTION, POWDER, FOR SOLUTION INTRAMUSCULAR; INTRAVENOUS at 13:14

## 2024-01-20 RX ADMIN — SODIUM CHLORIDE 3 MILLILITER(S): 9 INJECTION INTRAMUSCULAR; INTRAVENOUS; SUBCUTANEOUS at 21:53

## 2024-01-20 RX ADMIN — CEFEPIME 100 MILLIGRAM(S): 1 INJECTION, POWDER, FOR SOLUTION INTRAMUSCULAR; INTRAVENOUS at 05:06

## 2024-01-20 RX ADMIN — SODIUM CHLORIDE 3 MILLILITER(S): 9 INJECTION INTRAMUSCULAR; INTRAVENOUS; SUBCUTANEOUS at 05:06

## 2024-01-20 RX ADMIN — ENOXAPARIN SODIUM 40 MILLIGRAM(S): 100 INJECTION SUBCUTANEOUS at 11:18

## 2024-01-20 RX ADMIN — CEFEPIME 100 MILLIGRAM(S): 1 INJECTION, POWDER, FOR SOLUTION INTRAMUSCULAR; INTRAVENOUS at 21:53

## 2024-01-20 RX ADMIN — Medication 2: at 18:09

## 2024-01-20 RX ADMIN — CHLORHEXIDINE GLUCONATE 1 APPLICATION(S): 213 SOLUTION TOPICAL at 06:09

## 2024-01-20 RX ADMIN — Medication 4 MILLILITER(S): at 17:07

## 2024-01-20 RX ADMIN — Medication 1 MILLIGRAM(S): at 21:54

## 2024-01-20 NOTE — PROGRESS NOTE ADULT - ASSESSMENT
OTOLARYNGOLOGY (ENT) PROGRESS NOTE    PATIENT: KUSUM GROVE     MRN: [id number]       : 35  ADMISSION:24  DATE OF SERVICE:  24 @ 09:33  			         ID: KUSUM GROVE is an 88M with history of known R sided laryngeal mass (known to Dr. Crisostomo, biopsy R piriform sinus 2023 pos. for SCC, now s/p RT, PEG-dependent) presenting with worsening shortness of breath for past month and new imaging findings on CT scan from one week ago in [facility] concerning for new laryngeal mass. Patient lives and receives care in [facility]. He reports increased cough and mucous production associated with intermittent episodes of worsening shortness of breath for the past month. This has prompted ED visits for evaluation and discharge with steroid courses, which temporarily improved his breathing difficulty, however, patient also has glaucoma and has been advised by his ophthalmologist to avoid futures courses of steroids. He has had a hoarse, breathy voice for the past six months. Son in law reports significant weight loss over past few months. He is PEG-dependent and does not have any oral intake. Non-smoker, social alcohol use.      Subjective/ Interval:   : AFVSS on RA ON. Patient seen and evaluated this am. Pt with inspiratory stridor and increased secretions. Pt taken for a trach, DL with biopsy. Pt tolerated surgery well and was brought to SICU.  : AFVSS on TC. Patient seen and evaluated this am. Trach cuff deflated. Trach site dry. Mucous suctioned from trach.  : AFVSS on TC. Patient seen and evaluated this am doing well. Cuff remains deflated. Trach site dry. Some mucous suctioned from trach. Pt seen spitting up white-tinged mucous.  1/15: AFVSS on TC. Patient seen and evaluated this am doing well. Cuff remains deflated. Trach site dry. Some secretions suctioned from the trach site.  : patient seen this morning, trach sutured in place, continues to suction thick secretions, will start nebulized saline today   : Patient seen this morning, trach in place with thick secretions, continue IV abx,  continue nebulized saline meds, continue trach teaching with family   : Patient seen this morning,  trach in place.  continue teaching pending conversation with tumor board Monday, pain controlled, ambulating well   ; patient seen this morning, trach in place will remove sutures today, Dr. hudson ND Dr. Crisostomo will had goals of care discussion today with patient   : patient seen on morning rounds. Cuffless trach in place, secured by trach collar. Still with thick secretions requiring frequent suctioning. reports able to ambulate yesterday. Had GOC discussion and will not pursue surgical management.     Subjective:  No acute events. Still with thick secretions. Tolerating humidified air via trach collar. Receiving trach care and frequent suctioning. On antibiotics.       Objective:    Vital Signs:  T(C): 36.5 (24 @ 08:51), Max: 36.9 (24 @ 14:00)  HR: 88 (24 @ 08:06) (76 - 88)  BP: 123/61 (24 @ 08:06) (123/61 - 136/69)  RR: 17 (24 @ 08:06) (17 - 18)  SpO2: 98% (24 @ 08:06) (95% - 99%)    PHYSICAL EXAM:  GEN: appears stated age, NAD  NEURO: alert & oriented x /  HEENT: 7.5 Portex uncuffed tracheostomy secured to stoma via soft velcro tie; thickened tan secretions noted from trach and suctioned. Padding in place deep to flange; no skin breakdown noted.   CVS: regular rate and rhythm  Pulm: normal respiratory excursions, not tachypneic, no labored breathing on trach collar   Abd: non-distended  Ext: moving all four extremities, no peripheral edema noted    LINES/DRAINS/AIRWAY:  ***    LABORATORY:                        10.4   7.19  )-----------( 293      ( 2024 05:30 )             32.3        139  |  100  |  30<H>  ----------------------------<  149<H>  4.2   |  30  |  0.73    Ca    9.2      2024 05:30  Phos  2.7       Mg     2.2          [id number]    MICROBIOLOGY:      I&O:    24 @ 07:24 @ 07:00  --------------------------------------------------------  IN: 1448 mL / OUT: 1000 mL / NET: 448 mL    24 @ 07:01  24 @ 09:33  --------------------------------------------------------  IN: 474 mL / OUT: 0 mL / NET: 474 mL         IMAGING:     MEDICATIONS:  acetaminophen   Oral Liquid .. 650 milliGRAM(s) Enteral Tube every 6 hours PRN  acetylcysteine 20%  Inhalation 4 milliLiter(s) Inhalation two times a day  albuterol    90 MICROgram(s) HFA Inhaler 2 Puff(s) Inhalation every 6 hours PRN  cefepime   IVPB      cefepime   IVPB 2000 milliGRAM(s) IV Intermittent every 8 hours  chlorhexidine 4% Liquid 1 Application(s) Topical <User Schedule>  dextrose 5%. 1000 milliLiter(s) IV Continuous <Continuous>  dextrose 5%. 1000 milliLiter(s) IV Continuous <Continuous>  dextrose 50% Injectable 25 Gram(s) IV Push once  dextrose 50% Injectable 25 Gram(s) IV Push once  dextrose 50% Injectable 12.5 Gram(s) IV Push once  dextrose Oral Gel 15 Gram(s) Oral once PRN  doxazosin 1 milliGRAM(s) Oral at bedtime  enoxaparin Injectable 40 milliGRAM(s) SubCutaneous every 24 hours  glucagon  Injectable 1 milliGRAM(s) IntraMuscular once  influenza  Vaccine (HIGH DOSE) 0.7 milliLiter(s) IntraMuscular once  insulin lispro (ADMELOG) corrective regimen sliding scale   SubCutaneous every 6 hours  ondansetron Injectable 4 milliGRAM(s) IV Push every 6 hours PRN  oxyCODONE    IR 5 milliGRAM(s) Oral every 6 hours PRN  oxyCODONE    IR 2.5 milliGRAM(s) Oral every 6 hours PRN  polyethylene glycol 3350 17 Gram(s) Oral every 24 hours  sodium chloride 0.9% for Nebulization 3 milliLiter(s) Nebulizer every 4 hours      Assessment and Plan:  KUSUM GROVE is a 88M with history of known R sided laryngeal mass (known to Dr. Crisostomo, biopsy R piriform sinus 2023 pos. for SCC, now s/p RT, PEG-dependent) presenting with worsening shortness of breath for past month and new imaging findings on CT scan from one week ago in [facility] concerning for new laryngeal mass. FOE: large ulcerative lesion R arytenoid/FVF obscuring view of R TVF but likely immobile, L nodular arytenoid mass, L TVF mobile. s/p tracheostomy. After GOC discussion  patient and family will not pursue any surgical treatment at this time. Plan to return home with trach supplies once delivered.     Plan:  - Family and son trach teaching ongoing  - Tube feeds at goal  - Has 7.5 Portex uncuffed tracheostomy tube in place -sutures removed  - Continue TC  - Will discuss at tumor board  but family has indicated they wish to not pursue surgery  - Coordinate with social work for delivery of trach supplies to [facility] - will need procurement of suction canister here in  prior to leaving  - ID recs appreciated; total 10 day duration of abx -, on Cefepime inpatient; transition to Lefofloxacin 75mg liquid PEG q8h upon discharge         Disposition:   - Home with trach supplies    [facility] at [phone] with any questions/concerns.    Bebo Coombs MD PGY2  24 @ 09:33

## 2024-01-21 LAB
ANION GAP SERPL CALC-SCNC: 8 MMOL/L — SIGNIFICANT CHANGE UP (ref 5–17)
BUN SERPL-MCNC: 28 MG/DL — HIGH (ref 7–23)
CALCIUM SERPL-MCNC: 9.3 MG/DL — SIGNIFICANT CHANGE UP (ref 8.4–10.5)
CHLORIDE SERPL-SCNC: 100 MMOL/L — SIGNIFICANT CHANGE UP (ref 96–108)
CO2 SERPL-SCNC: 31 MMOL/L — SIGNIFICANT CHANGE UP (ref 22–31)
CREAT SERPL-MCNC: 0.69 MG/DL — SIGNIFICANT CHANGE UP (ref 0.5–1.3)
EGFR: 89 ML/MIN/1.73M2 — SIGNIFICANT CHANGE UP
GLUCOSE BLDC GLUCOMTR-MCNC: 128 MG/DL — HIGH (ref 70–99)
GLUCOSE BLDC GLUCOMTR-MCNC: 131 MG/DL — HIGH (ref 70–99)
GLUCOSE BLDC GLUCOMTR-MCNC: 135 MG/DL — HIGH (ref 70–99)
GLUCOSE SERPL-MCNC: 144 MG/DL — HIGH (ref 70–99)
HCT VFR BLD CALC: 32.9 % — LOW (ref 39–50)
HGB BLD-MCNC: 10.6 G/DL — LOW (ref 13–17)
MAGNESIUM SERPL-MCNC: 2.2 MG/DL — SIGNIFICANT CHANGE UP (ref 1.6–2.6)
MCHC RBC-ENTMCNC: 31.7 PG — SIGNIFICANT CHANGE UP (ref 27–34)
MCHC RBC-ENTMCNC: 32.2 GM/DL — SIGNIFICANT CHANGE UP (ref 32–36)
MCV RBC AUTO: 98.5 FL — SIGNIFICANT CHANGE UP (ref 80–100)
NRBC # BLD: 0 /100 WBCS — SIGNIFICANT CHANGE UP (ref 0–0)
PHOSPHATE SERPL-MCNC: 2.7 MG/DL — SIGNIFICANT CHANGE UP (ref 2.5–4.5)
PLATELET # BLD AUTO: 285 K/UL — SIGNIFICANT CHANGE UP (ref 150–400)
POTASSIUM SERPL-MCNC: 4.2 MMOL/L — SIGNIFICANT CHANGE UP (ref 3.5–5.3)
POTASSIUM SERPL-SCNC: 4.2 MMOL/L — SIGNIFICANT CHANGE UP (ref 3.5–5.3)
RBC # BLD: 3.34 M/UL — LOW (ref 4.2–5.8)
RBC # FLD: 13.1 % — SIGNIFICANT CHANGE UP (ref 10.3–14.5)
SODIUM SERPL-SCNC: 139 MMOL/L — SIGNIFICANT CHANGE UP (ref 135–145)
WBC # BLD: 7.4 K/UL — SIGNIFICANT CHANGE UP (ref 3.8–10.5)
WBC # FLD AUTO: 7.4 K/UL — SIGNIFICANT CHANGE UP (ref 3.8–10.5)

## 2024-01-21 RX ADMIN — SODIUM CHLORIDE 3 MILLILITER(S): 9 INJECTION INTRAMUSCULAR; INTRAVENOUS; SUBCUTANEOUS at 11:09

## 2024-01-21 RX ADMIN — SODIUM CHLORIDE 3 MILLILITER(S): 9 INJECTION INTRAMUSCULAR; INTRAVENOUS; SUBCUTANEOUS at 13:01

## 2024-01-21 RX ADMIN — CEFEPIME 100 MILLIGRAM(S): 1 INJECTION, POWDER, FOR SOLUTION INTRAMUSCULAR; INTRAVENOUS at 05:27

## 2024-01-21 RX ADMIN — Medication 4 MILLILITER(S): at 05:27

## 2024-01-21 RX ADMIN — SODIUM CHLORIDE 3 MILLILITER(S): 9 INJECTION INTRAMUSCULAR; INTRAVENOUS; SUBCUTANEOUS at 21:48

## 2024-01-21 RX ADMIN — SODIUM CHLORIDE 3 MILLILITER(S): 9 INJECTION INTRAMUSCULAR; INTRAVENOUS; SUBCUTANEOUS at 02:17

## 2024-01-21 RX ADMIN — CEFEPIME 100 MILLIGRAM(S): 1 INJECTION, POWDER, FOR SOLUTION INTRAMUSCULAR; INTRAVENOUS at 13:01

## 2024-01-21 RX ADMIN — ENOXAPARIN SODIUM 40 MILLIGRAM(S): 100 INJECTION SUBCUTANEOUS at 11:09

## 2024-01-21 RX ADMIN — SODIUM CHLORIDE 3 MILLILITER(S): 9 INJECTION INTRAMUSCULAR; INTRAVENOUS; SUBCUTANEOUS at 17:10

## 2024-01-21 RX ADMIN — SODIUM CHLORIDE 3 MILLILITER(S): 9 INJECTION INTRAMUSCULAR; INTRAVENOUS; SUBCUTANEOUS at 05:27

## 2024-01-21 RX ADMIN — Medication 1 MILLIGRAM(S): at 21:47

## 2024-01-21 RX ADMIN — CHLORHEXIDINE GLUCONATE 1 APPLICATION(S): 213 SOLUTION TOPICAL at 06:22

## 2024-01-21 RX ADMIN — CEFEPIME 100 MILLIGRAM(S): 1 INJECTION, POWDER, FOR SOLUTION INTRAMUSCULAR; INTRAVENOUS at 21:47

## 2024-01-21 RX ADMIN — Medication 4 MILLILITER(S): at 17:09

## 2024-01-21 NOTE — PROGRESS NOTE ADULT - ASSESSMENT
OTOLARYNGOLOGY (ENT) PROGRESS NOTE    PATIENT: KUSUM GROVE     MRN: [id number]       : 35  ADMISSION:24  DATE OF SERVICE:  24 @ 08:24  			         ID: KUSUM GROVE is an 88M with history of known R sided laryngeal mass (known to Dr. Crisostomo, biopsy R piriform sinus 2023 pos. for SCC, now s/p RT, PEG-dependent) presenting with worsening shortness of breath for past month and new imaging findings on CT scan from one week ago in [facility] concerning for new laryngeal mass. Patient lives and receives care in [facility]. He reports increased cough and mucous production associated with intermittent episodes of worsening shortness of breath for the past month. This has prompted ED visits for evaluation and discharge with steroid courses, which temporarily improved his breathing difficulty, however, patient also has glaucoma and has been advised by his ophthalmologist to avoid futures courses of steroids. He has had a hoarse, breathy voice for the past six months. Son in law reports significant weight loss over past few months. He is PEG-dependent and does not have any oral intake. Non-smoker, social alcohol use.      Subjective/ Interval:   : AFVSS on RA ON. Patient seen and evaluated this am. Pt with inspiratory stridor and increased secretions. Pt taken for a trach, DL with biopsy. Pt tolerated surgery well and was brought to SICU.  : AFVSS on TC. Patient seen and evaluated this am. Trach cuff deflated. Trach site dry. Mucous suctioned from trach.  : AFVSS on TC. Patient seen and evaluated this am doing well. Cuff remains deflated. Trach site dry. Some mucous suctioned from trach. Pt seen spitting up white-tinged mucous.  1/15: AFVSS on TC. Patient seen and evaluated this am doing well. Cuff remains deflated. Trach site dry. Some secretions suctioned from the trach site.  : patient seen this morning, trach sutured in place, continues to suction thick secretions, will start nebulized saline today   : Patient seen this morning, trach in place with thick secretions, continue IV abx,  continue nebulized saline meds, continue trach teaching with family   : Patient seen this morning,  trach in place.  continue teaching pending conversation with tumor board Monday, pain controlled, ambulating well   ; patient seen this morning, trach in place will remove sutures today, Dr. hudson ND Dr. Crisostomo will had goals of care discussion today with patient   : patient seen on morning rounds. Cuffless trach in place, secured by trach collar. Still with thick secretions requiring frequent suctioning. reports able to ambulate yesterday. Had GOC discussion and will not pursue surgical management.   : patient seen on morning rounds. Cuffless trach in place, secured by trach collar. Secretions noted with small streaks of blood overnight. Ambulating, moving bowels, voiding freely. No new issues.                  Objective:    Vital Signs:  T(C): 36.4 (24 @ 04:48), Max: 37.4 (24 @ 14:00)  HR: 78 (24 @ 08:06) (64 - 80)  BP: 129/65 (24 @ 08:06) (118/65 - 144/80)  RR: 17 (24 @ 08:06) (16 - 18)  SpO2: 97% (24 @ 08:06) (95% - 100%)    PHYSICAL EXAM:  GEN: appears stated age, NAD  NEURO: alert & oriented x 4/  HEENT: 7.5 Portex uncuffed tracheostomy secured to stoma via soft velcro tie; thickened tan secretions with thin streaks of blood noted from trach and suctioned. Padding in place deep to flange; no skin breakdown noted.   CVS: regular rate and rhythm  Pulm: normal respiratory excursions, not tachypneic, no labored breathing on trach collar   Abd: non-distended  Ext: moving all four extremities, no peripheral edema noted     LINES/DRAINS/AIRWAY:  7.5 uncuffed Portex trach    LABORATORY:                        10.6   7.40  )-----------( 285      ( 2024 05:30 )             32.9        139  |  100  |  28<H>  ----------------------------<  144<H>  4.2   |  31  |  0.69    Ca    9.3      2024 05:30  Phos  2.7       Mg     2.2         I&O:    24 @ 07:24 @ 07:00  --------------------------------------------------------  IN: 2048 mL / OUT: 650 mL / NET: 1398 mL    24 @ 07:24 @ 08:24  --------------------------------------------------------  IN: 674 mL / OUT: 0 mL / NET: 674 mL         IMAGING:     MEDICATIONS:  acetaminophen   Oral Liquid .. 650 milliGRAM(s) Enteral Tube every 6 hours PRN  acetylcysteine 20%  Inhalation 4 milliLiter(s) Inhalation two times a day  albuterol    90 MICROgram(s) HFA Inhaler 2 Puff(s) Inhalation every 6 hours PRN  cefepime   IVPB 2000 milliGRAM(s) IV Intermittent every 8 hours  chlorhexidine 4% Liquid 1 Application(s) Topical <User Schedule>  dextrose 5%. 1000 milliLiter(s) IV Continuous <Continuous>  dextrose 5%. 1000 milliLiter(s) IV Continuous <Continuous>  dextrose 50% Injectable 12.5 Gram(s) IV Push once  dextrose 50% Injectable 25 Gram(s) IV Push once  dextrose 50% Injectable 25 Gram(s) IV Push once  dextrose Oral Gel 15 Gram(s) Oral once PRN  doxazosin 1 milliGRAM(s) Oral at bedtime  enoxaparin Injectable 40 milliGRAM(s) SubCutaneous every 24 hours  glucagon  Injectable 1 milliGRAM(s) IntraMuscular once  influenza  Vaccine (HIGH DOSE) 0.7 milliLiter(s) IntraMuscular once  insulin lispro (ADMELOG) corrective regimen sliding scale   SubCutaneous every 6 hours  ondansetron Injectable 4 milliGRAM(s) IV Push every 6 hours PRN  polyethylene glycol 3350 17 Gram(s) Oral daily  sodium chloride 0.9% for Nebulization 3 milliLiter(s) Nebulizer every 4 hours      Assessment and Plan:  KUSUM GROVE is a 88M with history of known R sided laryngeal mass (known to Dr. Crisostomo, biopsy R piriform sinus 2023 pos. for SCC, now s/p RT, PEG-dependent) presenting with worsening shortness of breath for past month and new imaging findings on CT scan from one week ago in [facility] concerning for new laryngeal mass. FOE: large ulcerative lesion R arytenoid/FVF obscuring view of R TVF but likely immobile, L nodular arytenoid mass, L TVF mobile. s/p tracheostomy. After GOC discussion  patient and family will not pursue any surgical treatment at this time. Plan to return home with trach supplies once delivered.     Plan:  - Family and son trach teaching ongoing  - Tube feeds at goal  - Has 7.5 Portex uncuffed tracheostomy tube in place -sutures removed  - Use soft suction/red rubber catheter for suctioning to avoid suction trauama  - Continue TC  - Will discuss at tumor board  but family has indicated they wish to not pursue surgery  - Coordinate with social work for delivery of trach supplies to [facility] - will need procurement of suction canister here in  prior to leaving - per  can be ordered and delivered on day of discharge  - ID recs appreciated; total 10 day duration of abx -, on Cefepime inpatient; transition to Lefofloxacin 75mg liquid PEG q8h upon discharge     Disposition:   - Home with trach supplies    [facility] at [phone] with any questions/concerns.    Bebo Coombs MD PGY2  24 @ 08:24

## 2024-01-21 NOTE — PROVIDER CONTACT NOTE (OTHER) - ASSESSMENT
the secretions coming from the trach are blood tinged, the pt also has very thick secretions throughout night, suctions frequently, pt also tries to cough up secretions.

## 2024-01-21 NOTE — PROVIDER CONTACT NOTE (OTHER) - BACKGROUND
88M with history of HTN, COPD, glaucoma, hard of hearing, osteoarthritis, known R sided laryngeal mass

## 2024-01-22 LAB
ANION GAP SERPL CALC-SCNC: 7 MMOL/L — SIGNIFICANT CHANGE UP (ref 5–17)
BUN SERPL-MCNC: 28 MG/DL — HIGH (ref 7–23)
CALCIUM SERPL-MCNC: 9.1 MG/DL — SIGNIFICANT CHANGE UP (ref 8.4–10.5)
CHLORIDE SERPL-SCNC: 101 MMOL/L — SIGNIFICANT CHANGE UP (ref 96–108)
CO2 SERPL-SCNC: 33 MMOL/L — HIGH (ref 22–31)
CREAT SERPL-MCNC: 0.74 MG/DL — SIGNIFICANT CHANGE UP (ref 0.5–1.3)
EGFR: 87 ML/MIN/1.73M2 — SIGNIFICANT CHANGE UP
GLUCOSE BLDC GLUCOMTR-MCNC: 112 MG/DL — HIGH (ref 70–99)
GLUCOSE BLDC GLUCOMTR-MCNC: 114 MG/DL — HIGH (ref 70–99)
GLUCOSE BLDC GLUCOMTR-MCNC: 128 MG/DL — HIGH (ref 70–99)
GLUCOSE BLDC GLUCOMTR-MCNC: 138 MG/DL — HIGH (ref 70–99)
GLUCOSE BLDC GLUCOMTR-MCNC: 196 MG/DL — HIGH (ref 70–99)
GLUCOSE SERPL-MCNC: 145 MG/DL — HIGH (ref 70–99)
HCT VFR BLD CALC: 32.1 % — LOW (ref 39–50)
HGB BLD-MCNC: 10.2 G/DL — LOW (ref 13–17)
MAGNESIUM SERPL-MCNC: 2 MG/DL — SIGNIFICANT CHANGE UP (ref 1.6–2.6)
MCHC RBC-ENTMCNC: 31.3 PG — SIGNIFICANT CHANGE UP (ref 27–34)
MCHC RBC-ENTMCNC: 31.8 GM/DL — LOW (ref 32–36)
MCV RBC AUTO: 98.5 FL — SIGNIFICANT CHANGE UP (ref 80–100)
NRBC # BLD: 0 /100 WBCS — SIGNIFICANT CHANGE UP (ref 0–0)
PHOSPHATE SERPL-MCNC: 2.8 MG/DL — SIGNIFICANT CHANGE UP (ref 2.5–4.5)
PLATELET # BLD AUTO: 295 K/UL — SIGNIFICANT CHANGE UP (ref 150–400)
POTASSIUM SERPL-MCNC: 4.5 MMOL/L — SIGNIFICANT CHANGE UP (ref 3.5–5.3)
POTASSIUM SERPL-SCNC: 4.5 MMOL/L — SIGNIFICANT CHANGE UP (ref 3.5–5.3)
RBC # BLD: 3.26 M/UL — LOW (ref 4.2–5.8)
RBC # FLD: 13.2 % — SIGNIFICANT CHANGE UP (ref 10.3–14.5)
SODIUM SERPL-SCNC: 141 MMOL/L — SIGNIFICANT CHANGE UP (ref 135–145)
WBC # BLD: 8.43 K/UL — SIGNIFICANT CHANGE UP (ref 3.8–10.5)
WBC # FLD AUTO: 8.43 K/UL — SIGNIFICANT CHANGE UP (ref 3.8–10.5)

## 2024-01-22 PROCEDURE — 99233 SBSQ HOSP IP/OBS HIGH 50: CPT

## 2024-01-22 RX ADMIN — Medication 1 MILLIGRAM(S): at 22:20

## 2024-01-22 RX ADMIN — ENOXAPARIN SODIUM 40 MILLIGRAM(S): 100 INJECTION SUBCUTANEOUS at 09:27

## 2024-01-22 RX ADMIN — Medication 4 MILLILITER(S): at 17:05

## 2024-01-22 RX ADMIN — SODIUM CHLORIDE 3 MILLILITER(S): 9 INJECTION INTRAMUSCULAR; INTRAVENOUS; SUBCUTANEOUS at 22:57

## 2024-01-22 RX ADMIN — Medication 4 MILLILITER(S): at 05:19

## 2024-01-22 RX ADMIN — SODIUM CHLORIDE 3 MILLILITER(S): 9 INJECTION INTRAMUSCULAR; INTRAVENOUS; SUBCUTANEOUS at 02:14

## 2024-01-22 RX ADMIN — CEFEPIME 100 MILLIGRAM(S): 1 INJECTION, POWDER, FOR SOLUTION INTRAMUSCULAR; INTRAVENOUS at 05:19

## 2024-01-22 RX ADMIN — SODIUM CHLORIDE 3 MILLILITER(S): 9 INJECTION INTRAMUSCULAR; INTRAVENOUS; SUBCUTANEOUS at 05:20

## 2024-01-22 RX ADMIN — SODIUM CHLORIDE 3 MILLILITER(S): 9 INJECTION INTRAMUSCULAR; INTRAVENOUS; SUBCUTANEOUS at 17:05

## 2024-01-22 RX ADMIN — CEFEPIME 100 MILLIGRAM(S): 1 INJECTION, POWDER, FOR SOLUTION INTRAMUSCULAR; INTRAVENOUS at 14:38

## 2024-01-22 RX ADMIN — POLYETHYLENE GLYCOL 3350 17 GRAM(S): 17 POWDER, FOR SOLUTION ORAL at 11:08

## 2024-01-22 RX ADMIN — SODIUM CHLORIDE 3 MILLILITER(S): 9 INJECTION INTRAMUSCULAR; INTRAVENOUS; SUBCUTANEOUS at 14:38

## 2024-01-22 RX ADMIN — SODIUM CHLORIDE 3 MILLILITER(S): 9 INJECTION INTRAMUSCULAR; INTRAVENOUS; SUBCUTANEOUS at 09:27

## 2024-01-22 RX ADMIN — Medication 1: at 17:10

## 2024-01-22 RX ADMIN — CEFEPIME 100 MILLIGRAM(S): 1 INJECTION, POWDER, FOR SOLUTION INTRAMUSCULAR; INTRAVENOUS at 22:20

## 2024-01-22 NOTE — PROGRESS NOTE ADULT - ASSESSMENT
Assessment and Plan:  KUSUM GROVE is a 88M with history of known R sided laryngeal mass (known to Dr. 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 FOE: large ulcerative lesion R arytenoid/FVF obscuring view of R TVF but likely immobile, L nodular arytenoid mass, L TVF mobile. s/p tracheostomy. After GOC discussion 1/19 patient and family will not pursue any surgical treatment at this time. Plan to return home with trach supplies once delivered.     Plan:  - Family and son trach teaching ongoing  - Tube feeds at goal  - Has 7.5 Portex uncuffed tracheostomy tube in place -sutures removed  - Use soft suction/red rubber catheter for suctioning to avoid suction trauama  - Continue TC  - Will discuss at tumor board monday 1/23 but family has indicated they wish to not pursue surgery  - Coordinate with social work for delivery of trach supplies to [facility] - will need procurement of suction canister here in  prior to leaving - per  can be ordered and delivered on day of discharge  - ID recs appreciated; total 10 day duration of abx 1/16-1/25, on Cefepime inpatient; transition to Lefofloxacin 75mg liquid PEG q8h upon discharge     Disposition:   - Home with trach supplies

## 2024-01-22 NOTE — PROGRESS NOTE ADULT - SUBJECTIVE AND OBJECTIVE BOX
OTOLARYNGOLOGY (ENT) PROGRESS NOTE    PATIENT: KUSUM GROVE  MRN: [id number]  : 35  [id number]  DATE OF SERVICE:  24  			         ID:KUSUM GROVE is an 88M with history of known R sided laryngeal mass (known to Dr. 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 Patient lives and receives care in [facility]. He reports increased cough and mucous production associated with intermittent episodes of worsening shortness of breath for the past month. This has prompted ED visits for evaluation and discharge with steroid courses, which temporarily improved his breathing difficulty, however, patient also has glaucoma and has been advised by his ophthalmologist to avoid futures courses of steroids. He has had a hoarse, breathy voice for the past six months. Son in law reports significant weight loss over past few months. He is PEG-dependent and does not have any oral intake. Non-smoker, social alcohol use.      Subjective/ Interval:   : AFVSS on RA ON. Patient seen and evaluated this am. Pt with inspiratory stridor and increased secretions. Pt taken for a trach, DL with biopsy. Pt tolerated surgery well and was brought to SICU.  : AFVSS on TC. Patient seen and evaluated this am. Trach cuff deflated. Trach site dry. Mucous suctioned from trach.  : AFVSS on TC. Patient seen and evaluated this am doing well. Cuff remains deflated. Trach site dry. Some mucous suctioned from trach. Pt seen spitting up white-tinged mucous.  1/15: AFVSS on TC. Patient seen and evaluated this am doing well. Cuff remains deflated. Trach site dry. Some secretions suctioned from the trach site.  : patient seen this morning, trach sutured in place, continues to suction thick secretions, will start nebulized saline today   : Patient seen this morning, trach in place with thick secretions, continue IV abx,  continue nebulized saline meds, continue trach teaching with family   : Patient seen this morning,  trach in place.  continue teaching pending conversation with tumor board Monday, pain controlled, ambulating well   ; patient seen this morning, trach in place will remove sutures today, Dr. hudson ND Dr. Crisostomo will had goals of care discussion today with patient   : patient seen on morning rounds. Cuffless trach in place, secured by trach collar. Still with thick secretions requiring frequent suctioning. reports able to ambulate yesterday. Had GOC discussion and will not pursue surgical management.   : patient seen on morning rounds. Cuffless trach in place, secured by trach collar. Secretions noted with small streaks of blood overnight. Ambulating, moving bowels, voiding freely. No new issues.   : AFVSS. Patient seen and evaluated on am rounds. Cuffless trach in place and secure. Secretions noted, pt suctioned. Pt ambulating.    ALLERGIES:  penicillin (Rash)      MEDICATIONS:  Antiinfectives:   cefepime   IVPB 2000 milliGRAM(s) IV Intermittent every 8 hours    IV fluids:  dextrose 5%. 1000 milliLiter(s) IV Continuous <Continuous>  dextrose 5%. 1000 milliLiter(s) IV Continuous <Continuous>    Hematologic/Anticoagulation:  enoxaparin Injectable 40 milliGRAM(s) SubCutaneous every 24 hours    Pain medications/Neuro:  acetaminophen   Oral Liquid .. 650 milliGRAM(s) Enteral Tube every 6 hours PRN  ondansetron Injectable 4 milliGRAM(s) IV Push every 6 hours PRN    Endocrine Medications:   dextrose 50% Injectable 25 Gram(s) IV Push once  dextrose 50% Injectable 12.5 Gram(s) IV Push once  dextrose 50% Injectable 25 Gram(s) IV Push once  dextrose Oral Gel 15 Gram(s) Oral once PRN  glucagon  Injectable 1 milliGRAM(s) IntraMuscular once  insulin lispro (ADMELOG) corrective regimen sliding scale   SubCutaneous every 6 hours    All other standing medications:   acetylcysteine 20%  Inhalation 4 milliLiter(s) Inhalation two times a day  chlorhexidine 4% Liquid 1 Application(s) Topical <User Schedule>  doxazosin 1 milliGRAM(s) Oral at bedtime  influenza  Vaccine (HIGH DOSE) 0.7 milliLiter(s) IntraMuscular once  polyethylene glycol 3350 17 Gram(s) Oral daily  sodium chloride 0.9% for Nebulization 3 milliLiter(s) Nebulizer every 4 hours    All other PRN medications:  albuterol    90 MICROgram(s) HFA Inhaler 2 Puff(s) Inhalation every 6 hours PRN    Vital Signs Last 24 Hrs  T(C): 36.4 (2024 05:00), Max: 37.1 (2024 21:55)  T(F): 97.5 (2024 05:00), Max: 98.7 (2024 21:55)  HR: 74 (2024 03:55) (70 - 80)  BP: 145/74 (2024 03:55) (116/60 - 145/74)  BP(mean): 103 (2024 03:55) (80 - 103)  RR: 17 (2024 03:55) (17 - 19)  SpO2: 98% (2024 03:55) (96% - 98%)    Parameters below as of 2024 03:55  Patient On (Oxygen Delivery Method): tracheostomy collar  O2 Flow (L/min): 10  O2 Concentration (%): 35      01-20 @ 07:01  -  -21 @ 07:00  --------------------------------------------------------  IN:    Enteral Tube Flush: 1100 mL    Jevity 1.2: 948 mL  Total IN: 2048 mL    OUT:    Voided (mL): 650 mL  Total OUT: 650 mL    Total NET: 1398 mL       @ 07:01  -  -22 @ 06:38  --------------------------------------------------------  IN:    Enteral Tube Flush: 1600 mL    Jevity 1.2: 1422 mL  Total IN: 3022 mL    OUT:    Voided (mL): 525 mL  Total OUT: 525 mL    Total NET: 2497 mL      PHYSICAL EXAM:  GEN: appears stated age, NAD  NEURO: alert & oriented x 4/4  HEENT: face symmetric, cuffless trach in place with trach ties, secretions suctioned  CVS: regular rate and rhythm  Pulm: normal respiratory excursions, not tachypneic, no labored breathing  Abd: non-distended  Ext: moving all four extremities, no peripheral edema noted       LABS                       10.2   8.43  )-----------( 295      ( 2024 05:30 )             32.1        139  |  100  |  28<H>  ----------------------------<  144<H>  4.2   |  31  |  0.69    Ca    9.3      2024 05:30  Phos  2.7       Mg     2.2                Coagulation Studies-     Urinalysis Basic - ( 2024 05:30 )    Color: x / Appearance: x / SG: x / pH: x  Gluc: 144 mg/dL / Ketone: x  / Bili: x / Urobili: x   Blood: x / Protein: x / Nitrite: x   Leuk Esterase: x / RBC: x / WBC x   Sq Epi: x / Non Sq Epi: x / Bacteria: x      Endocrine Panel-                MICROBIOLOGY:  Culture - Sputum (collected 01-15-24 @ 15:29)  Source: .Sputum Trachial Aspirate  Gram Stain (01-15-24 @ 22:28):    Few epithelial cells    Moderate White blood cells    Few-moderate Gram positive cocci in pairs    Few-moderate Gram Negative Rods    Rare Gram Positive Rods  Final Report (24 @ 08:33):    Numerous Pseudomonas aeruginosa    Accompanied by normal respiratory jose manuel  Organism: Pseudomonas aeruginosa  Pseudomonas aeruginosa (24 @ 08:33)  Organism: Pseudomonas aeruginosa (24 @ 08:33)      Method Type: KB      -  Tobramycin: S  Organism: Pseudomonas aeruginosa (24 @ 08:33)      Method Type: LATOYA      -  Aztreonam: S <=4      -  Cefepime: S <=2      -  Ciprofloxacin: S <=0.25      -  Levofloxacin: S <=0.5      -  Piperacillin/Tazobactam: S <=8      -  Meropenem: S <=1      Culture Results:   Numerous Pseudomonas aeruginosa  Accompanied by normal respiratory jose manuel (01-15-24 @ 15:29)      Culture - Sputum (collected 01-15-24 @ 15:29)  Source: .Sputum Trachial Aspirate  Gram Stain (01-15-24 @ 22:28):    Few epithelial cells    Moderate White blood cells    Few-moderate Gram positive cocci in pairs    Few-moderate Gram Negative Rods    Rare Gram Positive Rods  Final Report (24 @ 08:33):    Numerous Pseudomonas aeruginosa    Accompanied by normal respiratory jose manuel  Organism: Pseudomonas aeruginosa  Pseudomonas aeruginosa (24 @ 08:33)  Organism: Pseudomonas aeruginosa (24 @ 08:33)      Method Type: KB      -  Tobramycin: S  Organism: Pseudomonas aeruginosa (24 @ 08:33)      Method Type: LATOYA      -  Aztreonam: S <=4      -  Cefepime: S <=2      -  Ciprofloxacin: S <=0.25      -  Levofloxacin: S <=0.5      -  Piperacillin/Tazobactam: S <=8      -  Meropenem: S <=1

## 2024-01-23 ENCOUNTER — TRANSCRIPTION ENCOUNTER (OUTPATIENT)
Age: 89
End: 2024-01-23

## 2024-01-23 LAB
ANION GAP SERPL CALC-SCNC: 6 MMOL/L — SIGNIFICANT CHANGE UP (ref 5–17)
BUN SERPL-MCNC: 24 MG/DL — HIGH (ref 7–23)
CALCIUM SERPL-MCNC: 8.7 MG/DL — SIGNIFICANT CHANGE UP (ref 8.4–10.5)
CHLORIDE SERPL-SCNC: 100 MMOL/L — SIGNIFICANT CHANGE UP (ref 96–108)
CO2 SERPL-SCNC: 32 MMOL/L — HIGH (ref 22–31)
CREAT SERPL-MCNC: 0.68 MG/DL — SIGNIFICANT CHANGE UP (ref 0.5–1.3)
EGFR: 89 ML/MIN/1.73M2 — SIGNIFICANT CHANGE UP
GLUCOSE BLDC GLUCOMTR-MCNC: 118 MG/DL — HIGH (ref 70–99)
GLUCOSE BLDC GLUCOMTR-MCNC: 127 MG/DL — HIGH (ref 70–99)
GLUCOSE BLDC GLUCOMTR-MCNC: 131 MG/DL — HIGH (ref 70–99)
GLUCOSE BLDC GLUCOMTR-MCNC: 147 MG/DL — HIGH (ref 70–99)
GLUCOSE SERPL-MCNC: 128 MG/DL — HIGH (ref 70–99)
HCT VFR BLD CALC: 31.2 % — LOW (ref 39–50)
HGB BLD-MCNC: 10.2 G/DL — LOW (ref 13–17)
MAGNESIUM SERPL-MCNC: 1.9 MG/DL — SIGNIFICANT CHANGE UP (ref 1.6–2.6)
MCHC RBC-ENTMCNC: 31.8 PG — SIGNIFICANT CHANGE UP (ref 27–34)
MCHC RBC-ENTMCNC: 32.7 GM/DL — SIGNIFICANT CHANGE UP (ref 32–36)
MCV RBC AUTO: 97.2 FL — SIGNIFICANT CHANGE UP (ref 80–100)
NRBC # BLD: 0 /100 WBCS — SIGNIFICANT CHANGE UP (ref 0–0)
PHOSPHATE SERPL-MCNC: 2.5 MG/DL — SIGNIFICANT CHANGE UP (ref 2.5–4.5)
PLATELET # BLD AUTO: 283 K/UL — SIGNIFICANT CHANGE UP (ref 150–400)
POTASSIUM SERPL-MCNC: 4.3 MMOL/L — SIGNIFICANT CHANGE UP (ref 3.5–5.3)
POTASSIUM SERPL-SCNC: 4.3 MMOL/L — SIGNIFICANT CHANGE UP (ref 3.5–5.3)
RBC # BLD: 3.21 M/UL — LOW (ref 4.2–5.8)
RBC # FLD: 13 % — SIGNIFICANT CHANGE UP (ref 10.3–14.5)
SODIUM SERPL-SCNC: 138 MMOL/L — SIGNIFICANT CHANGE UP (ref 135–145)
WBC # BLD: 7.79 K/UL — SIGNIFICANT CHANGE UP (ref 3.8–10.5)
WBC # FLD AUTO: 7.79 K/UL — SIGNIFICANT CHANGE UP (ref 3.8–10.5)

## 2024-01-23 PROCEDURE — 99233 SBSQ HOSP IP/OBS HIGH 50: CPT

## 2024-01-23 RX ADMIN — POLYETHYLENE GLYCOL 3350 17 GRAM(S): 17 POWDER, FOR SOLUTION ORAL at 11:22

## 2024-01-23 RX ADMIN — SODIUM CHLORIDE 3 MILLILITER(S): 9 INJECTION INTRAMUSCULAR; INTRAVENOUS; SUBCUTANEOUS at 13:02

## 2024-01-23 RX ADMIN — Medication 4 MILLILITER(S): at 17:31

## 2024-01-23 RX ADMIN — Medication 1 MILLIGRAM(S): at 22:32

## 2024-01-23 RX ADMIN — CEFEPIME 100 MILLIGRAM(S): 1 INJECTION, POWDER, FOR SOLUTION INTRAMUSCULAR; INTRAVENOUS at 05:31

## 2024-01-23 RX ADMIN — SODIUM CHLORIDE 3 MILLILITER(S): 9 INJECTION INTRAMUSCULAR; INTRAVENOUS; SUBCUTANEOUS at 17:30

## 2024-01-23 RX ADMIN — ENOXAPARIN SODIUM 40 MILLIGRAM(S): 100 INJECTION SUBCUTANEOUS at 09:03

## 2024-01-23 RX ADMIN — CEFEPIME 100 MILLIGRAM(S): 1 INJECTION, POWDER, FOR SOLUTION INTRAMUSCULAR; INTRAVENOUS at 22:32

## 2024-01-23 RX ADMIN — SODIUM CHLORIDE 3 MILLILITER(S): 9 INJECTION INTRAMUSCULAR; INTRAVENOUS; SUBCUTANEOUS at 22:32

## 2024-01-23 RX ADMIN — SODIUM CHLORIDE 3 MILLILITER(S): 9 INJECTION INTRAMUSCULAR; INTRAVENOUS; SUBCUTANEOUS at 05:31

## 2024-01-23 RX ADMIN — CHLORHEXIDINE GLUCONATE 1 APPLICATION(S): 213 SOLUTION TOPICAL at 06:45

## 2024-01-23 RX ADMIN — SODIUM CHLORIDE 3 MILLILITER(S): 9 INJECTION INTRAMUSCULAR; INTRAVENOUS; SUBCUTANEOUS at 09:03

## 2024-01-23 RX ADMIN — SODIUM CHLORIDE 3 MILLILITER(S): 9 INJECTION INTRAMUSCULAR; INTRAVENOUS; SUBCUTANEOUS at 02:05

## 2024-01-23 RX ADMIN — CEFEPIME 100 MILLIGRAM(S): 1 INJECTION, POWDER, FOR SOLUTION INTRAMUSCULAR; INTRAVENOUS at 13:02

## 2024-01-23 RX ADMIN — Medication 4 MILLILITER(S): at 05:31

## 2024-01-23 NOTE — PROGRESS NOTE ADULT - ASSESSMENT
Assessment and Plan:  KUSUM GROVE is a 88M with history of known R sided laryngeal mass (known to Dr. Crisostomo, biopsy R piriform sinus 5/2023 pos. for SCC, now s/p RT, PEG-dependent) presenting with worsening shortness of breath for past month and new imaging findings on CT scan from one week ago in [facility] concerning for new laryngeal mass. FOE: large ulcerative lesion R arytenoid/FVF obscuring view of R TVF but likely immobile, L nodular arytenoid mass, L TVF mobile. s/p tracheostomy. After GOC discussion 1/19 patient and family will not pursue any surgical treatment at this time. Plan to return home with trach supplies once delivered.     Plan:  - Family and son trach teaching ongoing  - Tube feeds - bolus feeds   - Has 7.5 Portex uncuffed tracheostomy tube in place   - Use soft suction/red rubber catheter for suctioning to avoid suction trauama  - Continue TC  - Will discuss at tumor board monday 1/23 but family has indicated they wish to not pursue surgery  - Coordinate with social work for delivery of trach supplies to [facility] - will need procurement of suction canister here in  prior to leaving - per  can be ordered and delivered on day of discharge  - ID recs appreciated; total 10 day duration of abx 1/16-1/25, on Cefepime inpatient; transition to Lefofloxacin 75mg liquid PEG q8h upon discharge     [facility] at [phone] with any questions/concerns.    Nica Salinas PA-C  01-23-24 @ 07:32

## 2024-01-23 NOTE — DISCHARGE NOTE PROVIDER - NSDCFUADDINST_GEN_ALL_CORE_FT
ENT Discharge Instructions  * please clean or change inner cannula 1-2 times a day, please place dressing under tracheostomy tube,  suction as needed   *Please call your doctor or nurse practitioner if you have increased pain, swelling, redness, or drainage from the incision site.  * do not submerge tracheostomy tube in water   *You may shower      Activity:  - fatigue is common after surgery, rest if you feel tired   -Please get plenty of rest, continue to ambulate several times per day, and drink adequate amounts of fluids.   -Avoid lifting weights greater than 5-10 lbs until you follow-up with your surgeon, who will instruct you further regarding activity restrictions.  - Walking is recommended, ambulate as tolerate    Medications: Please resume all regular home medications unless specifically advised not to take a particular medication. Also, please take any new medications as prescribed.   - pain medications can cause constipation, you should eat a high fiber diet and may take a stool softener while on pain meds       Warning Signs:  Please call your doctor or nurse practitioner if you experience the following:  *You experience new chest pain, pressure, squeezing or tightness.  *New or worsening cough, shortness of breath, or wheeze.  *If you are vomiting and cannot keep down fluids or your medications.  *You are getting dehydrated due to continued vomiting, diarrhea, or other reasons. Signs of dehydration include dry mouth, rapid heartbeat, or feeling dizzy or faint when standing.  *Your pain is not improving within 8-12 hours or is not gone within 24 hours.  *You have shaking chills, or fever greater than 101.5 degrees Fahrenheit or 38 degrees Celsius.  *Any change in your symptoms, or any new symptoms that concern you.     PLEASE CALL THE OFFICE WITH ANY QUESTIONS OR CONCERNS:

## 2024-01-23 NOTE — DISCHARGE NOTE PROVIDER - CARE PROVIDER_API CALL
Damion Crisostomo  Otolaryngology  [address]  [place] [address]  Phone: [phone]  Fax: [phone]  Follow Up Time:

## 2024-01-23 NOTE — PROGRESS NOTE ADULT - SUBJECTIVE AND OBJECTIVE BOX
OTOLARYNGOLOGY (ENT) PROGRESS NOTE    PATIENT: KUSUM GROVE  MRN: [id number]  : 35  [id number]  DATE OF SERVICE:  24        Subjective/ Interval:     ID:KUSUM GROVE is an 88M with history of known R sided laryngeal mass (known to Dr. 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 Patient lives and receives care in [facility]. He reports increased cough and mucous production associated with intermittent episodes of worsening shortness of breath for the past month. This has prompted ED visits for evaluation and discharge with steroid courses, which temporarily improved his breathing difficulty, however, patient also has glaucoma and has been advised by his ophthalmologist to avoid futures courses of steroids. He has had a hoarse, breathy voice for the past six months. Son in law reports significant weight loss over past few months. He is PEG-dependent and does not have any oral intake. Non-smoker, social alcohol use.      Subjective/ Interval:   : AFVSS on RA ON. Patient seen and evaluated this am. Pt with inspiratory stridor and increased secretions. Pt taken for a trach, DL with biopsy. Pt tolerated surgery well and was brought to SICU.  : AFVSS on TC. Patient seen and evaluated this am. Trach cuff deflated. Trach site dry. Mucous suctioned from trach.  : AFVSS on TC. Patient seen and evaluated this am doing well. Cuff remains deflated. Trach site dry. Some mucous suctioned from trach. Pt seen spitting up white-tinged mucous.  1/15: AFVSS on TC. Patient seen and evaluated this am doing well. Cuff remains deflated. Trach site dry. Some secretions suctioned from the trach site.  : patient seen this morning, trach sutured in place, continues to suction thick secretions, will start nebulized saline today   : Patient seen this morning, trach in place with thick secretions, continue IV abx,  continue nebulized saline meds, continue trach teaching with family   : Patient seen this morning,  trach in place.  continue teaching pending conversation with tumor board Monday, pain controlled, ambulating well   ; patient seen this morning, trach in place will remove sutures today, Dr. hudson ND Dr. Crisostomo will had goals of care discussion today with patient   : patient seen on morning rounds. Cuffless trach in place, secured by trach collar. Still with thick secretions requiring frequent suctioning. reports able to ambulate yesterday. Had GOC discussion and will not pursue surgical management.   : patient seen on morning rounds. Cuffless trach in place, secured by trach collar. Secretions noted with small streaks of blood overnight. Ambulating, moving bowels, voiding freely. No new issues.   : AFVSS. Patient seen and evaluated on am rounds. Cuffless trach in place and secure. Secretions noted, pt suctioned. Pt ambulating.  : patient seen this morning, no overnight events, patient was suctioned, secretions bit thinner, patient continues to ambulate     ALLERGIES:  penicillin (Rash)      MEDICATIONS:  Antiinfectives:   cefepime   IVPB 2000 milliGRAM(s) IV Intermittent every 8 hours    IV fluids:  dextrose 5%. 1000 milliLiter(s) IV Continuous <Continuous>  dextrose 5%. 1000 milliLiter(s) IV Continuous <Continuous>    Hematologic/Anticoagulation:  enoxaparin Injectable 40 milliGRAM(s) SubCutaneous every 24 hours    Pain medications/Neuro:  acetaminophen   Oral Liquid .. 650 milliGRAM(s) Enteral Tube every 6 hours PRN  ondansetron Injectable 4 milliGRAM(s) IV Push every 6 hours PRN    Endocrine Medications:   dextrose 50% Injectable 12.5 Gram(s) IV Push once  dextrose 50% Injectable 25 Gram(s) IV Push once  dextrose 50% Injectable 25 Gram(s) IV Push once  dextrose Oral Gel 15 Gram(s) Oral once PRN  glucagon  Injectable 1 milliGRAM(s) IntraMuscular once  insulin lispro (ADMELOG) corrective regimen sliding scale   SubCutaneous every 6 hours    All other standing medications:   acetylcysteine 20%  Inhalation 4 milliLiter(s) Inhalation two times a day  chlorhexidine 4% Liquid 1 Application(s) Topical <User Schedule>  doxazosin 1 milliGRAM(s) Oral at bedtime  influenza  Vaccine (HIGH DOSE) 0.7 milliLiter(s) IntraMuscular once  polyethylene glycol 3350 17 Gram(s) Oral daily  sodium chloride 0.9% for Nebulization 3 milliLiter(s) Nebulizer every 4 hours    All other PRN medications:  albuterol    90 MICROgram(s) HFA Inhaler 2 Puff(s) Inhalation every 6 hours PRN    Vital Signs Last 24 Hrs  T(C): 36.6 (2024 05:00), Max: 36.7 (2024 14:02)  T(F): 97.9 (2024 05:00), Max: 98 (2024 14:02)  HR: 72 (2024 04:25) (70 - 86)  BP: 126/66 (2024 04:25) (118/58 - 133/69)  BP(mean): 89 (2024 04:25) (79 - 94)  RR: 19 (2024 04:25) (16 - 19)  SpO2: 99% (2024 04:25) (95% - 99%)    Parameters below as of 2024 04:25  Patient On (Oxygen Delivery Method): tracheostomy collar  O2 Flow (L/min): 10  O2 Concentration (%): 35       @ 07:01  -   @ 07:00  --------------------------------------------------------  IN:    Enteral Tube Flush: 650 mL    IV PiggyBack: 150 mL    Jevity 1.2: 1777 mL  Total IN: 2577 mL    OUT:    Voided (mL): 250 mL  Total OUT: 250 mL    Total NET: 2327 mL        PHYSICAL EXAM:  GEN: appears stated age, NAD  NEURO: alert & oriented x 4/4  HEENT: face symmetric, cuffless trach in place with trach ties, secretions suctioned  CVS: regular rate and rhythm  Pulm: normal respiratory excursions, not tachypneic, no labored breathing  Abd: non-distended  Ext: moving all four extremities, no peripheral edema noted    LABS                       10.2   7.79  )-----------( 283      ( 2024 05:30 )             31.2    01-    138  |  100  |  24<H>  ----------------------------<  128<H>  4.3   |  32<H>  |  0.68    Ca    8.7      2024 05:30  Phos  2.5       Mg     1.9              Urinalysis Basic - ( 2024 05:30 )    Color: x / Appearance: x / SG: x / pH: x  Gluc: 128 mg/dL / Ketone: x  / Bili: x / Urobili: x   Blood: x / Protein: x / Nitrite: x   Leuk Esterase: x / RBC: x / WBC x   Sq Epi: x / Non Sq Epi: x / Bacteria: x      Endocrine Panel-  Calcium: 8.7 mg/dL ( @ 05:30)          MICROBIOLOGY:  Culture Results:   Numerous Pseudomonas aeruginosa  Accompanied by normal respiratory jose manuel (01-15-24 @ 15:29)

## 2024-01-23 NOTE — DISCHARGE NOTE PROVIDER - CARE PROVIDERS DIRECT ADDRESSES
[email or web address]

## 2024-01-23 NOTE — DISCHARGE NOTE PROVIDER - NSDCCPCAREPLAN_GEN_ALL_CORE_FT
PRINCIPAL DISCHARGE DIAGNOSIS  Diagnosis: Laryngeal squamous cell carcinoma  Assessment and Plan of Treatment:

## 2024-01-23 NOTE — DISCHARGE NOTE PROVIDER - HOSPITAL COURSE
OTOLARYNGOLOGY (ENT) DISCHARGE SUMMARY    PATIENT: KUSUM GROVE               : 35  MRN: [id number]  ADMISSION DATE: 24  Discharge Date: 24 @ 07:43  Attending Physician: Damion Crisostomo    Admission Diagnosis:  LARYNGEAL NODULE        Status post:Creation, tracheostomy, open    Direct laryngoscopy with biopsy         Chronic Conditions:  [place] (hard of hearing)    HTN (hypertension)    Glaucoma    Deep vein thrombosis (DVT)    Dysphagia        HPI:  ID:KUSUM GROVE is an 88M with history of known R sided laryngeal mass (known to Dr. 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 Patient lives and receives care in [facility]. He reports increased cough and mucous production associated with intermittent episodes of worsening shortness of breath for the past month. This has prompted ED visits for evaluation and discharge with steroid courses, which temporarily improved his breathing difficulty, however, patient also has glaucoma and has been advised by his ophthalmologist to avoid futures courses of steroids. He has had a hoarse, breathy voice for the past six months. Son in law reports significant weight loss over past few months. He is PEG-dependent and does not have any oral intake. Non-smoker, social alcohol use. s/p tracheostomy on . Patient does not want any additional surgical treatment.       Subjective/ Interval:   : AFVSS on RA ON. Patient seen and evaluated this am. Pt with inspiratory stridor and increased secretions. Pt taken for a trach, DL with biopsy. Pt tolerated surgery well and was brought to SICU.  : AFVSS on TC. Patient seen and evaluated this am. Trach cuff deflated. Trach site dry. Mucous suctioned from trach.  : AFVSS on TC. Patient seen and evaluated this am doing well. Cuff remains deflated. Trach site dry. Some mucous suctioned from trach. Pt seen spitting up white-tinged mucous.  1/15: AFVSS on TC. Patient seen and evaluated this am doing well. Cuff remains deflated. Trach site dry. Some secretions suctioned from the trach site.  : patient seen this morning, trach sutured in place, continues to suction thick secretions, will start nebulized saline today   : Patient seen this morning, trach in place with thick secretions, continue IV abx,  continue nebulized saline meds, continue trach teaching with family   : Patient seen this morning,  trach in place.  continue teaching pending conversation with tumor board Monday, pain controlled, ambulating well   ; patient seen this morning, trach in place will remove sutures today, Dr. hudson ND Dr. Crisostomo will had goals of care discussion today with patient   : patient seen on morning rounds. Cuffless trach in place, secured by trach collar. Still with thick secretions requiring frequent suctioning. reports able to ambulate yesterday. Had GOC discussion and will not pursue surgical management.   : patient seen on morning rounds. Cuffless trach in place, secured by trach collar. Secretions noted with small streaks of blood overnight. Ambulating, moving bowels, voiding freely. No new issues.   : AFVSS. Patient seen and evaluated on am rounds. Cuffless trach in place and secure. Secretions noted, pt suctioned. Pt ambulating.  : patient seen this morning, no overnight events, patient was suctioned, secretions bit thinner, patient continues to ambulate, will plan for supplies to be delivered bedside         Disposition: Home with family    Discharge Condition: Stable  OTOLARYNGOLOGY (ENT) DISCHARGE SUMMARY    PATIENT: KUSUM GROVE               : 35  MRN: [id number]  ADMISSION DATE: 24  Discharge Date: 24 @ 07:43  Attending Physician: Damion Crisostomo    Admission Diagnosis:  LARYNGEAL NODULE        Status post:Creation, tracheostomy, open    Direct laryngoscopy with biopsy         Chronic Conditions:  [place] (hard of hearing)    HTN (hypertension)    Glaucoma    Deep vein thrombosis (DVT)    Dysphagia        HPI:  ID:KUSUM GROVE is an 88M with history of known R sided laryngeal mass (known to Dr. 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 Patient lives and receives care in [facility]. He reports increased cough and mucous production associated with intermittent episodes of worsening shortness of breath for the past month. This has prompted ED visits for evaluation and discharge with steroid courses, which temporarily improved his breathing difficulty, however, patient also has glaucoma and has been advised by his ophthalmologist to avoid futures courses of steroids. He has had a hoarse, breathy voice for the past six months. Son in law reports significant weight loss over past few months. He is PEG-dependent and does not have any oral intake. Non-smoker, social alcohol use. s/p tracheostomy on . Patient does not want any additional surgical treatment.       Subjective/ Interval:   : AFVSS on RA ON. Patient seen and evaluated this am. Pt with inspiratory stridor and increased secretions. Pt taken for a trach, DL with biopsy. Pt tolerated surgery well and was brought to SICU.  : AFVSS on TC. Patient seen and evaluated this am. Trach cuff deflated. Trach site dry. Mucous suctioned from trach.  : AFVSS on TC. Patient seen and evaluated this am doing well. Cuff remains deflated. Trach site dry. Some mucous suctioned from trach. Pt seen spitting up white-tinged mucous.  1/15: AFVSS on TC. Patient seen and evaluated this am doing well. Cuff remains deflated. Trach site dry. Some secretions suctioned from the trach site.  : patient seen this morning, trach sutured in place, continues to suction thick secretions, will start nebulized saline today   : Patient seen this morning, trach in place with thick secretions, continue IV abx,  continue nebulized saline meds, continue trach teaching with family   : Patient seen this morning,  trach in place.  continue teaching pending conversation with tumor board Monday, pain controlled, ambulating well   ; patient seen this morning, trach in place will remove sutures today, Dr. hudson ND Dr. Crisostomo will had goals of care discussion today with patient   : patient seen on morning rounds. Cuffless trach in place, secured by trach collar. Still with thick secretions requiring frequent suctioning. reports able to ambulate yesterday. Had GOC discussion and will not pursue surgical management.   : patient seen on morning rounds. Cuffless trach in place, secured by trach collar. Secretions noted with small streaks of blood overnight. Ambulating, moving bowels, voiding freely. No new issues.   : AFVSS. Patient seen and evaluated on am rounds. Cuffless trach in place and secure. Secretions noted, pt suctioned. Pt ambulating.  : patient seen this morning, no overnight events, patient was suctioned, secretions bit thinner, patient continues to ambulate, will plan for supplies to be delivered bedside   : Patient seen this morning ans suctioned moderate amount of secretions.  allevyn foam changed under trach, pending auth for insurance to deliver supplies   : Pending supplies delivery bedside, trach care performed,   : Pending supplies delivery, trach care performed, moderate amount of secretions   : pending trach supplies. trach care performed. thin secretions, able to clear. satting well on trach collar.  : pending trach supplies. trach care performed. thin secretions.  : pending trach supplies, trach care performed. Pt doing well.  : Patient seen bedside trach care performed, doing well, pending supplies   : Pt doing well. 18 beats of vtach ON, pt remained asymptomatic and returned to sinus rhythm spontaneously. Patient seen bedside. Trach care performed.         Disposition: Home with family    Discharge Condition: Stable  OTOLARYNGOLOGY (ENT) DISCHARGE SUMMARY    PATIENT: KUSUM GROVE               : 35  MRN: [id number]  ADMISSION DATE: 24  Discharge Date: 24  Attending Physician: Damion Crisostomo    Admission Diagnosis:  LARYNGEAL NODULE        Status post:Creation, tracheostomy, open    Direct laryngoscopy with biopsy         Chronic Conditions:  [place] (hard of hearing)    HTN (hypertension)    Glaucoma    Deep vein thrombosis (DVT)    Dysphagia        HPI:  ID:KUSUM GROVE is an 88M with history of known R sided laryngeal mass (known to Dr. 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 Patient lives and receives care in [facility]. He reports increased cough and mucous production associated with intermittent episodes of worsening shortness of breath for the past month. This has prompted ED visits for evaluation and discharge with steroid courses, which temporarily improved his breathing difficulty, however, patient also has glaucoma and has been advised by his ophthalmologist to avoid futures courses of steroids. He has had a hoarse, breathy voice for the past six months. Son in law reports significant weight loss over past few months. He is PEG-dependent and does not have any oral intake. Non-smoker, social alcohol use. s/p tracheostomy on . Patient does not want any additional surgical treatment.       Subjective/ Interval:   : AFVSS on RA ON. Patient seen and evaluated this am. Pt with inspiratory stridor and increased secretions. Pt taken for a trach, DL with biopsy. Pt tolerated surgery well and was brought to SICU.  : AFVSS on TC. Patient seen and evaluated this am. Trach cuff deflated. Trach site dry. Mucous suctioned from trach.  : AFVSS on TC. Patient seen and evaluated this am doing well. Cuff remains deflated. Trach site dry. Some mucous suctioned from trach. Pt seen spitting up white-tinged mucous.  1/15: AFVSS on TC. Patient seen and evaluated this am doing well. Cuff remains deflated. Trach site dry. Some secretions suctioned from the trach site.  : patient seen this morning, trach sutured in place, continues to suction thick secretions, will start nebulized saline today   : Patient seen this morning, trach in place with thick secretions, continue IV abx,  continue nebulized saline meds, continue trach teaching with family   : Patient seen this morning,  trach in place.  continue teaching pending conversation with tumor board Monday, pain controlled, ambulating well   ; patient seen this morning, trach in place will remove sutures today, Dr. hudson ND Dr. Crisostomo will had goals of care discussion today with patient   : patient seen on morning rounds. Cuffless trach in place, secured by trach collar. Still with thick secretions requiring frequent suctioning. reports able to ambulate yesterday. Had GOC discussion and will not pursue surgical management.   : patient seen on morning rounds. Cuffless trach in place, secured by trach collar. Secretions noted with small streaks of blood overnight. Ambulating, moving bowels, voiding freely. No new issues.   : AFVSS. Patient seen and evaluated on am rounds. Cuffless trach in place and secure. Secretions noted, pt suctioned. Pt ambulating.  : patient seen this morning, no overnight events, patient was suctioned, secretions bit thinner, patient continues to ambulate, will plan for supplies to be delivered bedside   : Patient seen this morning ans suctioned moderate amount of secretions.  allevyn foam changed under trach, pending auth for insurance to deliver supplies   : Pending supplies delivery bedside, trach care performed,   : Pending supplies delivery, trach care performed, moderate amount of secretions   : pending trach supplies. trach care performed. thin secretions, able to clear. satting well on trach collar.  : pending trach supplies. trach care performed. thin secretions.  : pending trach supplies, trach care performed. Pt doing well.  : Patient seen bedside trach care performed, doing well, pending supplies   : Pt doing well. 18 beats of vtach ON, pt remained asymptomatic and returned to sinus rhythm spontaneously. Patient seen bedside. Trach care performed.         Disposition: Home with family    Discharge Condition: Stable

## 2024-01-23 NOTE — DISCHARGE NOTE PROVIDER - DETAILS OF MALNUTRITION DIAGNOSIS/DIAGNOSES
This patient has been assessed with a concern for Malnutrition and was treated during this hospitalization for the following Nutrition diagnosis/diagnoses:     -  01/13/2024: Severe protein-calorie malnutrition   -  01/13/2024: Underweight (BMI < 19)

## 2024-01-24 LAB
GLUCOSE BLDC GLUCOMTR-MCNC: 111 MG/DL — HIGH (ref 70–99)
GLUCOSE BLDC GLUCOMTR-MCNC: 121 MG/DL — HIGH (ref 70–99)
GLUCOSE BLDC GLUCOMTR-MCNC: 126 MG/DL — HIGH (ref 70–99)

## 2024-01-24 PROCEDURE — 99233 SBSQ HOSP IP/OBS HIGH 50: CPT

## 2024-01-24 RX ADMIN — CEFEPIME 100 MILLIGRAM(S): 1 INJECTION, POWDER, FOR SOLUTION INTRAMUSCULAR; INTRAVENOUS at 05:58

## 2024-01-24 RX ADMIN — SODIUM CHLORIDE 3 MILLILITER(S): 9 INJECTION INTRAMUSCULAR; INTRAVENOUS; SUBCUTANEOUS at 09:28

## 2024-01-24 RX ADMIN — ENOXAPARIN SODIUM 40 MILLIGRAM(S): 100 INJECTION SUBCUTANEOUS at 09:29

## 2024-01-24 RX ADMIN — SODIUM CHLORIDE 3 MILLILITER(S): 9 INJECTION INTRAMUSCULAR; INTRAVENOUS; SUBCUTANEOUS at 05:58

## 2024-01-24 RX ADMIN — SODIUM CHLORIDE 3 MILLILITER(S): 9 INJECTION INTRAMUSCULAR; INTRAVENOUS; SUBCUTANEOUS at 22:00

## 2024-01-24 RX ADMIN — Medication 1 MILLIGRAM(S): at 22:01

## 2024-01-24 RX ADMIN — CEFEPIME 100 MILLIGRAM(S): 1 INJECTION, POWDER, FOR SOLUTION INTRAMUSCULAR; INTRAVENOUS at 14:26

## 2024-01-24 RX ADMIN — SODIUM CHLORIDE 3 MILLILITER(S): 9 INJECTION INTRAMUSCULAR; INTRAVENOUS; SUBCUTANEOUS at 03:01

## 2024-01-24 RX ADMIN — SODIUM CHLORIDE 3 MILLILITER(S): 9 INJECTION INTRAMUSCULAR; INTRAVENOUS; SUBCUTANEOUS at 18:48

## 2024-01-24 RX ADMIN — Medication 4 MILLILITER(S): at 05:57

## 2024-01-24 RX ADMIN — CEFEPIME 100 MILLIGRAM(S): 1 INJECTION, POWDER, FOR SOLUTION INTRAMUSCULAR; INTRAVENOUS at 21:59

## 2024-01-24 RX ADMIN — SODIUM CHLORIDE 3 MILLILITER(S): 9 INJECTION INTRAMUSCULAR; INTRAVENOUS; SUBCUTANEOUS at 14:27

## 2024-01-24 RX ADMIN — Medication 4 MILLILITER(S): at 18:48

## 2024-01-24 NOTE — PROGRESS NOTE ADULT - ASSESSMENT
Assessment and Plan:  KUSUM GROVE is a 88M with history of known R sided laryngeal mass (known to Dr. Crisostomo, biopsy R piriform sinus 5/2023 pos. for SCC, now s/p RT, PEG-dependent) presenting with worsening shortness of breath for past month and new imaging findings on CT scan from one week ago in [facility] concerning for new laryngeal mass. FOE: large ulcerative lesion R arytenoid/FVF obscuring view of R TVF but likely immobile, L nodular arytenoid mass, L TVF mobile. s/p tracheostomy. After GOC discussion 1/19 patient and family will not pursue any surgical treatment at this time. Plan to return home with trach supplies once delivered.     Plan:  - encourage ambulation 4-5x a day   - Family and son trach teaching ongoing  - Tube feeds - bolus feeds   - Has 7.5 Portex uncuffed tracheostomy tube in place   - Use soft suction/red rubber catheter for suctioning to avoid suction trauama  - Continue TC  - Patient and family do not desire any additional surgical treatment   - Coordinate with  for delivery of trach supplies to [facility] - will need procurement of suction canister here in  prior to leaving - per  can be ordered and delivered on day of discharge  - ID recs appreciated; total 10 day duration of abx 1/16-1/25, on Cefepime inpatient - ends tomorrow     [facility] at [phone] with any questions/concerns.    Nica Salinas PA-C  01-24-24 @ 07:40       Assessment and Plan:  KUSUM GROVE is a 88M with history of known R sided laryngeal mass (known to Dr. 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 FOE: large ulcerative lesion R arytenoid/FVF obscuring view of R TVF but likely immobile, L nodular arytenoid mass, L TVF mobile. s/p tracheostomy. After GOC discussion 1/19 patient and family will not pursue any surgical treatment at this time. Plan to return home with trach supplies once delivered.     Plan:  - encourage ambulation 4-5x a day   - Family and son trach teaching ongoing  - Tube feeds - bolus feeds   - Has 7.5 Portex uncuffed tracheostomy tube in place   - Use soft suction/red rubber catheter for suctioning to avoid suction trauma   - Continue TC  - Patient and family do not desire any additional surgical treatment   - Coordinate with  for delivery of trach supplies to [facility] - will need procurement of suction canister here in  prior to leaving - per  can be ordered and delivered on day of discharge  - ID recs appreciated; total 10 day duration of abx 1/16-1/25, on Cefepime inpatient - ends tomorrow     [facility] at [phone] with any questions/concerns.    Nica Salinas PA-C  01-24-24 @ 07:40

## 2024-01-24 NOTE — PROGRESS NOTE ADULT - SUBJECTIVE AND OBJECTIVE BOX
OTOLARYNGOLOGY (ENT) PROGRESS NOTE    PATIENT: KUSUM GROVE  MRN: [id number]  : 35  [id number]  DATE OF SERVICE:  24  			           ID:KUSUM GROVE is an 88M with history of known R sided laryngeal mass (known to Dr. 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 Patient lives and receives care in [facility]. He reports increased cough and mucous production associated with intermittent episodes of worsening shortness of breath for the past month. This has prompted ED visits for evaluation and discharge with steroid courses, which temporarily improved his breathing difficulty, however, patient also has glaucoma and has been advised by his ophthalmologist to avoid futures courses of steroids. He has had a hoarse, breathy voice for the past six months. Son in law reports significant weight loss over past few months. He is PEG-dependent and does not have any oral intake. Non-smoker, social alcohol use.      Subjective/ Interval:   : AFVSS on RA ON. Patient seen and evaluated this am. Pt with inspiratory stridor and increased secretions. Pt taken for a trach, DL with biopsy. Pt tolerated surgery well and was brought to SICU.  : AFVSS on TC. Patient seen and evaluated this am. Trach cuff deflated. Trach site dry. Mucous suctioned from trach.  : AFVSS on TC. Patient seen and evaluated this am doing well. Cuff remains deflated. Trach site dry. Some mucous suctioned from trach. Pt seen spitting up white-tinged mucous.  1/15: AFVSS on TC. Patient seen and evaluated this am doing well. Cuff remains deflated. Trach site dry. Some secretions suctioned from the trach site.  : patient seen this morning, trach sutured in place, continues to suction thick secretions, will start nebulized saline today   : Patient seen this morning, trach in place with thick secretions, continue IV abx,  continue nebulized saline meds, continue trach teaching with family   : Patient seen this morning,  trach in place.  continue teaching pending conversation with tumor board Monday, pain controlled, ambulating well   ; patient seen this morning, trach in place will remove sutures today, Dr. hudson ND Dr. Crisostomo will had goals of care discussion today with patient   : patient seen on morning rounds. Cuffless trach in place, secured by trach collar. Still with thick secretions requiring frequent suctioning. reports able to ambulate yesterday. Had GOC discussion and will not pursue surgical management.   : patient seen on morning rounds. Cuffless trach in place, secured by trach collar. Secretions noted with small streaks of blood overnight. Ambulating, moving bowels, voiding freely. No new issues.   : AFVSS. Patient seen and evaluated on am rounds. Cuffless trach in place and secure. Secretions noted, pt suctioned. Pt ambulating.  : patient seen this morning, no overnight events, patient was suctioned, secretions bit thinner, patient continues to ambulate   : Patient seen this morning ans suctioned moderate amount of secretions.  allevayn foam changed under trach, pending auth for insurance to deliver supplies   ALLERGIES:  penicillin (Rash)      MEDICATIONS:  Antiinfectives:   cefepime   IVPB 2000 milliGRAM(s) IV Intermittent every 8 hours    IV fluids:  dextrose 5%. 1000 milliLiter(s) IV Continuous <Continuous>  dextrose 5%. 1000 milliLiter(s) IV Continuous <Continuous>    Hematologic/Anticoagulation:  enoxaparin Injectable 40 milliGRAM(s) SubCutaneous every 24 hours    Pain medications/Neuro:  acetaminophen   Oral Liquid .. 650 milliGRAM(s) Enteral Tube every 6 hours PRN  ondansetron Injectable 4 milliGRAM(s) IV Push every 6 hours PRN    Endocrine Medications:   dextrose 50% Injectable 12.5 Gram(s) IV Push once  dextrose 50% Injectable 25 Gram(s) IV Push once  dextrose 50% Injectable 25 Gram(s) IV Push once  dextrose Oral Gel 15 Gram(s) Oral once PRN  glucagon  Injectable 1 milliGRAM(s) IntraMuscular once  insulin lispro (ADMELOG) corrective regimen sliding scale   SubCutaneous every 6 hours    All other standing medications:   acetylcysteine 20%  Inhalation 4 milliLiter(s) Inhalation two times a day  chlorhexidine 4% Liquid 1 Application(s) Topical <User Schedule>  doxazosin 1 milliGRAM(s) Oral at bedtime  influenza  Vaccine (HIGH DOSE) 0.7 milliLiter(s) IntraMuscular once  polyethylene glycol 3350 17 Gram(s) Oral daily  sodium chloride 0.9% for Nebulization 3 milliLiter(s) Nebulizer every 4 hours    All other PRN medications:  albuterol    90 MICROgram(s) HFA Inhaler 2 Puff(s) Inhalation every 6 hours PRN    Vital Signs Last 24 Hrs  T(C): 36.7 (2024 04:51), Max: 36.8 (2024 09:00)  T(F): 98 (2024 04:51), Max: 98.3 (2024 09:00)  HR: 68 (2024 04:01) (68 - 74)  BP: 131/67 (2024 04:01) (108/55 - 131/67)  BP(mean): 92 (2024 04:01) (72 - 92)  RR: 18 (2024 04:01) (18 - 20)  SpO2: 100% (2024 04:01) (96% - 100%)    Parameters below as of 2024 04:01  Patient On (Oxygen Delivery Method): tracheostomy collar  O2 Flow (L/min): 10  O2 Concentration (%): 35      -23 @ 07:01  -  01-24 @ 07:00  --------------------------------------------------------  IN:    Enteral Tube Flush: 450 mL    Jevity 1.2: 950 mL  Total IN: 1400 mL    OUT:    Voided (mL): 350 mL  Total OUT: 350 mL    Total NET: 1050 mL      PHYSICAL EXAM:  GEN: appears stated age, NAD  NEURO: alert & oriented x 4/4  HEENT: face symmetric, cuffless trach in place with trach ties, secretions suctioned  CVS: regular rate and rhythm  Pulm: normal respiratory excursions, not tachypneic, no labored breathing  Abd: non-distended  Ext: moving all four extremities, no peripheral edema noted         LABS                       10.2   7.79  )-----------( 283      ( 2024 05:30 )             31.2    -    138  |  100  |  24<H>  ----------------------------<  128<H>  4.3   |  32<H>  |  0.68    Ca    8.7      2024 05:30  Phos  2.5       Mg     1.9                Coagulation Studies-     Urinalysis Basic - ( 2024 05:30 )    Color: x / Appearance: x / SG: x / pH: x  Gluc: 128 mg/dL / Ketone: x  / Bili: x / Urobili: x   Blood: x / Protein: x / Nitrite: x   Leuk Esterase: x / RBC: x / WBC x   Sq Epi: x / Non Sq Epi: x / Bacteria: x        MICROBIOLOGY:  Culture Results:   Numerous Pseudomonas aeruginosa  Accompanied by normal respiratory jose manuel (01-15-24 @ 15:29)

## 2024-01-25 LAB
ANION GAP SERPL CALC-SCNC: 7 MMOL/L — SIGNIFICANT CHANGE UP (ref 5–17)
BUN SERPL-MCNC: 22 MG/DL — SIGNIFICANT CHANGE UP (ref 7–23)
CALCIUM SERPL-MCNC: 9 MG/DL — SIGNIFICANT CHANGE UP (ref 8.4–10.5)
CHLORIDE SERPL-SCNC: 97 MMOL/L — SIGNIFICANT CHANGE UP (ref 96–108)
CO2 SERPL-SCNC: 30 MMOL/L — SIGNIFICANT CHANGE UP (ref 22–31)
CREAT SERPL-MCNC: 0.68 MG/DL — SIGNIFICANT CHANGE UP (ref 0.5–1.3)
EGFR: 89 ML/MIN/1.73M2 — SIGNIFICANT CHANGE UP
GLUCOSE BLDC GLUCOMTR-MCNC: 111 MG/DL — HIGH (ref 70–99)
GLUCOSE BLDC GLUCOMTR-MCNC: 111 MG/DL — HIGH (ref 70–99)
GLUCOSE BLDC GLUCOMTR-MCNC: 114 MG/DL — HIGH (ref 70–99)
GLUCOSE BLDC GLUCOMTR-MCNC: 118 MG/DL — HIGH (ref 70–99)
GLUCOSE BLDC GLUCOMTR-MCNC: 156 MG/DL — HIGH (ref 70–99)
GLUCOSE SERPL-MCNC: 115 MG/DL — HIGH (ref 70–99)
HCT VFR BLD CALC: 32.2 % — LOW (ref 39–50)
HGB BLD-MCNC: 10.3 G/DL — LOW (ref 13–17)
MAGNESIUM SERPL-MCNC: 1.9 MG/DL — SIGNIFICANT CHANGE UP (ref 1.6–2.6)
MCHC RBC-ENTMCNC: 31.3 PG — SIGNIFICANT CHANGE UP (ref 27–34)
MCHC RBC-ENTMCNC: 32 GM/DL — SIGNIFICANT CHANGE UP (ref 32–36)
MCV RBC AUTO: 97.9 FL — SIGNIFICANT CHANGE UP (ref 80–100)
NRBC # BLD: 0 /100 WBCS — SIGNIFICANT CHANGE UP (ref 0–0)
PHOSPHATE SERPL-MCNC: 2.6 MG/DL — SIGNIFICANT CHANGE UP (ref 2.5–4.5)
PLATELET # BLD AUTO: 306 K/UL — SIGNIFICANT CHANGE UP (ref 150–400)
POTASSIUM SERPL-MCNC: 4.3 MMOL/L — SIGNIFICANT CHANGE UP (ref 3.5–5.3)
POTASSIUM SERPL-SCNC: 4.3 MMOL/L — SIGNIFICANT CHANGE UP (ref 3.5–5.3)
RBC # BLD: 3.29 M/UL — LOW (ref 4.2–5.8)
RBC # FLD: 12.9 % — SIGNIFICANT CHANGE UP (ref 10.3–14.5)
SODIUM SERPL-SCNC: 134 MMOL/L — LOW (ref 135–145)
WBC # BLD: 7.78 K/UL — SIGNIFICANT CHANGE UP (ref 3.8–10.5)
WBC # FLD AUTO: 7.78 K/UL — SIGNIFICANT CHANGE UP (ref 3.8–10.5)

## 2024-01-25 PROCEDURE — 99233 SBSQ HOSP IP/OBS HIGH 50: CPT

## 2024-01-25 RX ADMIN — Medication 4 MILLILITER(S): at 17:01

## 2024-01-25 RX ADMIN — SODIUM CHLORIDE 3 MILLILITER(S): 9 INJECTION INTRAMUSCULAR; INTRAVENOUS; SUBCUTANEOUS at 02:32

## 2024-01-25 RX ADMIN — SODIUM CHLORIDE 3 MILLILITER(S): 9 INJECTION INTRAMUSCULAR; INTRAVENOUS; SUBCUTANEOUS at 06:46

## 2024-01-25 RX ADMIN — CEFEPIME 100 MILLIGRAM(S): 1 INJECTION, POWDER, FOR SOLUTION INTRAMUSCULAR; INTRAVENOUS at 06:45

## 2024-01-25 RX ADMIN — Medication 1: at 17:01

## 2024-01-25 RX ADMIN — Medication 4 MILLILITER(S): at 06:45

## 2024-01-25 RX ADMIN — SODIUM CHLORIDE 3 MILLILITER(S): 9 INJECTION INTRAMUSCULAR; INTRAVENOUS; SUBCUTANEOUS at 21:53

## 2024-01-25 RX ADMIN — ENOXAPARIN SODIUM 40 MILLIGRAM(S): 100 INJECTION SUBCUTANEOUS at 09:52

## 2024-01-25 RX ADMIN — SODIUM CHLORIDE 3 MILLILITER(S): 9 INJECTION INTRAMUSCULAR; INTRAVENOUS; SUBCUTANEOUS at 17:01

## 2024-01-25 RX ADMIN — CEFEPIME 100 MILLIGRAM(S): 1 INJECTION, POWDER, FOR SOLUTION INTRAMUSCULAR; INTRAVENOUS at 21:53

## 2024-01-25 RX ADMIN — SODIUM CHLORIDE 3 MILLILITER(S): 9 INJECTION INTRAMUSCULAR; INTRAVENOUS; SUBCUTANEOUS at 09:52

## 2024-01-25 RX ADMIN — Medication 1 MILLIGRAM(S): at 21:53

## 2024-01-25 NOTE — PROGRESS NOTE ADULT - ASSESSMENT
Assessment and Plan:    KUSUM GROVE is a 88M with history of known R sided laryngeal mass (known to Dr. 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 FOE: large ulcerative lesion R arytenoid/FVF obscuring view of R TVF but likely immobile, L nodular arytenoid mass, L TVF mobile. s/p tracheostomy. After GOC discussion 1/19 patient and family will not pursue any surgical treatment at this time. Plan to return home with trach supplies once delivered.     Plan:  - ABX end today   - encourage ambulation 4-5x a day   - Family and son trach teaching ongoing  - Tube feeds - bolus feeds   - Has 7.5 Portex uncuffed tracheostomy tube in place   - Use soft suction/red rubber catheter for suctioning to avoid suction trauma   - Continue TC  - Patient and family do not desire any additional surgical treatment   - Coordinate with  for delivery of trach supplies to [facility] - will need procurement of suction canister here in  prior to leaving - per  can be ordered and delivered on day of discharge        [facility] at [phone] with any questions/concerns.    Nica Salinas PA-C  01-25-24 @ 08:58

## 2024-01-25 NOTE — PROGRESS NOTE ADULT - SUBJECTIVE AND OBJECTIVE BOX
OTOLARYNGOLOGY (ENT) PROGRESS NOTE    PATIENT: KUSUM GROVE  MRN: [id number]  : 35  [id number]  DATE OF SERVICE:  24  			           ID:KUSUM GROVE is an 88M with history of known R sided laryngeal mass (known to Dr. 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 Patient lives and receives care in [facility]. He reports increased cough and mucous production associated with intermittent episodes of worsening shortness of breath for the past month. This has prompted ED visits for evaluation and discharge with steroid courses, which temporarily improved his breathing difficulty, however, patient also has glaucoma and has been advised by his ophthalmologist to avoid futures courses of steroids. He has had a hoarse, breathy voice for the past six months. Son in law reports significant weight loss over past few months. He is PEG-dependent and does not have any oral intake. Non-smoker, social alcohol use.      Subjective/ Interval:   : AFVSS on RA ON. Patient seen and evaluated this am. Pt with inspiratory stridor and increased secretions. Pt taken for a trach, DL with biopsy. Pt tolerated surgery well and was brought to SICU.  : AFVSS on TC. Patient seen and evaluated this am. Trach cuff deflated. Trach site dry. Mucous suctioned from trach.  : AFVSS on TC. Patient seen and evaluated this am doing well. Cuff remains deflated. Trach site dry. Some mucous suctioned from trach. Pt seen spitting up white-tinged mucous.  1/15: AFVSS on TC. Patient seen and evaluated this am doing well. Cuff remains deflated. Trach site dry. Some secretions suctioned from the trach site.  : patient seen this morning, trach sutured in place, continues to suction thick secretions, will start nebulized saline today   : Patient seen this morning, trach in place with thick secretions, continue IV abx,  continue nebulized saline meds, continue trach teaching with family   : Patient seen this morning,  trach in place.  continue teaching pending conversation with tumor board Monday, pain controlled, ambulating well   ; patient seen this morning, trach in place will remove sutures today, Dr. hudson ND Dr. Crisostomo will had goals of care discussion today with patient   : patient seen on morning rounds. Cuffless trach in place, secured by trach collar. Still with thick secretions requiring frequent suctioning. reports able to ambulate yesterday. Had GOC discussion and will not pursue surgical management.   : patient seen on morning rounds. Cuffless trach in place, secured by trach collar. Secretions noted with small streaks of blood overnight. Ambulating, moving bowels, voiding freely. No new issues.   : AFVSS. Patient seen and evaluated on am rounds. Cuffless trach in place and secure. Secretions noted, pt suctioned. Pt ambulating.  : patient seen this morning, no overnight events, patient was suctioned, secretions bit thinner, patient continues to ambulate   : Patient seen this morning ans suctioned moderate amount of secretions.  allevayn foam changed under trach, pending auth for insurance to deliver supplies   : Pending supplies delivery bedside, trach care performed,      ALLERGIES:  penicillin (Rash)      MEDICATIONS:  Antiinfectives:   cefepime   IVPB 2000 milliGRAM(s) IV Intermittent every 8 hours    IV fluids:  dextrose 5%. 1000 milliLiter(s) IV Continuous <Continuous>  dextrose 5%. 1000 milliLiter(s) IV Continuous <Continuous>    Hematologic/Anticoagulation:  enoxaparin Injectable 40 milliGRAM(s) SubCutaneous every 24 hours    Pain medications/Neuro:  acetaminophen   Oral Liquid .. 650 milliGRAM(s) Enteral Tube every 6 hours PRN  ondansetron Injectable 4 milliGRAM(s) IV Push every 6 hours PRN    Endocrine Medications:   dextrose 50% Injectable 12.5 Gram(s) IV Push once  dextrose 50% Injectable 25 Gram(s) IV Push once  dextrose 50% Injectable 25 Gram(s) IV Push once  dextrose Oral Gel 15 Gram(s) Oral once PRN  glucagon  Injectable 1 milliGRAM(s) IntraMuscular once  insulin lispro (ADMELOG) corrective regimen sliding scale   SubCutaneous every 6 hours    All other standing medications:   acetylcysteine 20%  Inhalation 4 milliLiter(s) Inhalation two times a day  doxazosin 1 milliGRAM(s) Oral at bedtime  influenza  Vaccine (HIGH DOSE) 0.7 milliLiter(s) IntraMuscular once  polyethylene glycol 3350 17 Gram(s) Oral daily  sodium chloride 0.9% for Nebulization 3 milliLiter(s) Nebulizer every 4 hours    All other PRN medications:  albuterol    90 MICROgram(s) HFA Inhaler 2 Puff(s) Inhalation every 6 hours PRN    Vital Signs Last 24 Hrs  T(C): 36.6 (2024 04:54), Max: 37 (2024 17:46)  T(F): 97.9 (2024 04:54), Max: 98.6 (2024 17:46)  HR: 78 (2024 08:35) (68 - 86)  BP: 114/61 (2024 08:35) (104/59 - 126/68)  BP(mean): 80 (2024 08:35) (76 - 88)  RR: 18 (2024 08:35) (17 - 18)  SpO2: 92% (2024 08:35) (92% - 99%)    Parameters below as of 2024 08:35  Patient On (Oxygen Delivery Method): tracheostomy collar  O2 Flow (L/min): 10  O2 Concentration (%): 35       @ 07: @ 07:00  --------------------------------------------------------  IN:    Enteral Tube Flush: 500 mL    Jevity 1.2: 1187 mL  Total IN: 1687 mL    OUT:    Oral Fluid: 0 mL    Voided (mL): 1 mL  Total OUT: 1 mL    Total NET: 1686 mL       @ 07: @ 08:58  --------------------------------------------------------  IN:    Enteral Tube Flush: 100 mL    Jevity 1.2: 475 mL  Total IN: 575 mL    OUT:  Total OUT: 0 mL    Total NET: 575 mL        PHYSICAL EXAM:  GEN: appears stated age, NAD  NEURO: alert & oriented x   HEENT: face symmetric, cuffless trach in place with trach ties, secretions suctioned  CVS: regular rate and rhythm  Pulm: normal respiratory excursions, not tachypneic, no labored breathing  Abd: non-distended  Ext: moving all four extremities, no peripheral edema noted      LABS                       10.3   7.78  )-----------( 306      ( 2024 05:30 )             32.2        134<L>  |  97  |  22  ----------------------------<  115<H>  4.3   |  30  |  0.68    Ca    9.0      2024 05:30  Phos  2.6       Mg     1.9                Coagulation Studies-     Urinalysis Basic - ( 2024 05:30 )    Color: x / Appearance: x / SG: x / pH: x  Gluc: 115 mg/dL / Ketone: x  / Bili: x / Urobili: x   Blood: x / Protein: x / Nitrite: x   Leuk Esterase: x / RBC: x / WBC x   Sq Epi: x / Non Sq Epi: x / Bacteria: x      Endocrine Panel-  Calcium: 9.0 mg/dL ( @ 05:30)        MICROBIOLOGY:  Culture Results:   Numerous Pseudomonas aeruginosa  Accompanied by normal respiratory jose manuel (01-15-24 @ 15:29)

## 2024-01-26 LAB
GLUCOSE BLDC GLUCOMTR-MCNC: 110 MG/DL — HIGH (ref 70–99)
GLUCOSE BLDC GLUCOMTR-MCNC: 121 MG/DL — HIGH (ref 70–99)
GLUCOSE BLDC GLUCOMTR-MCNC: 160 MG/DL — HIGH (ref 70–99)
GLUCOSE BLDC GLUCOMTR-MCNC: 162 MG/DL — HIGH (ref 70–99)

## 2024-01-26 PROCEDURE — 99233 SBSQ HOSP IP/OBS HIGH 50: CPT

## 2024-01-26 RX ADMIN — Medication 1: at 17:37

## 2024-01-26 RX ADMIN — POLYETHYLENE GLYCOL 3350 17 GRAM(S): 17 POWDER, FOR SOLUTION ORAL at 12:07

## 2024-01-26 RX ADMIN — SODIUM CHLORIDE 3 MILLILITER(S): 9 INJECTION INTRAMUSCULAR; INTRAVENOUS; SUBCUTANEOUS at 07:12

## 2024-01-26 RX ADMIN — Medication 1 MILLIGRAM(S): at 23:11

## 2024-01-26 RX ADMIN — SODIUM CHLORIDE 3 MILLILITER(S): 9 INJECTION INTRAMUSCULAR; INTRAVENOUS; SUBCUTANEOUS at 18:04

## 2024-01-26 RX ADMIN — SODIUM CHLORIDE 3 MILLILITER(S): 9 INJECTION INTRAMUSCULAR; INTRAVENOUS; SUBCUTANEOUS at 10:14

## 2024-01-26 RX ADMIN — ENOXAPARIN SODIUM 40 MILLIGRAM(S): 100 INJECTION SUBCUTANEOUS at 10:14

## 2024-01-26 RX ADMIN — Medication 1: at 12:07

## 2024-01-26 RX ADMIN — SODIUM CHLORIDE 3 MILLILITER(S): 9 INJECTION INTRAMUSCULAR; INTRAVENOUS; SUBCUTANEOUS at 23:40

## 2024-01-26 RX ADMIN — Medication 650 MILLIGRAM(S): at 23:12

## 2024-01-26 RX ADMIN — ONDANSETRON 4 MILLIGRAM(S): 8 TABLET, FILM COATED ORAL at 23:10

## 2024-01-26 RX ADMIN — Medication 4 MILLILITER(S): at 18:04

## 2024-01-26 RX ADMIN — SODIUM CHLORIDE 3 MILLILITER(S): 9 INJECTION INTRAMUSCULAR; INTRAVENOUS; SUBCUTANEOUS at 02:54

## 2024-01-26 RX ADMIN — Medication 4 MILLILITER(S): at 07:12

## 2024-01-26 RX ADMIN — Medication 650 MILLIGRAM(S): at 23:40

## 2024-01-26 RX ADMIN — SODIUM CHLORIDE 3 MILLILITER(S): 9 INJECTION INTRAMUSCULAR; INTRAVENOUS; SUBCUTANEOUS at 14:36

## 2024-01-26 NOTE — PROGRESS NOTE ADULT - ASSESSMENT
KUSUM GROVE is a 88M with history of known R sided laryngeal mass (known to Dr. Crisostomo, biopsy R piriform sinus 5/2023 pos. for SCC, now s/p RT, PEG-dependent) presenting with worsening shortness of breath for past month and new imaging findings on CT scan from one week ago in [facility] concerning for new laryngeal mass. FOE: large ulcerative lesion R arytenoid/FVF obscuring view of R TVF but likely immobile, L nodular arytenoid mass, L TVF mobile. s/p tracheostomy. After GOC discussion 1/19 patient and family will not pursue any surgical treatment at this time. Plan to return home with trach supplies once delivered.     Plan:  - encourage ambulation 4-5x a day   - Family and son trach teaching ongoing  - Tube feeds - bolus feeds   - Has 7.5 Portex uncuffed tracheostomy tube in place   - Use soft suction/red rubber catheter for suctioning to avoid suction trauma   - Continue TC  - Patient and family do not desire any additional surgical treatment   - Coordinate with  for delivery of trach supplies to [facility] - will need procurement of suction canister here in  prior to leaving - per  can be ordered and delivered on day of discharge

## 2024-01-26 NOTE — PROGRESS NOTE ADULT - SUBJECTIVE AND OBJECTIVE BOX
OTOLARYNGOLOGY (ENT) PROGRESS NOTE    PATIENT: KUSUM GROVE  MRN: [id number]  : 35  [id number]  DATE OF SERVICE:  24  			         ID:KUSUM GROVE is an 88M with history of known R sided laryngeal mass (known to Dr. 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 Patient lives and receives care in [facility]. He reports increased cough and mucous production associated with intermittent episodes of worsening shortness of breath for the past month. This has prompted ED visits for evaluation and discharge with steroid courses, which temporarily improved his breathing difficulty, however, patient also has glaucoma and has been advised by his ophthalmologist to avoid futures courses of steroids. He has had a hoarse, breathy voice for the past six months. Son in law reports significant weight loss over past few months. He is PEG-dependent and does not have any oral intake. Non-smoker, social alcohol use.      Subjective/ Interval:   : AFVSS on RA ON. Patient seen and evaluated this am. Pt with inspiratory stridor and increased secretions. Pt taken for a trach, DL with biopsy. Pt tolerated surgery well and was brought to SICU.  : AFVSS on TC. Patient seen and evaluated this am. Trach cuff deflated. Trach site dry. Mucous suctioned from trach.  : AFVSS on TC. Patient seen and evaluated this am doing well. Cuff remains deflated. Trach site dry. Some mucous suctioned from trach. Pt seen spitting up white-tinged mucous.  1/15: AFVSS on TC. Patient seen and evaluated this am doing well. Cuff remains deflated. Trach site dry. Some secretions suctioned from the trach site.  : patient seen this morning, trach sutured in place, continues to suction thick secretions, will start nebulized saline today   : Patient seen this morning, trach in place with thick secretions, continue IV abx,  continue nebulized saline meds, continue trach teaching with family   : Patient seen this morning,  trach in place.  continue teaching pending conversation with tumor board Monday, pain controlled, ambulating well   ; patient seen this morning, trach in place will remove sutures today, Dr. hudson ND Dr. Crisostomo will had goals of care discussion today with patient   : patient seen on morning rounds. Cuffless trach in place, secured by trach collar. Still with thick secretions requiring frequent suctioning. reports able to ambulate yesterday. Had GOC discussion and will not pursue surgical management.   : patient seen on morning rounds. Cuffless trach in place, secured by trach collar. Secretions noted with small streaks of blood overnight. Ambulating, moving bowels, voiding freely. No new issues.   : AFVSS. Patient seen and evaluated on am rounds. Cuffless trach in place and secure. Secretions noted, pt suctioned. Pt ambulating.  : patient seen this morning, no overnight events, patient was suctioned, secretions bit thinner, patient continues to ambulate   : Patient seen this morning ans suctioned moderate amount of secretions.  allevayn foam changed under trach, pending auth for insurance to deliver supplies   : Pending supplies delivery bedside, trach care performed,   : Pending supplies delivery, trach care performed, moderate amount of secretions     ALLERGIES:  penicillin (Rash)      MEDICATIONS:  Antiinfectives:     IV fluids:  dextrose 5%. 1000 milliLiter(s) IV Continuous <Continuous>  dextrose 5%. 1000 milliLiter(s) IV Continuous <Continuous>    Hematologic/Anticoagulation:  enoxaparin Injectable 40 milliGRAM(s) SubCutaneous every 24 hours    Pain medications/Neuro:  acetaminophen   Oral Liquid .. 650 milliGRAM(s) Enteral Tube every 6 hours PRN  ondansetron Injectable 4 milliGRAM(s) IV Push every 6 hours PRN    Endocrine Medications:   dextrose 50% Injectable 12.5 Gram(s) IV Push once  dextrose 50% Injectable 25 Gram(s) IV Push once  dextrose 50% Injectable 25 Gram(s) IV Push once  dextrose Oral Gel 15 Gram(s) Oral once PRN  glucagon  Injectable 1 milliGRAM(s) IntraMuscular once  insulin lispro (ADMELOG) corrective regimen sliding scale   SubCutaneous every 6 hours    All other standing medications:   acetylcysteine 20%  Inhalation 4 milliLiter(s) Inhalation two times a day  doxazosin 1 milliGRAM(s) Oral at bedtime  influenza  Vaccine (HIGH DOSE) 0.7 milliLiter(s) IntraMuscular once  polyethylene glycol 3350 17 Gram(s) Oral daily  sodium chloride 0.9% for Nebulization 3 milliLiter(s) Nebulizer every 4 hours    All other PRN medications:  albuterol    90 MICROgram(s) HFA Inhaler 2 Puff(s) Inhalation every 6 hours PRN    Vital Signs Last 24 Hrs  T(C): 36.7 (2024 04:22), Max: 37 (2024 16:00)  T(F): 98 (2024 04:22), Max: 98.6 (2024 16:00)  HR: 62 (2024 04:06) (62 - 78)  BP: 129/72 (2024 04:06) (114/61 - 129/72)  BP(mean): 92 (2024 04:06) (80 - 92)  RR: 16 (2024 04:06) (16 - 18)  SpO2: 98% (2024 04:06) (92% - 100%)    Parameters below as of 2024 04:06  Patient On (Oxygen Delivery Method): tracheostomy collar  O2 Flow (L/min): 10  O2 Concentration (%): 35      -25 @ 07:01  -  01-26 @ 07:00  --------------------------------------------------------  IN:    Enteral Tube Flush: 900 mL    IV PiggyBack: 100 mL    Jevity 1.2: 1423 mL  Total IN: 2423 mL    OUT:  Total OUT: 0 mL    Total NET: 2423 mL      PHYSICAL EXAM:  GEN: appears stated age, NAD  NEURO: alert & oriented x 4/4  HEENT: face symmetric, oropharynx moist without exudates, CN VII/XI/XII intact, trach in place with ties  CVS: regular rate and rhythm  Pulm: normal respiratory excursions, not tachypneic, no labored breathing  Abd: non-distended  Ext: moving all four extremities, no peripheral edema noted       LABS                       10.3   7.78  )-----------( 306      ( 2024 05:30 )             32.2    01-    134<L>  |  97  |  22  ----------------------------<  115<H>  4.3   |  30  |  0.68    Ca    9.0      2024 05:30  Phos  2.6       Mg     1.9                Coagulation Studies-     Urinalysis Basic - ( 2024 05:30 )    Color: x / Appearance: x / SG: x / pH: x  Gluc: 115 mg/dL / Ketone: x  / Bili: x / Urobili: x   Blood: x / Protein: x / Nitrite: x   Leuk Esterase: x / RBC: x / WBC x   Sq Epi: x / Non Sq Epi: x / Bacteria: x      MICROBIOLOGY:  Culture Results:   Numerous Pseudomonas aeruginosa  Accompanied by normal respiratory jose manuel (01-15-24 @ 15:29)

## 2024-01-27 LAB
GLUCOSE BLDC GLUCOMTR-MCNC: 101 MG/DL — HIGH (ref 70–99)
GLUCOSE BLDC GLUCOMTR-MCNC: 109 MG/DL — HIGH (ref 70–99)
GLUCOSE BLDC GLUCOMTR-MCNC: 110 MG/DL — HIGH (ref 70–99)
GLUCOSE BLDC GLUCOMTR-MCNC: 270 MG/DL — HIGH (ref 70–99)

## 2024-01-27 RX ADMIN — Medication 3: at 17:49

## 2024-01-27 RX ADMIN — SODIUM CHLORIDE 3 MILLILITER(S): 9 INJECTION INTRAMUSCULAR; INTRAVENOUS; SUBCUTANEOUS at 12:04

## 2024-01-27 RX ADMIN — SODIUM CHLORIDE 3 MILLILITER(S): 9 INJECTION INTRAMUSCULAR; INTRAVENOUS; SUBCUTANEOUS at 21:35

## 2024-01-27 RX ADMIN — ENOXAPARIN SODIUM 40 MILLIGRAM(S): 100 INJECTION SUBCUTANEOUS at 12:04

## 2024-01-27 RX ADMIN — Medication 1 MILLIGRAM(S): at 21:35

## 2024-01-27 RX ADMIN — Medication 4 MILLILITER(S): at 05:53

## 2024-01-27 RX ADMIN — Medication 4 MILLILITER(S): at 17:51

## 2024-01-27 RX ADMIN — SODIUM CHLORIDE 3 MILLILITER(S): 9 INJECTION INTRAMUSCULAR; INTRAVENOUS; SUBCUTANEOUS at 05:58

## 2024-01-27 RX ADMIN — SODIUM CHLORIDE 3 MILLILITER(S): 9 INJECTION INTRAMUSCULAR; INTRAVENOUS; SUBCUTANEOUS at 17:51

## 2024-01-27 NOTE — PROGRESS NOTE ADULT - SUBJECTIVE AND OBJECTIVE BOX
OTOLARYNGOLOGY (ENT) PROGRESS NOTE    PATIENT: KUSUM GROVE  MRN: [id number]  : 35  [id number]  DATE OF SERVICE:  24  			         ID:KUSUM GROVE is an 88M with history of known R sided laryngeal mass (known to Dr. 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 Patient lives and receives care in [facility]. He reports increased cough and mucous production associated with intermittent episodes of worsening shortness of breath for the past month. This has prompted ED visits for evaluation and discharge with steroid courses, which temporarily improved his breathing difficulty, however, patient also has glaucoma and has been advised by his ophthalmologist to avoid futures courses of steroids. He has had a hoarse, breathy voice for the past six months. Son in law reports significant weight loss over past few months. He is PEG-dependent and does not have any oral intake. Non-smoker, social alcohol use.      Subjective/ Interval:   : AFVSS on RA ON. Patient seen and evaluated this am. Pt with inspiratory stridor and increased secretions. Pt taken for a trach, DL with biopsy. Pt tolerated surgery well and was brought to SICU.  : AFVSS on TC. Patient seen and evaluated this am. Trach cuff deflated. Trach site dry. Mucous suctioned from trach.  : AFVSS on TC. Patient seen and evaluated this am doing well. Cuff remains deflated. Trach site dry. Some mucous suctioned from trach. Pt seen spitting up white-tinged mucous.  1/15: AFVSS on TC. Patient seen and evaluated this am doing well. Cuff remains deflated. Trach site dry. Some secretions suctioned from the trach site.  : patient seen this morning, trach sutured in place, continues to suction thick secretions, will start nebulized saline today   : Patient seen this morning, trach in place with thick secretions, continue IV abx,  continue nebulized saline meds, continue trach teaching with family   : Patient seen this morning,  trach in place.  continue teaching pending conversation with tumor board Monday, pain controlled, ambulating well   ; patient seen this morning, trach in place will remove sutures today, Dr. hudson ND Dr. Crisostomo will had goals of care discussion today with patient   : patient seen on morning rounds. Cuffless trach in place, secured by trach collar. Still with thick secretions requiring frequent suctioning. reports able to ambulate yesterday. Had GOC discussion and will not pursue surgical management.   : patient seen on morning rounds. Cuffless trach in place, secured by trach collar. Secretions noted with small streaks of blood overnight. Ambulating, moving bowels, voiding freely. No new issues.   : AFVSS. Patient seen and evaluated on am rounds. Cuffless trach in place and secure. Secretions noted, pt suctioned. Pt ambulating.  : patient seen this morning, no overnight events, patient was suctioned, secretions bit thinner, patient continues to ambulate   : Patient seen this morning ans suctioned moderate amount of secretions.  allevyn foam changed under trach, pending auth for insurance to deliver supplies   : Pending supplies delivery bedside, trach care performed,   : Pending supplies delivery, trach care performed, moderate amount of secretions   : pending trach supplies. trach care performed. thin secretions, able to clear. satting well on trach collar.    PHYSICAL EXAM:  GEN: appears stated age, NAD  NEURO: alert & oriented x 4/4  HEENT: face symmetric, oropharynx moist without exudates  CVS: regular rate and rhythm  Pulm: normal respiratory excursions, not tachypneic, no labored breathing  Abd: non-distended  Ext: moving all four extremities, no peripheral edema noted    LABS      Coagulation Studies-     Endocrine Panel-    MICROBIOLOGY:  Culture Results:   Numerous Pseudomonas aeruginosa  Accompanied by normal respiratory jose manuel (01-15-24 @ 15:29)

## 2024-01-27 NOTE — PROGRESS NOTE ADULT - ASSESSMENT
KUSUM GROVE is a 88M with history of known R sided laryngeal mass (known to Dr. 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 FOE: large ulcerative lesion R arytenoid/FVF obscuring view of R TVF but likely immobile, L nodular arytenoid mass, L TVF mobile. s/p tracheostomy. After GOC discussion 1/19 patient and family will not pursue any surgical treatment at this time. Plan to return home with trach supplies once delivered.     Plan:  - encourage ambulation 4-5x a day   - Family and son trach teaching ongoing  - Tube feeds - bolus feeds   - Has 7.5 Portex uncuffed tracheostomy tube in place   - Use soft suction/red rubber catheter for suctioning to avoid suction trauma   - Continue TC  - Patient and family do not desire any additional surgical treatment   - Coordinate with  for delivery of trach supplies to [facility] - will need procurement of suction canister here in  prior to leaving - per  can be ordered and delivered on day of discharge

## 2024-01-28 LAB
GLUCOSE BLDC GLUCOMTR-MCNC: 103 MG/DL — HIGH (ref 70–99)
GLUCOSE BLDC GLUCOMTR-MCNC: 119 MG/DL — HIGH (ref 70–99)
GLUCOSE BLDC GLUCOMTR-MCNC: 121 MG/DL — HIGH (ref 70–99)
GLUCOSE BLDC GLUCOMTR-MCNC: 123 MG/DL — HIGH (ref 70–99)

## 2024-01-28 RX ADMIN — SODIUM CHLORIDE 3 MILLILITER(S): 9 INJECTION INTRAMUSCULAR; INTRAVENOUS; SUBCUTANEOUS at 09:51

## 2024-01-28 RX ADMIN — SODIUM CHLORIDE 3 MILLILITER(S): 9 INJECTION INTRAMUSCULAR; INTRAVENOUS; SUBCUTANEOUS at 12:40

## 2024-01-28 RX ADMIN — SODIUM CHLORIDE 3 MILLILITER(S): 9 INJECTION INTRAMUSCULAR; INTRAVENOUS; SUBCUTANEOUS at 22:18

## 2024-01-28 RX ADMIN — ENOXAPARIN SODIUM 40 MILLIGRAM(S): 100 INJECTION SUBCUTANEOUS at 10:42

## 2024-01-28 RX ADMIN — Medication 1 MILLIGRAM(S): at 22:18

## 2024-01-28 RX ADMIN — Medication 4 MILLILITER(S): at 17:57

## 2024-01-28 RX ADMIN — SODIUM CHLORIDE 3 MILLILITER(S): 9 INJECTION INTRAMUSCULAR; INTRAVENOUS; SUBCUTANEOUS at 05:30

## 2024-01-28 RX ADMIN — SODIUM CHLORIDE 3 MILLILITER(S): 9 INJECTION INTRAMUSCULAR; INTRAVENOUS; SUBCUTANEOUS at 18:20

## 2024-01-28 RX ADMIN — SODIUM CHLORIDE 3 MILLILITER(S): 9 INJECTION INTRAMUSCULAR; INTRAVENOUS; SUBCUTANEOUS at 01:00

## 2024-01-28 RX ADMIN — Medication 4 MILLILITER(S): at 05:30

## 2024-01-28 NOTE — PROGRESS NOTE ADULT - ASSESSMENT
KUSUM GROVE is a 88M with history of known R sided laryngeal mass (known to Dr. 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 FOE: large ulcerative lesion R arytenoid/FVF obscuring view of R TVF but likely immobile, L nodular arytenoid mass, L TVF mobile. s/p tracheostomy. After GOC discussion 1/19 patient and family will not pursue any surgical treatment at this time. Plan to return home with trach supplies once delivered.     Plan:  - encourage ambulation 4-5x a day   - Family and son trach teaching ongoing  - Tube feeds - bolus feeds   - Has 7.5 Portex uncuffed tracheostomy tube in place   - Use soft suction/red rubber catheter for suctioning to avoid suction trauma   - Continue TC  - Patient and family do not desire any additional surgical treatment   - Coordinate with  for delivery of trach supplies to [facility] - will need procurement of suction canister here in  prior to leaving - per  can be ordered and delivered on day of discharge

## 2024-01-28 NOTE — PROGRESS NOTE ADULT - SUBJECTIVE AND OBJECTIVE BOX
OTOLARYNGOLOGY (ENT) PROGRESS NOTE    PATIENT: KUSUM GROVE  MRN: [id number]  : 35  [id number]  DATE OF SERVICE:  24  			         ID:KUSUM GROVE is an 88M with history of known R sided laryngeal mass (known to Dr. 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 concerning for new laryngeal mass. Patient lives and receives care in [facility]. He reports increased cough and mucous production associated with intermittent episodes of worsening shortness of breath for the past month. This has prompted ED visits for evaluation and discharge with steroid courses, which temporarily improved his breathing difficulty, however, patient also has glaucoma and has been advised by his ophthalmologist to avoid futures courses of steroids. He has had a hoarse, breathy voice for the past six months. Son in law reports significant weight loss over past few months. He is PEG-dependent and does not have any oral intake. Non-smoker, social alcohol use.      Subjective/ Interval:   : AFVSS on RA ON. Patient seen and evaluated this am. Pt with inspiratory stridor and increased secretions. Pt taken for a trach, DL with biopsy. Pt tolerated surgery well and was brought to SICU.  : AFVSS on TC. Patient seen and evaluated this am. Trach cuff deflated. Trach site dry. Mucous suctioned from trach.  : AFVSS on TC. Patient seen and evaluated this am doing well. Cuff remains deflated. Trach site dry. Some mucous suctioned from trach. Pt seen spitting up white-tinged mucous.  1/15: AFVSS on TC. Patient seen and evaluated this am doing well. Cuff remains deflated. Trach site dry. Some secretions suctioned from the trach site.  : patient seen this morning, trach sutured in place, continues to suction thick secretions, will start nebulized saline today   : Patient seen this morning, trach in place with thick secretions, continue IV abx,  continue nebulized saline meds, continue trach teaching with family   : Patient seen this morning,  trach in place.  continue teaching pending conversation with tumor board Monday, pain controlled, ambulating well   ; patient seen this morning, trach in place will remove sutures today, Dr. hudson ND Dr. Crisostomo will had goals of care discussion today with patient   : patient seen on morning rounds. Cuffless trach in place, secured by trach collar. Still with thick secretions requiring frequent suctioning. reports able to ambulate yesterday. Had GOC discussion and will not pursue surgical management.   : patient seen on morning rounds. Cuffless trach in place, secured by trach collar. Secretions noted with small streaks of blood overnight. Ambulating, moving bowels, voiding freely. No new issues.   : AFVSS. Patient seen and evaluated on am rounds. Cuffless trach in place and secure. Secretions noted, pt suctioned. Pt ambulating.  : patient seen this morning, no overnight events, patient was suctioned, secretions bit thinner, patient continues to ambulate   : Patient seen this morning ans suctioned moderate amount of secretions.  allevyn foam changed under trach, pending auth for insurance to deliver supplies   : Pending supplies delivery bedside, trach care performed,   : Pending supplies delivery, trach care performed, moderate amount of secretions   : pending trach supplies. trach care performed. thin secretions, able to clear. satting well on trach collar.  : pending trach supplies. trach care performed. thin secretions.    ALLERGIES:  penicillin (Rash)      MEDICATIONS:  Antiinfectives:     IV fluids:  dextrose 5%. 1000 milliLiter(s) IV Continuous <Continuous>  dextrose 5%. 1000 milliLiter(s) IV Continuous <Continuous>    Hematologic/Anticoagulation:  enoxaparin Injectable 40 milliGRAM(s) SubCutaneous every 24 hours    Pain medications/Neuro:  acetaminophen   Oral Liquid .. 650 milliGRAM(s) Enteral Tube every 6 hours PRN  ondansetron Injectable 4 milliGRAM(s) IV Push every 6 hours PRN    Endocrine Medications:   dextrose 50% Injectable 25 Gram(s) IV Push once  dextrose 50% Injectable 12.5 Gram(s) IV Push once  dextrose 50% Injectable 25 Gram(s) IV Push once  dextrose Oral Gel 15 Gram(s) Oral once PRN  glucagon  Injectable 1 milliGRAM(s) IntraMuscular once  insulin lispro (ADMELOG) corrective regimen sliding scale   SubCutaneous every 6 hours    All other standing medications:   acetylcysteine 20%  Inhalation 4 milliLiter(s) Inhalation two times a day  doxazosin 1 milliGRAM(s) Oral at bedtime  influenza  Vaccine (HIGH DOSE) 0.7 milliLiter(s) IntraMuscular once  polyethylene glycol 3350 17 Gram(s) Oral daily  sodium chloride 0.9% for Nebulization 3 milliLiter(s) Nebulizer every 4 hours    All other PRN medications:  albuterol    90 MICROgram(s) HFA Inhaler 2 Puff(s) Inhalation every 6 hours PRN    Vital Signs Last 24 Hrs  T(C): 37.2 (2024 04:47), Max: 37.2 (2024 04:47)  T(F): 98.9 (2024 04:47), Max: 98.9 (2024 04:47)  HR: 90 (2024 03:59) (64 - 90)  BP: 124/71 (2024 03:59) (106/58 - 124/71)  BP(mean): 90 (2024 03:59) (74 - 90)  RR: 17 (2024 03:59) (17 - 18)  SpO2: 96% (2024 03:59) (96% - 99%)    Parameters below as of 2024 03:59  Patient On (Oxygen Delivery Method): tracheostomy collar  O2 Flow (L/min): 8  O2 Concentration (%): 35      01-27 @ 07:01  -  -28 @ 07:00  --------------------------------------------------------  IN:    Enteral Tube Flush: 750 mL    Jevity 1.2: 1474 mL  Total IN: 2224 mL    OUT:    Voided (mL): 400 mL  Total OUT: 400 mL    Total NET: 1824 mL                  PHYSICAL EXAM:  GEN: appears stated age, NAD  NEURO: alert & oriented x 4/  HEENT: face symmetric, oropharynx moist without exudates, CN VII/XI/XII intact  CVS: regular rate and rhythm  Pulm: normal respiratory excursions, not tachypneic, no labored breathing  Abd: non-distended  Ext: moving all four extremities, no peripheral edema noted       MICROBIOLOGY:  Culture Results:   Numerous Pseudomonas aeruginosa  Accompanied by normal respiratory jose manuel (01-15-24 @ 15:29)

## 2024-01-29 LAB
GLUCOSE BLDC GLUCOMTR-MCNC: 103 MG/DL — HIGH (ref 70–99)
GLUCOSE BLDC GLUCOMTR-MCNC: 108 MG/DL — HIGH (ref 70–99)
GLUCOSE BLDC GLUCOMTR-MCNC: 120 MG/DL — HIGH (ref 70–99)

## 2024-01-29 PROCEDURE — 99233 SBSQ HOSP IP/OBS HIGH 50: CPT

## 2024-01-29 RX ADMIN — Medication 1 MILLIGRAM(S): at 23:04

## 2024-01-29 RX ADMIN — SODIUM CHLORIDE 3 MILLILITER(S): 9 INJECTION INTRAMUSCULAR; INTRAVENOUS; SUBCUTANEOUS at 01:50

## 2024-01-29 RX ADMIN — Medication 4 MILLILITER(S): at 05:18

## 2024-01-29 RX ADMIN — Medication 4 MILLILITER(S): at 17:08

## 2024-01-29 RX ADMIN — SODIUM CHLORIDE 3 MILLILITER(S): 9 INJECTION INTRAMUSCULAR; INTRAVENOUS; SUBCUTANEOUS at 23:04

## 2024-01-29 RX ADMIN — SODIUM CHLORIDE 3 MILLILITER(S): 9 INJECTION INTRAMUSCULAR; INTRAVENOUS; SUBCUTANEOUS at 12:02

## 2024-01-29 RX ADMIN — SODIUM CHLORIDE 3 MILLILITER(S): 9 INJECTION INTRAMUSCULAR; INTRAVENOUS; SUBCUTANEOUS at 05:18

## 2024-01-29 RX ADMIN — SODIUM CHLORIDE 3 MILLILITER(S): 9 INJECTION INTRAMUSCULAR; INTRAVENOUS; SUBCUTANEOUS at 17:08

## 2024-01-29 RX ADMIN — ENOXAPARIN SODIUM 40 MILLIGRAM(S): 100 INJECTION SUBCUTANEOUS at 09:20

## 2024-01-29 RX ADMIN — SODIUM CHLORIDE 3 MILLILITER(S): 9 INJECTION INTRAMUSCULAR; INTRAVENOUS; SUBCUTANEOUS at 09:27

## 2024-01-29 NOTE — PROGRESS NOTE ADULT - SUBJECTIVE AND OBJECTIVE BOX
OTOLARYNGOLOGY (ENT) PROGRESS NOTE    PATIENT: KUSUM GROVE  MRN: [id number]  : 35  [id number]  DATE OF SERVICE:  24  			         ID:KUSUM GROVE is an 88M with history of known R sided laryngeal mass (known to Dr. 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 concerning for new laryngeal mass. Patient lives and receives care in [facility]. He reports increased cough and mucous production associated with intermittent episodes of worsening shortness of breath for the past month. This has prompted ED visits for evaluation and discharge with steroid courses, which temporarily improved his breathing difficulty, however, patient also has glaucoma and has been advised by his ophthalmologist to avoid futures courses of steroids. He has had a hoarse, breathy voice for the past six months. Son in law reports significant weight loss over past few months. He is PEG-dependent and does not have any oral intake. Non-smoker, social alcohol use.      Subjective/ Interval:   : AFVSS on RA ON. Patient seen and evaluated this am. Pt with inspiratory stridor and increased secretions. Pt taken for a trach, DL with biopsy. Pt tolerated surgery well and was brought to SICU.  : AFVSS on TC. Patient seen and evaluated this am. Trach cuff deflated. Trach site dry. Mucous suctioned from trach.  : AFVSS on TC. Patient seen and evaluated this am doing well. Cuff remains deflated. Trach site dry. Some mucous suctioned from trach. Pt seen spitting up white-tinged mucous.  1/15: AFVSS on TC. Patient seen and evaluated this am doing well. Cuff remains deflated. Trach site dry. Some secretions suctioned from the trach site.  : patient seen this morning, trach sutured in place, continues to suction thick secretions, will start nebulized saline today   : Patient seen this morning, trach in place with thick secretions, continue IV abx,  continue nebulized saline meds, continue trach teaching with family   : Patient seen this morning,  trach in place.  continue teaching pending conversation with tumor board Monday, pain controlled, ambulating well   ; patient seen this morning, trach in place will remove sutures today, Dr. hudson ND Dr. Crisostomo will had goals of care discussion today with patient   : patient seen on morning rounds. Cuffless trach in place, secured by trach collar. Still with thick secretions requiring frequent suctioning. reports able to ambulate yesterday. Had GOC discussion and will not pursue surgical management.   : patient seen on morning rounds. Cuffless trach in place, secured by trach collar. Secretions noted with small streaks of blood overnight. Ambulating, moving bowels, voiding freely. No new issues.   : AFVSS. Patient seen and evaluated on am rounds. Cuffless trach in place and secure. Secretions noted, pt suctioned. Pt ambulating.  : patient seen this morning, no overnight events, patient was suctioned, secretions bit thinner, patient continues to ambulate   : Patient seen this morning ans suctioned moderate amount of secretions.  allevyn foam changed under trach, pending auth for insurance to deliver supplies   : Pending supplies delivery bedside, trach care performed,   : Pending supplies delivery, trach care performed, moderate amount of secretions   : pending trach supplies. trach care performed. thin secretions, able to clear. satting well on trach collar.  : pending trach supplies. trach care performed. thin secretions.  : pending trach supplies, trach care performed. Pt doing well.    ALLERGIES:  penicillin (Rash)      MEDICATIONS:  Antiinfectives:     IV fluids:  dextrose 5%. 1000 milliLiter(s) IV Continuous <Continuous>  dextrose 5%. 1000 milliLiter(s) IV Continuous <Continuous>    Hematologic/Anticoagulation:  enoxaparin Injectable 40 milliGRAM(s) SubCutaneous every 24 hours    Pain medications/Neuro:  acetaminophen   Oral Liquid .. 650 milliGRAM(s) Enteral Tube every 6 hours PRN  ondansetron Injectable 4 milliGRAM(s) IV Push every 6 hours PRN    Endocrine Medications:   dextrose 50% Injectable 12.5 Gram(s) IV Push once  dextrose 50% Injectable 25 Gram(s) IV Push once  dextrose 50% Injectable 25 Gram(s) IV Push once  dextrose Oral Gel 15 Gram(s) Oral once PRN  glucagon  Injectable 1 milliGRAM(s) IntraMuscular once  insulin lispro (ADMELOG) corrective regimen sliding scale   SubCutaneous every 6 hours    All other standing medications:   acetylcysteine 20%  Inhalation 4 milliLiter(s) Inhalation two times a day  doxazosin 1 milliGRAM(s) Oral at bedtime  influenza  Vaccine (HIGH DOSE) 0.7 milliLiter(s) IntraMuscular once  polyethylene glycol 3350 17 Gram(s) Oral daily  sodium chloride 0.9% for Nebulization 3 milliLiter(s) Nebulizer every 4 hours    All other PRN medications:  albuterol    90 MICROgram(s) HFA Inhaler 2 Puff(s) Inhalation every 6 hours PRN    Vital Signs Last 24 Hrs  T(C): 36.8 (2024 04:40), Max: 37.1 (2024 17:48)  T(F): 98.2 (2024 04:40), Max: 98.7 (2024 17:48)  HR: 76 (2024 04:07) (70 - 84)  BP: 117/65 (2024 04:07) (117/65 - 141/66)  BP(mean): 86 (2024 04:07) (83 - 95)  RR: 17 (2024 04:07) (16 - 18)  SpO2: 97% (2024 04:07) (97% - 100%)    Parameters below as of 2024 04:07  Patient On (Oxygen Delivery Method): tracheostomy collar  O2 Flow (L/min): 8  O2 Concentration (%): 35       @ 07: @ 07:00  --------------------------------------------------------  IN:    Enteral Tube Flush: 750 mL    Jevity 1.2: 1474 mL  Total IN: 2224 mL    OUT:    Voided (mL): 400 mL  Total OUT: 400 mL    Total NET: 1824 mL       @ 07: @ 06:44  --------------------------------------------------------  IN:    Enteral Tube Flush: 700 mL    Jevity 1.2: 948 mL  Total IN: 1648 mL    OUT:  Total OUT: 0 mL    Total NET: 1648 mL      PHYSICAL EXAM:  GEN: appears stated age, NAD  NEURO: alert & oriented x   HEENT: face symmetric, oropharynx moist without exudates, CN VII/XI/XII intact, trach in place  CVS: regular rate and rhythm  Pulm: normal respiratory excursions, not tachypneic, no labored breathing  Abd: non-distended  Ext: moving all four extremities, no peripheral edema noted       MICROBIOLOGY:  Culture Results:   Numerous Pseudomonas aeruginosa  Accompanied by normal respiratory jose manuel (01-15-24 @ 15:29)

## 2024-01-29 NOTE — PROGRESS NOTE ADULT - ASSESSMENT
KUSUM GROVE is a 88M with history of known R sided laryngeal mass (known to Dr. Crisostomo, biopsy R piriform sinus 5/2023 pos. for SCC, now s/p RT, PEG-dependent) presenting with worsening shortness of breath for past month and new imaging findings on CT scan from one week ago in [facility] concerning for new laryngeal mass. FOE: large ulcerative lesion R arytenoid/FVF obscuring view of R TVF but likely immobile, L nodular arytenoid mass, L TVF mobile. s/p tracheostomy. After GOC discussion 1/19 patient and family will not pursue any surgical treatment at this time. Plan to return home with trach supplies once delivered.     Plan:  - encourage ambulation 4-5x a day   - Family and son trach teaching ongoing  - Tube feeds - bolus feeds   - Has 7.5 Portex uncuffed tracheostomy tube in place   - Use soft suction/red rubber catheter for suctioning to avoid suction trauma   - Continue TC  - Patient and family do not desire any additional surgical treatment   - Coordinate with  for delivery of trach supplies to [facility] - will need procurement of suction canister here in  prior to leaving - per  can be ordered and delivered on day of discharge

## 2024-01-30 LAB
GLUCOSE BLDC GLUCOMTR-MCNC: 107 MG/DL — HIGH (ref 70–99)
GLUCOSE BLDC GLUCOMTR-MCNC: 111 MG/DL — HIGH (ref 70–99)
GLUCOSE BLDC GLUCOMTR-MCNC: 116 MG/DL — HIGH (ref 70–99)
GLUCOSE BLDC GLUCOMTR-MCNC: 118 MG/DL — HIGH (ref 70–99)
GLUCOSE BLDC GLUCOMTR-MCNC: 143 MG/DL — HIGH (ref 70–99)

## 2024-01-30 PROCEDURE — 99233 SBSQ HOSP IP/OBS HIGH 50: CPT

## 2024-01-30 RX ADMIN — Medication 4 MILLILITER(S): at 17:43

## 2024-01-30 RX ADMIN — Medication 1 MILLIGRAM(S): at 22:53

## 2024-01-30 RX ADMIN — SODIUM CHLORIDE 3 MILLILITER(S): 9 INJECTION INTRAMUSCULAR; INTRAVENOUS; SUBCUTANEOUS at 02:00

## 2024-01-30 RX ADMIN — ENOXAPARIN SODIUM 40 MILLIGRAM(S): 100 INJECTION SUBCUTANEOUS at 09:57

## 2024-01-30 RX ADMIN — SODIUM CHLORIDE 3 MILLILITER(S): 9 INJECTION INTRAMUSCULAR; INTRAVENOUS; SUBCUTANEOUS at 06:46

## 2024-01-30 RX ADMIN — Medication 4 MILLILITER(S): at 06:47

## 2024-01-30 RX ADMIN — POLYETHYLENE GLYCOL 3350 17 GRAM(S): 17 POWDER, FOR SOLUTION ORAL at 11:38

## 2024-01-30 RX ADMIN — SODIUM CHLORIDE 3 MILLILITER(S): 9 INJECTION INTRAMUSCULAR; INTRAVENOUS; SUBCUTANEOUS at 17:43

## 2024-01-30 RX ADMIN — SODIUM CHLORIDE 3 MILLILITER(S): 9 INJECTION INTRAMUSCULAR; INTRAVENOUS; SUBCUTANEOUS at 09:58

## 2024-01-30 RX ADMIN — SODIUM CHLORIDE 3 MILLILITER(S): 9 INJECTION INTRAMUSCULAR; INTRAVENOUS; SUBCUTANEOUS at 23:55

## 2024-01-30 RX ADMIN — SODIUM CHLORIDE 3 MILLILITER(S): 9 INJECTION INTRAMUSCULAR; INTRAVENOUS; SUBCUTANEOUS at 02:05

## 2024-01-30 RX ADMIN — SODIUM CHLORIDE 3 MILLILITER(S): 9 INJECTION INTRAMUSCULAR; INTRAVENOUS; SUBCUTANEOUS at 14:04

## 2024-01-30 NOTE — PROGRESS NOTE ADULT - ATTENDING COMMENTS
I agree with the history exam and new assessment and plan as outlined above.  I personally evaluated the patient.
I agree with the history exam and new assessment and plan as outlined above.  I personally evaluated the patient.
I agree with the history, exam as well as assessment and plan as outlined today.  I personally evaluated the patient.
Agree with the history and evaluation as outlined above.  I agree with the assessment and plan as outlined as well.  I personally evaluated the patient and had a lengthy discussion with the patient and family members.
Agree with the history and exam as above.  I agree with the assessment and plan.  I personally evaluated the patient.
I agree with the history, exam as well as assessment and plan as outlined today.  I personally evaluated the patient.
I agree with the history, exam as well as assessment and plan as outlined today.  I personally evaluated the patient.

## 2024-01-30 NOTE — PROGRESS NOTE ADULT - SUBJECTIVE AND OBJECTIVE BOX
OTOLARYNGOLOGY (ENT) PROGRESS NOTE    PATIENT: KUSUM GROVE  MRN: [id number]  : 35  [id number]  DATE OF SERVICE:  24  			           ID:KUSUM GROVE is an 88M with history of known R sided laryngeal mass (known to Dr. 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 Patient lives and receives care in [facility]. He reports increased cough and mucous production associated with intermittent episodes of worsening shortness of breath for the past month. This has prompted ED visits for evaluation and discharge with steroid courses, which temporarily improved his breathing difficulty, however, patient also has glaucoma and has been advised by his ophthalmologist to avoid futures courses of steroids. He has had a hoarse, breathy voice for the past six months. Son in law reports significant weight loss over past few months. He is PEG-dependent and does not have any oral intake. Non-smoker, social alcohol use.      Subjective/ Interval:   : AFVSS on RA ON. Patient seen and evaluated this am. Pt with inspiratory stridor and increased secretions. Pt taken for a trach, DL with biopsy. Pt tolerated surgery well and was brought to SICU.  : AFVSS on TC. Patient seen and evaluated this am. Trach cuff deflated. Trach site dry. Mucous suctioned from trach.  : AFVSS on TC. Patient seen and evaluated this am doing well. Cuff remains deflated. Trach site dry. Some mucous suctioned from trach. Pt seen spitting up white-tinged mucous.  1/15: AFVSS on TC. Patient seen and evaluated this am doing well. Cuff remains deflated. Trach site dry. Some secretions suctioned from the trach site.  : patient seen this morning, trach sutured in place, continues to suction thick secretions, will start nebulized saline today   : Patient seen this morning, trach in place with thick secretions, continue IV abx,  continue nebulized saline meds, continue trach teaching with family   : Patient seen this morning,  trach in place.  continue teaching pending conversation with tumor board Monday, pain controlled, ambulating well   ; patient seen this morning, trach in place will remove sutures today, Dr. hudson ND Dr. Crisostomo will had goals of care discussion today with patient   : patient seen on morning rounds. Cuffless trach in place, secured by trach collar. Still with thick secretions requiring frequent suctioning. reports able to ambulate yesterday. Had GOC discussion and will not pursue surgical management.   : patient seen on morning rounds. Cuffless trach in place, secured by trach collar. Secretions noted with small streaks of blood overnight. Ambulating, moving bowels, voiding freely. No new issues.   : AFVSS. Patient seen and evaluated on am rounds. Cuffless trach in place and secure. Secretions noted, pt suctioned. Pt ambulating.  : patient seen this morning, no overnight events, patient was suctioned, secretions bit thinner, patient continues to ambulate   : Patient seen this morning ans suctioned moderate amount of secretions.  allevyn foam changed under trach, pending auth for insurance to deliver supplies   : Pending supplies delivery bedside, trach care performed,   : Pending supplies delivery, trach care performed, moderate amount of secretions   : pending trach supplies. trach care performed. thin secretions, able to clear. satting well on trach collar.  : pending trach supplies. trach care performed. thin secretions.  : pending trach supplies, trach care performed. Pt doing well.  : Patient seen bedside trach care performed, doing well, pending supplies     ALLERGIES:  penicillin (Rash)      MEDICATIONS:  Antiinfectives:     IV fluids:  dextrose 5%. 1000 milliLiter(s) IV Continuous <Continuous>  dextrose 5%. 1000 milliLiter(s) IV Continuous <Continuous>    Hematologic/Anticoagulation:  enoxaparin Injectable 40 milliGRAM(s) SubCutaneous every 24 hours    Pain medications/Neuro:  acetaminophen   Oral Liquid .. 650 milliGRAM(s) Enteral Tube every 6 hours PRN  ondansetron Injectable 4 milliGRAM(s) IV Push every 6 hours PRN    Endocrine Medications:   dextrose 50% Injectable 25 Gram(s) IV Push once  dextrose 50% Injectable 12.5 Gram(s) IV Push once  dextrose 50% Injectable 25 Gram(s) IV Push once  dextrose Oral Gel 15 Gram(s) Oral once PRN  glucagon  Injectable 1 milliGRAM(s) IntraMuscular once  insulin lispro (ADMELOG) corrective regimen sliding scale   SubCutaneous every 6 hours    All other standing medications:   acetylcysteine 20%  Inhalation 4 milliLiter(s) Inhalation two times a day  doxazosin 1 milliGRAM(s) Oral at bedtime  influenza  Vaccine (HIGH DOSE) 0.7 milliLiter(s) IntraMuscular once  polyethylene glycol 3350 17 Gram(s) Oral daily  sodium chloride 0.9% for Nebulization 3 milliLiter(s) Nebulizer every 4 hours    All other PRN medications:  albuterol    90 MICROgram(s) HFA Inhaler 2 Puff(s) Inhalation every 6 hours PRN    Vital Signs Last 24 Hrs  T(C): 36.7 (2024 04:52), Max: 37.1 (2024 10:00)  T(F): 98 (2024 04:52), Max: 98.8 (2024 17:57)  HR: 80 (2024 04:00) (68 - 86)  BP: 137/71 (2024 04:00) (117/60 - 144/65)  BP(mean): 96 (2024 04:00) (82 - 96)  RR: 18 (2024 04:00) (17 - 18)  SpO2: 99% (2024 04:00) (91% - 100%)    Parameters below as of 2024 04:00  Patient On (Oxygen Delivery Method): tracheostomy collar  O2 Flow (L/min): 8  O2 Concentration (%): 35      -29 @ 07:01  -  -30 @ 07:00  --------------------------------------------------------  IN:    Enteral Tube Flush: 400 mL    Jevity 1.2: 1174 mL  Total IN: 1574 mL    OUT:    Voided (mL): 150 mL  Total OUT: 150 mL    Total NET: 1424 mL        PHYSICAL EXAM:  GEN: appears stated age, NAD  NEURO: alert & oriented x 4/4  HEENT: face symmetric, oropharynx moist without exudates, CN VII/XI/XII intact, trach in place  CVS: regular rate and rhythm  Pulm: normal respiratory excursions, not tachypneic, no labored breathing  Abd: non-distended  Ext: moving all four extremities, no peripheral edema noted

## 2024-01-30 NOTE — PROGRESS NOTE ADULT - ASSESSMENT
Assessment and Plan:  KUSUM GROVE is a 88M with history of known R sided laryngeal mass (known to Dr. 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 FOE: large ulcerative lesion R arytenoid/FVF obscuring view of R TVF but likely immobile, L nodular arytenoid mass, L TVF mobile. s/p tracheostomy. After GOC discussion 1/19 patient and family will not pursue any surgical treatment at this time. Plan to return home with trach supplies once delivered.     Plan:  - encourage ambulation 4-5x a day   - Family and son trach teaching ongoing  - Tube feeds - bolus feeds   - Has 7.5 Portex uncuffed tracheostomy tube in place   - Use soft suction/red rubber catheter for suctioning to avoid suction trauma   - Continue TC  - Patient and family do not desire any additional surgical treatment   - Coordinate with  for delivery of trach supplies to [facility] - will need procurement of suction canister here in  prior to leaving - per  can be ordered and delivered on day of discharge    [facility] at [phone] with any questions/concerns.    Nica Salinas PA-C  01-30-24 @ 07:44

## 2024-01-31 ENCOUNTER — TRANSCRIPTION ENCOUNTER (OUTPATIENT)
Age: 89
End: 2024-01-31

## 2024-01-31 LAB
GLUCOSE BLDC GLUCOMTR-MCNC: 105 MG/DL — HIGH (ref 70–99)
GLUCOSE BLDC GLUCOMTR-MCNC: 136 MG/DL — HIGH (ref 70–99)
GLUCOSE BLDC GLUCOMTR-MCNC: 270 MG/DL — HIGH (ref 70–99)

## 2024-01-31 PROCEDURE — 99233 SBSQ HOSP IP/OBS HIGH 50: CPT

## 2024-01-31 RX ADMIN — SODIUM CHLORIDE 3 MILLILITER(S): 9 INJECTION INTRAMUSCULAR; INTRAVENOUS; SUBCUTANEOUS at 01:54

## 2024-01-31 RX ADMIN — ENOXAPARIN SODIUM 40 MILLIGRAM(S): 100 INJECTION SUBCUTANEOUS at 09:59

## 2024-01-31 RX ADMIN — Medication 1 MILLIGRAM(S): at 23:08

## 2024-01-31 RX ADMIN — SODIUM CHLORIDE 3 MILLILITER(S): 9 INJECTION INTRAMUSCULAR; INTRAVENOUS; SUBCUTANEOUS at 09:58

## 2024-01-31 RX ADMIN — SODIUM CHLORIDE 3 MILLILITER(S): 9 INJECTION INTRAMUSCULAR; INTRAVENOUS; SUBCUTANEOUS at 06:54

## 2024-01-31 RX ADMIN — SODIUM CHLORIDE 3 MILLILITER(S): 9 INJECTION INTRAMUSCULAR; INTRAVENOUS; SUBCUTANEOUS at 17:23

## 2024-01-31 RX ADMIN — Medication 4 MILLILITER(S): at 06:53

## 2024-01-31 RX ADMIN — Medication 4 MILLILITER(S): at 17:24

## 2024-01-31 RX ADMIN — SODIUM CHLORIDE 3 MILLILITER(S): 9 INJECTION INTRAMUSCULAR; INTRAVENOUS; SUBCUTANEOUS at 23:08

## 2024-01-31 RX ADMIN — Medication 3: at 17:24

## 2024-01-31 RX ADMIN — SODIUM CHLORIDE 3 MILLILITER(S): 9 INJECTION INTRAMUSCULAR; INTRAVENOUS; SUBCUTANEOUS at 15:31

## 2024-01-31 NOTE — PROGRESS NOTE ADULT - PROVIDER SPECIALTY LIST ADULT
ENT
Infectious Disease
SICU
ENT

## 2024-01-31 NOTE — CHART NOTE - NSCHARTNOTEFT_GEN_A_CORE
Admitting Diagnosis:   Patient is a 88y old  Male who presents with a chief complaint of LARYNGEAL NODULE        PAST MEDICAL & SURGICAL HISTORY:  [place] (hard of hearing)  HTN (hypertension)  Glaucoma  Deep vein thrombosis (DVT) left knee, 25 yrs ago  Dysphagia  S/P total knee replacement, left      CURRENT NUTRITION ORDER:   - Bolus 5 cans of Jevity 1.5 via PE cans (474 mL) @ 0800 and 1400 and 1 can (237 mL) @ 2000   - Flush with 50 mL water before and after each bolus (provides 300 mL)   - The above provides 1775 kcal, 76 g protein, 1200 mL free fluid   - Free water bolus 100 mL @ 1000, 1200, 1600, 1800 (provides 400 mL) or at team’s discretion   - NKFA     PO INTAKE:  % [  ]       50-75% [  ]       25-50% [  ]       <25% [  ]       N/A [ xx ]     GI: No GI issues noted, last BM ; PEG placement LUQ    PAIN:  Denies pain or discomfort     SKIN: no edema documented; pressure injury sacral spine stage II - stable; pressure injury Rt buttocks      LABS:      138  |  100  |  24<H>  ----------------------------<  128<H>  4.3   |  32<H>  |  0.68    Ca    8.7      2024 05:30  Phos  2.5       Mg     1.9           CAPILLARY BLOOD GLUCOSE    POCT Blood Glucose.: 126 mg/dL (2024 11:32)  POCT Blood Glucose.: 111 mg/dL (2024 06:29)  POCT Blood Glucose.: 147 mg/dL (2024 23:28)  POCT Blood Glucose.: 127 mg/dL (2024 17:03)    MEDICATIONS  (STANDING):  acetylcysteine 20%  Inhalation 4 milliLiter(s) Inhalation two times a day  cefepime   IVPB 2000 milliGRAM(s) IV Intermittent every 8 hours  chlorhexidine 4% Liquid 1 Application(s) Topical <User Schedule>  dextrose 5%. 1000 milliLiter(s) (50 mL/Hr) IV Continuous <Continuous>  dextrose 5%. 1000 milliLiter(s) (100 mL/Hr) IV Continuous <Continuous>  dextrose 50% Injectable 25 Gram(s) IV Push once  dextrose 50% Injectable 12.5 Gram(s) IV Push once  dextrose 50% Injectable 25 Gram(s) IV Push once  doxazosin 1 milliGRAM(s) Oral at bedtime  enoxaparin Injectable 40 milliGRAM(s) SubCutaneous every 24 hours  glucagon  Injectable 1 milliGRAM(s) IntraMuscular once  influenza  Vaccine (HIGH DOSE) 0.7 milliLiter(s) IntraMuscular once  insulin lispro (ADMELOG) corrective regimen sliding scale   SubCutaneous every 6 hours  polyethylene glycol 3350 17 Gram(s) Oral daily  sodium chloride 0.9% for Nebulization 3 milliLiter(s) Nebulizer every 4 hours    MEDICATIONS  (PRN):  acetaminophen   Oral Liquid .. 650 milliGRAM(s) Enteral Tube every 6 hours PRN Mild Pain (1 - 3)  albuterol    90 MICROgram(s) HFA Inhaler 2 Puff(s) Inhalation every 6 hours PRN Shortness of Breath  dextrose Oral Gel 15 Gram(s) Oral once PRN Blood Glucose LESS THAN 70 milliGRAM(s)/deciliter  ondansetron Injectable 4 milliGRAM(s) IV Push every 6 hours PRN Nausea        ANTHROPOMETRICS:   Height (inches):  69   Weight (pounds):  114.0 ()   BMI (kg/m^2):  16.8   IBW (pounds):  160 +/- 10%   %IBW:  71.3 %     WEIGHT CHANGE: no new weights     ESTIMATED NUTRIENT NEEDS:   Calories:  (30-35 kcal/kg) 4762-4081 kcal   Protein:  (1.4-1.7 g/kg) 72-88 g   Fluids:  1 mL/kcal or at team’s discretion   Ideal body weight (IBW) used to calculate energy needs due to pt's current body weight <80% ideal body weight per [facility] Standards of Nutrition Care. Needs adjusted for age and current clinical status.       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 FOE: large ulcerative lesion R arytenoid/FVF obscuring view of R TVF but likely immobile, L nodular arytenoid mass, L TVF mobile. s/p tracheostomy. After GOC discussion  patient and family will not pursue any surgical treatment at this time. Plan to return home with trach supplies once delivered.     Pt continues on enteral feeds, unchanged since last assessment. Feeds continue to meet nutrition needs. Continues to tolerate well, ongoing plans for discharge home. RD to follow.      PREVIOUS NUTRITION DIAGNOSIS: Severe protein calorie malnutrition in context of chronic illness related to inadequate intake as evidenced by <75% of nutrition needs >1 month, severe muscle/fat wasting, 19% weight loss x6 months    Active [ xx ]       Resolved [  ]     GOAL: Patient to meet at least 75% of nutritional needs consistently via most feasible route    RECOMMENDATIONS:   - Recommend continue current tube feed order as patient tolerating well and meeting all macro- and micronutrient needs    >> Bolus 5 cans of Jevity 1.5 via PE cans (474 mL) @ 0800 and 1400 and 1 can (237 mL) @ 2000     >> Flush with 50 mL water before and after each bolus (provides 300 mL)     >> The above provides 1775 kcal, 76 g protein, 1200 mL free fluid (34.3 kcal/kg and 1.5 g/kg protein based on IBW 51.8 kg)    >> Free water bolus 100 mL @ 1000, 1200, 1600, 1800 (provides 400 mL) or at team’s discretion     >> Monitor blood glucoses, adjust prn  - Monitor tube feed tolerance, GI distress, labs, weights   - Monitor electrolytes, adjust and replete PRN   - Pain and bowel regimen as per team   - The above recommendations were communicated with the team    EDUCATION:  Pt educated on medical nutrition therapy specific to the patient’s diagnosis/condition; he expressed understanding; all questions and concerns addressed to his satisfaction     RISK LEVEL:  High [ xx ]       Moderate [  ]       Low [  ]
Admitting Diagnosis:   Patient is a 88y old  Male who presents with a chief complaint of LARYNGEAL NODULE      PAST MEDICAL & SURGICAL HISTORY:  [place] (hard of hearing)  HTN (hypertension)  Glaucoma  Deep vein thrombosis (DVT) left knee, 25 yrs ago  Dysphagia  S/P total knee replacement, left      CURRENT NUTRITION ORDER:   - NPO with Tube Feeds via PEG   - 1440 mL total volume of Jevity 1.2 via bolus (1732 kcal, 80 g protein, 1161 mL free fluid)    >> 8am 480 mL, 12pm 240 mL, 4pm 240 mL, 8pm 480 mL   - Flush 50 mL before/after each feed (200 mL)  - Flush 50 mL q6hr (200 mL)  - The above provides 1732 kcal, 80 g protein, 1561 mL free fluid  - NKFA     PO INTAKE:  % [  ]       50-75% [  ]       25-50% [  ]       <25% [  ]       N/A [ xx ]     GI: Denies any nausea/vomiting; denies any GI pain; last bowel movement documented      PAIN:  Denies pain or discomfort     SKIN: no edema documented; pressure injury sacral spine stage II - stable; pressure injury Rt buttocks - no staging yet      LABS:      135  |  96  |  20  ----------------------------<  102<H>  5.0   |  31  |  0.73    Ca    9.1      2024 12:20  Phos  3.2       Mg     2.0           CAPILLARY BLOOD GLUCOSE  POCT Blood Glucose.: 101 mg/dL (2024 11:12)  POCT Blood Glucose.: 126 mg/dL (2024 06:24)  POCT Blood Glucose.: 112 mg/dL (2024 00:17)  POCT Blood Glucose.: 90 mg/dL (15 Ecferino 2024 17:32)      MEDICATIONS  (STANDING):  acetylcysteine 20%  Inhalation 4 milliLiter(s) Inhalation two times a day  cefepime   IVPB      cefepime   IVPB 2000 milliGRAM(s) IV Intermittent once  cefepime   IVPB 2000 milliGRAM(s) IV Intermittent every 8 hours  chlorhexidine 4% Liquid 1 Application(s) Topical <User Schedule>  dextrose 5%. 1000 milliLiter(s) (50 mL/Hr) IV Continuous <Continuous>  dextrose 5%. 1000 milliLiter(s) (100 mL/Hr) IV Continuous <Continuous>  dextrose 50% Injectable 12.5 Gram(s) IV Push once  dextrose 50% Injectable 25 Gram(s) IV Push once  dextrose 50% Injectable 25 Gram(s) IV Push once  doxazosin 1 milliGRAM(s) Oral at bedtime  enoxaparin Injectable 40 milliGRAM(s) SubCutaneous every 24 hours  glucagon  Injectable 1 milliGRAM(s) IntraMuscular once  influenza  Vaccine (HIGH DOSE) 0.7 milliLiter(s) IntraMuscular once  insulin lispro (ADMELOG) corrective regimen sliding scale   SubCutaneous every 6 hours  polyethylene glycol 3350 17 Gram(s) Oral every 24 hours  sodium chloride 0.9% for Nebulization 3 milliLiter(s) Nebulizer every 4 hours      MEDICATIONS  (PRN):  acetaminophen   Oral Liquid .. 650 milliGRAM(s) Enteral Tube every 6 hours PRN Mild Pain (1 - 3)  albuterol    90 MICROgram(s) HFA Inhaler 2 Puff(s) Inhalation every 6 hours PRN Shortness of Breath  dextrose Oral Gel 15 Gram(s) Oral once PRN Blood Glucose LESS THAN 70 milliGRAM(s)/deciliter  ondansetron Injectable 4 milliGRAM(s) IV Push every 6 hours PRN Nausea  oxyCODONE    IR 2.5 milliGRAM(s) Oral every 6 hours PRN Moderate Pain (4 - 6)  oxyCODONE    IR 5 milliGRAM(s) Oral every 6 hours PRN Severe Pain (7 - 10)      ANTHROPOMETRICS:   Height (inches):  69   Weight (pounds):  114.0 ()   BMI (kg/m^2):  16.8   IBW (pounds):  160 +/- 10%   %IBW:  71.3 %     WEIGHT CHANGE: no new weights     ESTIMATED NUTRIENT NEEDS:   Calories:  (30-35 kcal/kg) 3558-7516 kcal   Protein:  (1.4-1.7 g/kg) 72-88 g   Fluids:  1 mL/kcal or at team’s discretion   Ideal body weight (IBW) used to calculate energy needs due to pt's current body weight <80% ideal body weight per [facility] Standards of Nutrition Care. Needs adjusted for age and current clinical status.       SUBJECTIVE:   88M w PMH of R sided laryngeal mass (known to Dr. 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 CT scan in [facility] concerning for recurrence of cancer, FOE with large ulcerative lesion R arytenoid/FVF obscuring view of R TVF but likely immobile, L nodular arytenoid mass, L TVF mobile. Now s/p direct laryngoscopy w/ biopsy and trach placement on . Transferred to SICU postop.     Patient discussed with nursing and team. Chart, meds, and labs reviewed. Tmax 37.1 C. MAPs 82-97 mm Hg. Meds significant for insulin sliding scale (lispro), polyethylene glycol (MiraLAX), ondansetron (Zofran). Labs significant for POCT ; glucose 102H. Patient with trach  and s/p PEG placement May 2023. Met with patient on  Lachman. Nutrition interview conducted with daughter Tatyana and patient. NKFA confirmed. Significant weight loss from UBW of 140 pounds confirmed. Patient had stopped using the PEG in August but continued to flush it. In December his dysphagia worsened, and he began using the PEG again. He does not have a pump at home but instead uses gravity bolus feeds. Patient and daughter with preference to change to a more concentrated formula to reduce the amount of tube feed necessary to meet nutrition needs.     PREVIOUS NUTRITION DIAGNOSIS: Severe protein calorie malnutrition in context of chronic illness related to inadequate intake as evidenced by <75% of nutrition needs >1 month, severe muscle/fat wasting, 19% weight loss x6 months    Active [ xx ]       Resolved [  ]     GOAL: Patient to meet at least 75% of nutritional needs consistently via most feasible route      RECOMMENDATIONS:   - Recommend change tube feed order per patient/daughter preference; see below     >> Bolus 5 cans of Jevity 1.5 via PE cans (474 mL) @ 0800 and 1400 and 1 can (237 mL) @ 2000     >> Flush with 50 mL water before and after each bolus (provides 300 mL)     >> The above provides 1775 kcal, 76 g protein, 1200 mL free fluid and meets all macro- and micronutrient needs (34.3 kcal/kg and 1.5 g/kg protein based on IBW 51.8 kg)    >> Free water bolus 100 mL @ 1000, 1200, 1600, 1800 (provides 400 mL) or at team’s discretion     >> Hold feeds if increase in pressor requirements, MAPs consistently trending <60 mmHg, lactic acidosis presents, or GI intolerance is noted   - Monitor tube feed tolerance, GI distress, labs, weights   - Monitor electrolytes, adjust and replete PRN   - Pain and bowel regimen as per team   - The above recommendations were communicated with the team     EDUCATION: patient and daughter educated on medical nutrition therapy specific to the patient’s diagnosis/condition; they expressed understanding; all questions and concerns addressed to their satisfaction       RISK LEVEL:  High [ xx ]       Moderate [  ]       Low [  ]     RAMAN Vásquez, MS, RD, CDN [id number]
Patient can adjust the times of his feeding schedule as needed for breakfast, lunch and dinner. Patient can travel with out portable humidification.  If needed the nurse traveling with him can give him a 3cc saline bullet via tracheostomy tube followed by immediate suctioning or they can plug in the humidification machine to an outlet.    Nica DACOSTA
Admitting Diagnosis:   Patient is a 88y old  Male who presents with a chief complaint of LARYNGEAL NODULE  (2024 11:47)      PAST MEDICAL & SURGICAL HISTORY:  [place] (hard of hearing)  HTN (hypertension)  Glaucoma  Deep vein thrombosis (DVT) left knee, 25 yrs ago  Dysphagia  S/P total knee replacement, left      CURRENT NUTRITION ORDER:   - Bolus 5 cans of Jevity 1.5 via PE cans (474 mL) @ 0800 and 1400 and 1 can (237 mL) @ 2000   - Flush with 50 mL water before and after each bolus (provides 300 mL)   - The above provides 1775 kcal, 76 g protein, 1200 mL free fluid   - Free water bolus 100 mL @ 1000, 1200, 1600, 1800 (provides 400 mL) or at team’s discretion   - NKFA     PO INTAKE:  % [  ]       50-75% [  ]       25-50% [  ]       <25% [  ]       N/A [ xx ]     GI: Denies any nausea/vomiting; PEG placement LUQ; last bowel movement documented     PAIN:  Denies pain or discomfort     SKIN: no edema documented; pressure injury sacral spine stage II - stable; pressure injury Rt buttocks      LABS:      139  |  100  |  28<H>  ----------------------------<  153<H>  4.3   |  32<H>  |  0.77    Ca    9.2      2024 05:30  Phos  2.5       Mg     2.1           CAPILLARY BLOOD GLUCOSE  POCT Blood Glucose.: 131 mg/dL (2024 11:31)  POCT Blood Glucose.: 161 mg/dL (2024 06:04)  POCT Blood Glucose.: 172 mg/dL (2024 23:57)  POCT Blood Glucose.: 209 mg/dL (2024 17:13)      MEDICATIONS  (STANDING):  acetylcysteine 20%  Inhalation 4 milliLiter(s) Inhalation two times a day  cefepime   IVPB      cefepime   IVPB 2000 milliGRAM(s) IV Intermittent every 8 hours  chlorhexidine 4% Liquid 1 Application(s) Topical <User Schedule>  dextrose 5%. 1000 milliLiter(s) (100 mL/Hr) IV Continuous <Continuous>  dextrose 5%. 1000 milliLiter(s) (50 mL/Hr) IV Continuous <Continuous>  dextrose 50% Injectable 12.5 Gram(s) IV Push once  dextrose 50% Injectable 25 Gram(s) IV Push once  dextrose 50% Injectable 25 Gram(s) IV Push once  doxazosin 1 milliGRAM(s) Oral at bedtime  enoxaparin Injectable 40 milliGRAM(s) SubCutaneous every 24 hours  glucagon  Injectable 1 milliGRAM(s) IntraMuscular once  influenza  Vaccine (HIGH DOSE) 0.7 milliLiter(s) IntraMuscular once  insulin lispro (ADMELOG) corrective regimen sliding scale   SubCutaneous every 6 hours  polyethylene glycol 3350 17 Gram(s) Oral every 24 hours  sodium chloride 0.9% for Nebulization 3 milliLiter(s) Nebulizer every 4 hours      MEDICATIONS  (PRN):  acetaminophen   Oral Liquid .. 650 milliGRAM(s) Enteral Tube every 6 hours PRN Mild Pain (1 - 3)  albuterol    90 MICROgram(s) HFA Inhaler 2 Puff(s) Inhalation every 6 hours PRN Shortness of Breath  dextrose Oral Gel 15 Gram(s) Oral once PRN Blood Glucose LESS THAN 70 milliGRAM(s)/deciliter  ondansetron Injectable 4 milliGRAM(s) IV Push every 6 hours PRN Nausea  oxyCODONE    IR 5 milliGRAM(s) Oral every 6 hours PRN Severe Pain (7 - 10)  oxyCODONE    IR 2.5 milliGRAM(s) Oral every 6 hours PRN Moderate Pain (4 - 6)      ANTHROPOMETRICS:   Height (inches):  69   Weight (pounds):  114.0 ()   BMI (kg/m^2):  16.8   IBW (pounds):  160 +/- 10%   %IBW:  71.3 %     WEIGHT CHANGE: no new weights     ESTIMATED NUTRIENT NEEDS:   Calories:  (30-35 kcal/kg) 9177-3649 kcal   Protein:  (1.4-1.7 g/kg) 72-88 g   Fluids:  1 mL/kcal or at team’s discretion   Ideal body weight (IBW) used to calculate energy needs due to pt's current body weight <80% ideal body weight per [facility] Standards of Nutrition Care. Needs adjusted for age and current clinical status.       SUBJECTIVE:   88M w PMH of R sided laryngeal mass (known to Dr. Crisostomo, biopsy R piriform sinus showed SCC in May 2023, now s/p RT, PEG-dependent) presenting from [facility] with worsening shortness of breath, increased secretion and voice hoarseness. CT scan in [facility] concerning for recurrence of cancer, FOE with large ulcerative lesion R arytenoid/FVF obscuring view of R TVF but likely immobile, L nodular arytenoid mass, L TVF mobile. Now s/p direct laryngoscopy w/ biopsy and trach placement on . Transferred to SICU postop.    Patient discussed with nursing and team. Chart, meds, and labs reviewed. Tmax 37.1 C. MAPs  mm Hg. Meds significant for insulin sliding scale (lispro), polyethylene glycol (MiraLAX), ondansetron (Zofran). Labs significant for POCT 103-341; glucose 153H. A&Ox4. Met with patient on 8 Lachman. Patient denies any nausea/vomiting or GI pain. Per nursing, patient tolerating tube feeds well.       PREVIOUS NUTRITION DIAGNOSIS: Severe protein calorie malnutrition in context of chronic illness related to inadequate intake as evidenced by <75% of nutrition needs >1 month, severe muscle/fat wasting, 19% weight loss x6 months    Active [ xx ]       Resolved [  ]     GOAL: Patient to meet at least 75% of nutritional needs consistently via most feasible route    RECOMMENDATIONS:   - Most POCT readings are in the 100s range, with two readings in the 200s and one reading in 300s  - No diagnosis of DM in EMR; no HgbA1c results - recommend check HgbA1c   - If patient with elevated HgbA1c and POCT levels trend >200 mg/dL, consider switching from Jevity 1.5 to Glucerna 1.5   - Recommend continue current tube feed order as patient tolerating well and meeting all macro- and micronutrient needs    >> Bolus 5 cans of Jevity 1.5 via PE cans (474 mL) @ 0800 and 1400 and 1 can (237 mL) @ 2000     >> Flush with 50 mL water before and after each bolus (provides 300 mL)     >> The above provides 1775 kcal, 76 g protein, 1200 mL free fluid (34.3 kcal/kg and 1.5 g/kg protein based on IBW 51.8 kg)    >> Free water bolus 100 mL @ 1000, 1200, 1600, 1800 (provides 400 mL) or at team’s discretion     >> Hold feeds if increase in pressor requirements, MAPs consistently trending <60 mmHg, lactic acidosis presents, or GI intolerance is noted   - Monitor tube feed tolerance, GI distress, labs, weights   - Monitor electrolytes, adjust and replete PRN   - Pain and bowel regimen as per team   - The above recommendations were communicated with the team    EDUCATION:  Pt educated on medical nutrition therapy specific to the patient’s diagnosis/condition; he expressed understanding; all questions and concerns addressed to his satisfaction     RISK LEVEL:  High [ xx ]       Moderate [  ]       Low [  ]     RAMAN Vásquez, MS, RD, CDN [id number]
Admitting Diagnosis:   Patient is a 88y old  Male who presents with a chief complaint of SOB  (2024 07:42)      PAST MEDICAL & SURGICAL HISTORY:  [place] (hard of hearing)  HTN (hypertension)  Glaucoma  Deep vein thrombosis (DVT) left knee, 25 yrs ago  Dysphagia  S/P total knee replacement, left      CURRENT NUTRITION ORDER:   - Bolus 5 cans of Jevity 1.5 via PE cans (474 mL) @ 0800 and 1400 and 1 can (237 mL) @ 2000   - Flush with 50 mL water before and after each bolus (provides 300 mL)   - The above provides 1775 kcal, 76 g protein, 1200 mL free fluid   - Free water bolus 100 mL @ 1000, 1200, 1600, 1800 (provides 400 mL) or at team’s discretion   - NKFA    PO INTAKE:  % [  ]       50-75% [  ]       25-50% [  ]       <25% [  ]       N/A [ xx ]     GI: abdomen nondistended, soft/nontender; last bowel movement documented ; PEG placement LUQ;    PAIN:  Denies pain or discomfort     SKIN: 1+ trace edema dependent; pressure injury sacrum stage II       LABS: no recent labs    CAPILLARY BLOOD GLUCOSE  POCT Blood Glucose.: 120 mg/dL (2024 11:35)  POCT Blood Glucose.: 108 mg/dL (2024 06:10)  POCT Blood Glucose.: 121 mg/dL (2024 23:53)  POCT Blood Glucose.: 119 mg/dL (2024 17:11)      MEDICATIONS  (STANDING):  acetylcysteine 20%  Inhalation 4 milliLiter(s) Inhalation two times a day  dextrose 5%. 1000 milliLiter(s) (50 mL/Hr) IV Continuous <Continuous>  dextrose 5%. 1000 milliLiter(s) (100 mL/Hr) IV Continuous <Continuous>  dextrose 50% Injectable 25 Gram(s) IV Push once  dextrose 50% Injectable 12.5 Gram(s) IV Push once  dextrose 50% Injectable 25 Gram(s) IV Push once  doxazosin 1 milliGRAM(s) Oral at bedtime  enoxaparin Injectable 40 milliGRAM(s) SubCutaneous every 24 hours  glucagon  Injectable 1 milliGRAM(s) IntraMuscular once  influenza  Vaccine (HIGH DOSE) 0.7 milliLiter(s) IntraMuscular once  insulin lispro (ADMELOG) corrective regimen sliding scale   SubCutaneous every 6 hours  polyethylene glycol 3350 17 Gram(s) Oral daily  sodium chloride 0.9% for Nebulization 3 milliLiter(s) Nebulizer every 4 hours      MEDICATIONS  (PRN):  acetaminophen   Oral Liquid .. 650 milliGRAM(s) Enteral Tube every 6 hours PRN Mild Pain (1 - 3)  albuterol    90 MICROgram(s) HFA Inhaler 2 Puff(s) Inhalation every 6 hours PRN Shortness of Breath  dextrose Oral Gel 15 Gram(s) Oral once PRN Blood Glucose LESS THAN 70 milliGRAM(s)/deciliter  ondansetron Injectable 4 milliGRAM(s) IV Push every 6 hours PRN Nausea      ANTHROPOMETRICS:   Height (inches):  69   Weight (pounds):  114.0 ()   BMI (kg/m^2):  16.8   IBW (pounds):  160 +/- 10%   %IBW:  71.3 %     WEIGHT CHANGE: no new weights     ESTIMATED NUTRIENT NEEDS:   Calories:  (30-35 kcal/kg) 5148-2381 kcal   Protein:  (1.4-1.7 g/kg) 72-88 g   Fluids:  1 mL/kcal or at team’s discretion   Ideal body weight (IBW) used to calculate energy needs due to pt's current body weight <80% ideal body weight per [facility] Standards of Nutrition Care. Needs adjusted for age and current clinical status.       SUBJECTIVE:   88M with history of HTN, COPD, glaucoma, hard of hearing, osteoarthritis, known R sided laryngeal mass (known to Dr. 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 Now s/p direct laryngoscopy w/ biopsy and trach placement on . Transferred to SICU postop.    Patient discussed with nursing and team. Chart, meds, and labs reviewed. Tmax 37.1 C. MAPs 83-95 mm Hg. Meds significant for insulin sliding scale (lispro), polyethylene glycol (MiraLAX), ondansetron (Zofran). Labs significant for POCT 103-270H; HgbA1c 6.4H. A&Ox4. Met with patient on 8 Lachman. His son-in-law was present throughout the nutrition interview. Patient denies nausea/vomiting or GI pain. Reports the current tube feed schedule is acceptable. Denies any signs/symptoms of intolerance.       PREVIOUS NUTRITION DIAGNOSIS: Severe protein calorie malnutrition in context of chronic illness related to inadequate intake as evidenced by <75% of nutrition needs >1 month, severe muscle/fat wasting, 19% weight loss x6 months    Active [ xx ]       Resolved [  ]     GOAL: Patient to meet at least 75% of nutritional needs consistently via most feasible route      RECOMMENDATIONS:   - Most recent POCT readings are in the 100s range, one recent reading in the 200s  - HgbA1c results in the pre-diabetic range  - If POCT levels begin to trend >200 mg/dL, consider switching from Jevity 1.5 to Glucerna 1.5   - Recommend continue current tube feed order as patient tolerating well and meeting all macro- and micronutrient needs with consideration for severe malnutrition     >> Bolus 5 cans of Jevity 1.5 via PE cans (474 mL) @ 0800 and 1400 and 1 can (237 mL) @ 2000     >> Flush with 50 mL water before and after each bolus (provides 300 mL)     >> The above provides 1775 kcal, 76 g protein, 1200 mL free fluid (34.3 kcal/kg and 1.5 g/kg protein based on IBW 51.8 kg)    >> Free water bolus 100 mL @ 1000, 1200, 1600, 1800 (provides 400 mL) or at team’s discretion     >> Hold feeds if increase in pressor requirements, MAPs consistently trending <60 mmHg, lactic acidosis presents, or GI intolerance is noted   - Monitor tube feed tolerance, GI distress, labs, weights   - Monitor electrolytes, adjust and replete PRN   - Pain and bowel regimen as per team   - The above recommendations were communicated with the team     EDUCATION:  deferred     RISK LEVEL:  High [  ]       Moderate [ xx ]       Low [  ]     RAMAN Vásquez, MS, RD, CDN [id number]

## 2024-01-31 NOTE — PROGRESS NOTE ADULT - ASSESSMENT
KUSUM GROVE is a 88M with history of known R sided laryngeal mass (known to Dr. 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 FOE: large ulcerative lesion R arytenoid/FVF obscuring view of R TVF but likely immobile, L nodular arytenoid mass, L TVF mobile. s/p tracheostomy. After GOC discussion 1/19 patient and family will not pursue any surgical treatment at this time. Plan to return home with trach supplies once delivered.     Plan:  - encourage ambulation 4-5x a day   - Family and son trach teaching ongoing  - Tube feeds - bolus feeds   - Has 7.5 Portex uncuffed tracheostomy tube in place   - Use soft suction/red rubber catheter for suctioning to avoid suction trauma   - Continue TC  - Patient and family do not desire any additional surgical treatment   - Patient with trach supplies bedside, plan to dc to [facility] today

## 2024-01-31 NOTE — PROGRESS NOTE ADULT - SUBJECTIVE AND OBJECTIVE BOX
OTOLARYNGOLOGY (ENT) PROGRESS NOTE    PATIENT: KUSUM GROVE  MRN: [id number]  : 35  [id number]  DATE OF SERVICE:  24  			         ID:KUSUM GROVE is an 88M with history of known R sided laryngeal mass (known to Dr. Crisostomo, biopsy R piriform sinus 2023 pos. for SCC, now s/p RT, PEG-dependent) presenting with worsening shortness of breath for past month and new imaging findings on CT scan from one week ago in [facility], [place] concerning for new laryngeal mass. Patient lives and receives care in [facility], [place]. He reports increased cough and mucous production associated with intermittent episodes of worsening shortness of breath for the past month. This has prompted ED visits for evaluation and discharge with steroid courses, which temporarily improved his breathing difficulty, however, patient also has glaucoma and has been advised by his ophthalmologist to avoid futures courses of steroids. He has had a hoarse, breathy voice for the past six months. Son in law reports significant weight loss over past few months. He is PEG-dependent and does not have any oral intake. Non-smoker, social alcohol use.      Subjective/ Interval:   : AFVSS on RA ON. Patient seen and evaluated this am. Pt with inspiratory stridor and increased secretions. Pt taken for a trach, DL with biopsy. Pt tolerated surgery well and was brought to SICU.  : AFVSS on TC. Patient seen and evaluated this am. Trach cuff deflated. Trach site dry. Mucous suctioned from trach.  : AFVSS on TC. Patient seen and evaluated this am doing well. Cuff remains deflated. Trach site dry. Some mucous suctioned from trach. Pt seen spitting up white-tinged mucous.  1/15: AFVSS on TC. Patient seen and evaluated this am doing well. Cuff remains deflated. Trach site dry. Some secretions suctioned from the trach site.  : patient seen this morning, trach sutured in place, continues to suction thick secretions, will start nebulized saline today   : Patient seen this morning, trach in place with thick secretions, continue IV abx,  continue nebulized saline meds, continue trach teaching with family   : Patient seen this morning,  trach in place.  continue teaching pending conversation with tumor board Monday, pain controlled, ambulating well   ; patient seen this morning, trach in place will remove sutures today, Dr. hudson ND Dr. Crisostomo will had goals of care discussion today with patient   : patient seen on morning rounds. Cuffless trach in place, secured by trach collar. Still with thick secretions requiring frequent suctioning. reports able to ambulate yesterday. Had GOC discussion and will not pursue surgical management.   : patient seen on morning rounds. Cuffless trach in place, secured by trach collar. Secretions noted with small streaks of blood overnight. Ambulating, moving bowels, voiding freely. No new issues.   : AFVSS. Patient seen and evaluated on am rounds. Cuffless trach in place and secure. Secretions noted, pt suctioned. Pt ambulating.  : patient seen this morning, no overnight events, patient was suctioned, secretions bit thinner, patient continues to ambulate   : Patient seen this morning ans suctioned moderate amount of secretions.  allevyn foam changed under trach, pending auth for insurance to deliver supplies   : Pending supplies delivery bedside, trach care performed,   : Pending supplies delivery, trach care performed, moderate amount of secretions   : pending trach supplies. trach care performed. thin secretions, able to clear. satting well on trach collar.  : pending trach supplies. trach care performed. thin secretions.  : pending trach supplies, trach care performed. Pt doing well.  : Patient seen bedside trach care performed, doing well, pending supplies   : Pt doing well. 18 beats of vtach ON, pt remained asymptomatic and returned to sinus rhythm spontaneously. Patient seen bedside. Trach care performed.     ALLERGIES:  penicillin (Rash)      MEDICATIONS:  Antiinfectives:     IV fluids:  dextrose 5%. 1000 milliLiter(s) IV Continuous <Continuous>  dextrose 5%. 1000 milliLiter(s) IV Continuous <Continuous>    Hematologic/Anticoagulation:  enoxaparin Injectable 40 milliGRAM(s) SubCutaneous every 24 hours    Pain medications/Neuro:  acetaminophen   Oral Liquid .. 650 milliGRAM(s) Enteral Tube every 6 hours PRN  ondansetron Injectable 4 milliGRAM(s) IV Push every 6 hours PRN    Endocrine Medications:   dextrose 50% Injectable 12.5 Gram(s) IV Push once  dextrose 50% Injectable 25 Gram(s) IV Push once  dextrose 50% Injectable 25 Gram(s) IV Push once  dextrose Oral Gel 15 Gram(s) Oral once PRN  glucagon  Injectable 1 milliGRAM(s) IntraMuscular once  insulin lispro (ADMELOG) corrective regimen sliding scale   SubCutaneous every 6 hours    All other standing medications:   acetylcysteine 20%  Inhalation 4 milliLiter(s) Inhalation two times a day  doxazosin 1 milliGRAM(s) Oral at bedtime  influenza  Vaccine (HIGH DOSE) 0.7 milliLiter(s) IntraMuscular once  polyethylene glycol 3350 17 Gram(s) Oral daily  sodium chloride 0.9% for Nebulization 3 milliLiter(s) Nebulizer every 4 hours    All other PRN medications:  albuterol    90 MICROgram(s) HFA Inhaler 2 Puff(s) Inhalation every 6 hours PRN    Vital Signs Last 24 Hrs  T(C): 36.8 (2024 05:01), Max: 36.9 (2024 17:48)  T(F): 98.3 (2024 05:01), Max: 98.5 (2024 17:48)  HR: 68 (2024 04:27) (68 - 82)  BP: 116/61 (2024 04:27) (116/61 - 133/70)  BP(mean): 83 (2024 04:27) (82 - 94)  RR: 18 (2024 04:27) (16 - 18)  SpO2: 97% (2024 04:27) (97% - 99%)    Parameters below as of 2024 04:27  Patient On (Oxygen Delivery Method): tracheostomy collar  O2 Flow (L/min): 8  O2 Concentration (%): 35      -30 @ 07:01  -  01-31 @ 07:00  --------------------------------------------------------  IN:    Enteral Tube Flush: 630 mL    Jevity 1.2: 1115 mL  Total IN: 1745 mL    OUT:    Voided (mL): 0 mL  Total OUT: 0 mL    Total NET: 1745 mL      PHYSICAL EXAM:  GEN: appears stated age, NAD  NEURO: alert & oriented x /  HEENT: face symmetric, trach in place, secretions suctioned  CVS: regular rate and rhythm  Pulm: normal respiratory excursions, not tachypneic, no labored breathing  Abd: non-distended  Ext: moving all four extremities, no peripheral edema noted

## 2024-01-31 NOTE — PROGRESS NOTE ADULT - NUTRITIONAL ASSESSMENT
This patient has been assessed with a concern for Malnutrition and has been determined to have a diagnosis/diagnoses of Severe protein-calorie malnutrition and Underweight (BMI < 19).    This patient is being managed with:   Diet NPO with Tube Feed-  Tube Feeding Modality: Gastrostomy  Jevity 1.2 Phil (JEVITY1.2RTH)  Total Volume for 24 Hours (mL): 1200  Total Number of Cans: 5  Bolus  Total Volume of Bolus (mL):  300  Tube Feed Frequency: Every 6 hours   Tube Feed Start Time: 19:00  Bolus Feed Rate (mL per Hour): 300   Bolus Feed Duration (in Hours): 0  Free Water Flush  Bolus   Total Volume per Flush (mL): 50   Frequency: Every 6 Hours  Entered: Jan 12 2024  4:49PM  
This patient has been assessed with a concern for Malnutrition and has been determined to have a diagnosis/diagnoses of Severe protein-calorie malnutrition and Underweight (BMI < 19).    This patient is being managed with:   Diet NPO with Tube Feed-  Tube Feeding Modality: Gastrostomy  Jevity 1.5 Phil (JEVITY1.5)  Total Volume for 24 Hours (mL): 1896  Total Number of Cans: 8  Bolus  Total Volume of Bolus (mL):  474  Total # of Feeds: 3  Tube Feed Frequency: Every 6 hours   Tube Feed Start Time: 08:00  Bolus Feed Rate (mL per Hour): 474   Bolus Feed Duration (in Hours): 1  Bolus Feed Instructions:  BOLUS 2 CANS AT 0800 AND 1400; BOLUS 1 CAN AT 2000  Free Water Flush  Bolus   Total Volume per Flush (mL): 100   Frequency: Every 2 Hours   Total Daily Volume of Flush (mL): 700    Start Time: 10:00  Free Water Flush Instructions:  FLUSH 50mL BEFORE AND AFTER EACH TUBE BOLUS; BOLUS 100mL AT 1000 1200 1600 AND 1800  Entered: Jan 16 2024  6:40PM  
This patient has been assessed with a concern for Malnutrition and has been determined to have a diagnosis/diagnoses of Severe protein-calorie malnutrition and Underweight (BMI < 19).    This patient is being managed with:   Diet NPO with Tube Feed-  Tube Feeding Modality: Gastrostomy  Jevity 1.2 Phil (JEVITY1.2RTH)  Total Volume for 24 Hours (mL): 1200  Total Number of Cans: 5  Bolus  Total Volume of Bolus (mL):  300  Tube Feed Frequency: Every 6 hours   Tube Feed Start Time: 19:00  Bolus Feed Rate (mL per Hour): 300   Bolus Feed Duration (in Hours): 0  Free Water Flush  Bolus   Total Volume per Flush (mL): 50   Frequency: Every 6 Hours  Entered: Jan 12 2024  4:49PM  
This patient has been assessed with a concern for Malnutrition and has been determined to have a diagnosis/diagnoses of Severe protein-calorie malnutrition and Underweight (BMI < 19).    This patient is being managed with:   Diet NPO with Tube Feed-  Tube Feeding Modality: Gastrostomy  Jevity 1.2 Phil (JEVITY1.2RTH)  Total Volume for 24 Hours (mL): 1200  Total Number of Cans: 5  Bolus  Total Volume of Bolus (mL):  300  Tube Feed Frequency: Every 6 hours   Tube Feed Start Time: 19:00  Bolus Feed Rate (mL per Hour): 300   Bolus Feed Duration (in Hours): 0  Free Water Flush  Bolus   Total Volume per Flush (mL): 50   Frequency: Every 6 Hours  Entered: Jan 12 2024  4:49PM

## 2024-02-01 VITALS
DIASTOLIC BLOOD PRESSURE: 61 MMHG | SYSTOLIC BLOOD PRESSURE: 129 MMHG | OXYGEN SATURATION: 99 % | RESPIRATION RATE: 18 BRPM | HEART RATE: 76 BPM

## 2024-02-01 LAB — GLUCOSE BLDC GLUCOMTR-MCNC: 134 MG/DL — HIGH (ref 70–99)

## 2024-02-01 PROCEDURE — 80048 BASIC METABOLIC PNL TOTAL CA: CPT

## 2024-02-01 PROCEDURE — 94640 AIRWAY INHALATION TREATMENT: CPT

## 2024-02-01 PROCEDURE — 86901 BLOOD TYPING SEROLOGIC RH(D): CPT

## 2024-02-01 PROCEDURE — 85027 COMPLETE CBC AUTOMATED: CPT

## 2024-02-01 PROCEDURE — 83735 ASSAY OF MAGNESIUM: CPT

## 2024-02-01 PROCEDURE — 88305 TISSUE EXAM BY PATHOLOGIST: CPT

## 2024-02-01 PROCEDURE — 71260 CT THORAX DX C+: CPT

## 2024-02-01 PROCEDURE — 87186 SC STD MICRODIL/AGAR DIL: CPT

## 2024-02-01 PROCEDURE — L8699: CPT

## 2024-02-01 PROCEDURE — 70491 CT SOFT TISSUE NECK W/DYE: CPT

## 2024-02-01 PROCEDURE — 87184 SC STD DISK METHOD PER PLATE: CPT

## 2024-02-01 PROCEDURE — 86850 RBC ANTIBODY SCREEN: CPT

## 2024-02-01 PROCEDURE — 84100 ASSAY OF PHOSPHORUS: CPT

## 2024-02-01 PROCEDURE — 82962 GLUCOSE BLOOD TEST: CPT

## 2024-02-01 PROCEDURE — 87070 CULTURE OTHR SPECIMN AEROBIC: CPT

## 2024-02-01 PROCEDURE — 93005 ELECTROCARDIOGRAM TRACING: CPT

## 2024-02-01 PROCEDURE — 71045 X-RAY EXAM CHEST 1 VIEW: CPT

## 2024-02-01 PROCEDURE — 86900 BLOOD TYPING SEROLOGIC ABO: CPT

## 2024-02-01 PROCEDURE — 36415 COLL VENOUS BLD VENIPUNCTURE: CPT

## 2024-02-01 PROCEDURE — 83036 HEMOGLOBIN GLYCOSYLATED A1C: CPT

## 2024-02-01 RX ADMIN — SODIUM CHLORIDE 3 MILLILITER(S): 9 INJECTION INTRAMUSCULAR; INTRAVENOUS; SUBCUTANEOUS at 03:19

## 2024-02-01 NOTE — DISCHARGE NOTE NURSING/CASE MANAGEMENT/SOCIAL WORK - PATIENT PORTAL LINK FT
You can access the FollowMyHealth Patient Portal offered by [facility] by registering at the following website: [email or web address]. By joining [employer]’s FollowMyHealth portal, you will also be able to view your health information using other applications (apps) compatible with our system.

## 2024-02-01 NOTE — DISCHARGE NOTE NURSING/CASE MANAGEMENT/SOCIAL WORK - CAREGIVER ADDRESS
N/A details… normal/sensation intact/responds to pain/responds to verbal commands/cranial nerves intact

## 2024-02-01 NOTE — DISCHARGE NOTE NURSING/CASE MANAGEMENT/SOCIAL WORK - NSDCPEFALRISK_GEN_ALL_CORE
For information on Fall & Injury Prevention, visit: [email or web address] OR  [email or web address] OR  https://www.cdc.gov/steadi/patient.html

## 2024-02-06 DIAGNOSIS — E43 UNSPECIFIED SEVERE PROTEIN-CALORIE MALNUTRITION: ICD-10-CM

## 2024-02-06 DIAGNOSIS — I10 ESSENTIAL (PRIMARY) HYPERTENSION: ICD-10-CM

## 2024-02-06 DIAGNOSIS — C32.9 MALIGNANT NEOPLASM OF LARYNX, UNSPECIFIED: ICD-10-CM

## 2024-02-06 DIAGNOSIS — H91.90 UNSPECIFIED HEARING LOSS, UNSPECIFIED EAR: ICD-10-CM

## 2024-02-06 DIAGNOSIS — J44.9 CHRONIC OBSTRUCTIVE PULMONARY DISEASE, UNSPECIFIED: ICD-10-CM

## 2024-02-06 DIAGNOSIS — R49.0 DYSPHONIA: ICD-10-CM

## 2024-02-06 DIAGNOSIS — Z93.1 GASTROSTOMY STATUS: ICD-10-CM

## 2024-02-06 DIAGNOSIS — J38.7 OTHER DISEASES OF LARYNX: ICD-10-CM

## 2024-02-06 DIAGNOSIS — C12 MALIGNANT NEOPLASM OF PYRIFORM SINUS: ICD-10-CM

## 2024-02-06 DIAGNOSIS — Z96.652 PRESENCE OF LEFT ARTIFICIAL KNEE JOINT: ICD-10-CM

## 2024-02-06 DIAGNOSIS — M15.9 POLYOSTEOARTHRITIS, UNSPECIFIED: ICD-10-CM

## 2024-02-06 DIAGNOSIS — Z86.718 PERSONAL HISTORY OF OTHER VENOUS THROMBOSIS AND EMBOLISM: ICD-10-CM

## 2024-02-06 DIAGNOSIS — Z88.0 ALLERGY STATUS TO PENICILLIN: ICD-10-CM

## 2024-02-06 DIAGNOSIS — H40.9 UNSPECIFIED GLAUCOMA: ICD-10-CM

## (undated) DEVICE — WARMING BLANKET LOWER ADULT

## (undated) DEVICE — SUT SILK 2-0 30" PSL

## (undated) DEVICE — MARKING PEN W RULER

## (undated) DEVICE — SUCTION CATH AIRLIFE CONTROL VALVE TRIFLO 14FR

## (undated) DEVICE — Device

## (undated) DEVICE — PACK UPPER BODY

## (undated) DEVICE — SOL ANTI FOG

## (undated) DEVICE — SUT SILK 2-0 30" SH

## (undated) DEVICE — GLV 6.5 DERMAPRENE ULTRA

## (undated) DEVICE — DRSG TELFA 3 X 8

## (undated) DEVICE — DRAPE LIGHT HANDLE COVER (GREEN)

## (undated) DEVICE — SET IV INFUSE NDL 23GAX.75 12" TUBING

## (undated) DEVICE — SUT VICRYL 3-0 18" SH (POP-OFF)

## (undated) DEVICE — POLISHER OR CAUTERY TIP

## (undated) DEVICE — DRAPE SPLIT SHEET 77" X 108"

## (undated) DEVICE — DRAPE SURGICAL #1010

## (undated) DEVICE — WARMING BLANKET UPPER ADULT

## (undated) DEVICE — TUBING SUCTION 20FT

## (undated) DEVICE — VENODYNE/SCD SLEEVE CALF MEDIUM

## (undated) DEVICE — SUT SILK 3-0 18" TIES

## (undated) DEVICE — GLV 7 PROTEXIS (WHITE)